# Patient Record
Sex: FEMALE | Race: WHITE | NOT HISPANIC OR LATINO | Employment: OTHER | URBAN - METROPOLITAN AREA
[De-identification: names, ages, dates, MRNs, and addresses within clinical notes are randomized per-mention and may not be internally consistent; named-entity substitution may affect disease eponyms.]

---

## 2017-02-07 ENCOUNTER — OFFICE VISIT (OUTPATIENT)
Dept: FAMILY MEDICINE | Facility: CLINIC | Age: 72
End: 2017-02-07
Payer: MEDICARE

## 2017-02-07 VITALS
BODY MASS INDEX: 44.02 KG/M2 | WEIGHT: 233.13 LBS | OXYGEN SATURATION: 96 % | HEIGHT: 61 IN | DIASTOLIC BLOOD PRESSURE: 68 MMHG | HEART RATE: 80 BPM | TEMPERATURE: 98 F | SYSTOLIC BLOOD PRESSURE: 130 MMHG

## 2017-02-07 DIAGNOSIS — E66.01 MORBID OBESITY WITH BMI OF 40.0-44.9, ADULT: ICD-10-CM

## 2017-02-07 DIAGNOSIS — E78.5 HYPERLIPIDEMIA, UNSPECIFIED HYPERLIPIDEMIA TYPE: ICD-10-CM

## 2017-02-07 DIAGNOSIS — C50.919 BREAST CANCER, STAGE 1, UNSPECIFIED LATERALITY: ICD-10-CM

## 2017-02-07 DIAGNOSIS — J32.9 SINUSITIS, UNSPECIFIED CHRONICITY, UNSPECIFIED LOCATION: Primary | ICD-10-CM

## 2017-02-07 DIAGNOSIS — I70.0 ATHEROSCLEROSIS OF AORTA: ICD-10-CM

## 2017-02-07 DIAGNOSIS — I12.9 BENIGN HYPERTENSION WITH CHRONIC KIDNEY DISEASE, STAGE III: ICD-10-CM

## 2017-02-07 DIAGNOSIS — Z79.811 USE OF AROMATASE INHIBITORS: ICD-10-CM

## 2017-02-07 DIAGNOSIS — R73.03 PREDIABETES: ICD-10-CM

## 2017-02-07 DIAGNOSIS — N18.30 BENIGN HYPERTENSION WITH CHRONIC KIDNEY DISEASE, STAGE III: ICD-10-CM

## 2017-02-07 DIAGNOSIS — N18.30 CKD (CHRONIC KIDNEY DISEASE) STAGE 3, GFR 30-59 ML/MIN: ICD-10-CM

## 2017-02-07 PROCEDURE — 99214 OFFICE O/P EST MOD 30 MIN: CPT | Mod: PBBFAC,PO | Performed by: NURSE PRACTITIONER

## 2017-02-07 PROCEDURE — 99214 OFFICE O/P EST MOD 30 MIN: CPT | Mod: S$PBB,,, | Performed by: NURSE PRACTITIONER

## 2017-02-07 PROCEDURE — 99999 PR PBB SHADOW E&M-EST. PATIENT-LVL IV: CPT | Mod: PBBFAC,,, | Performed by: NURSE PRACTITIONER

## 2017-02-07 RX ORDER — AZITHROMYCIN 250 MG/1
TABLET, FILM COATED ORAL
Qty: 6 TABLET | Refills: 0 | Status: SHIPPED | OUTPATIENT
Start: 2017-02-07 | End: 2017-03-08

## 2017-02-07 NOTE — MR AVS SNAPSHOT
[facility]  [address]  [place] [address]  Phone: [phone]  Fax: [phone]                  Francoise De Jesus   2017 11:20 AM   Office Visit    Description:  Female : 1945   Provider:  YODIT Polk   Department:  [facility]           Reason for Visit     Otalgia           Diagnoses this Visit        Comments    Sinusitis, unspecified chronicity, unspecified location    -  Primary     Atherosclerosis of aorta         Benign hypertension with chronic kidney disease, stage III         Use of aromatase inhibitors         Breast cancer, stage 1, unspecified laterality         CKD (chronic kidney disease) stage 3, GFR 30-59 ml/min         Hyperlipidemia, unspecified hyperlipidemia type         Morbid obesity with BMI of 40.0-44.9, adult         Prediabetes                To Do List           Future Appointments        Provider Department Dept Phone    3/13/2017 10:20 AM Lulu Auguste MD [facility] [phone]      Goals (5 Years of Data)     None       These Medications        Disp Refills Start End    azithromycin (ZITHROMAX Z-NATHANIEL) 250 MG tablet 6 tablet 0 2017     Take 2 pills day 1, then 1 pill day 2-5.    Pharmacy: [facility]  [place], [address] Ph #: [phone]         [facility] On Call     [facility] On Call Nurse Care Line -  Assistance  Registered nurses in the [facility] On Call Center provide clinical advisement, health education, appointment booking, and other advisory services.  Call for this free service at [phone].             Medications           Message regarding Medications     Verify the changes and/or additions to your medication regime listed below are the same as discussed with your clinician today.  If any of these changes or additions are incorrect, please notify your healthcare provider.        START taking these NEW medications        Refills     "azithromycin (ZITHROMAX Z-NATHANIEL) 250 MG tablet 0    Sig: Take 2 pills day 1, then 1 pill day 2-5.    Class: Normal           Verify that the below list of medications is an accurate representation of the medications you are currently taking.  If none reported, the list may be blank. If incorrect, please contact your healthcare provider. Carry this list with you in case of emergency.           Current Medications     acetaminophen (TYLENOL) 500 MG tablet Take 500 mg by mouth every 6 (six) hours as needed for Pain.    anastrozole (ARIMIDEX) 1 mg Tab Take 1 tablet (1 mg total) by mouth once daily.    ANTIOX#10/OM3/DHA/EPA/LUT/ZEAX (I-CAPS ORAL) Take 1 tablet by mouth once daily.    fenofibrate (TRICOR) 145 MG tablet Take 1 tablet (145 mg total) by mouth once daily. 1 Tablet Oral Every evening    fish oil-omega-3 fatty acids (FISH OIL) 300-1,000 mg capsule     hydrochlorothiazide (HYDRODIURIL) 25 MG tablet Take 1 tablet (25 mg total) by mouth once daily. 1 Tablet Oral Every morning    lisinopril (PRINIVIL,ZESTRIL) 40 MG tablet Take 1 tablet (40 mg total) by mouth once daily. 1 Tablet Oral Every morning    omeprazole (PRILOSEC) 40 MG capsule Take 1 capsule (40 mg total) by mouth once daily.    pravastatin (PRAVACHOL) 10 MG tablet Take 1 tablet (10 mg total) by mouth once daily.    azithromycin (ZITHROMAX Z-NATHANIEL) 250 MG tablet Take 2 pills day 1, then 1 pill day 2-5.           Clinical Reference Information           Your Vitals Were     BP Pulse Temp Height Weight SpO2    130/68 (BP Location: Left arm, Patient Position: Sitting, BP Method: Manual) 80 98.3 °F (36.8 °C) (Oral) 5' 1" (1.549 m) 105.7 kg (233 lb 2.2 oz) 96%    BMI                44.05 kg/m2          Blood Pressure          Most Recent Value    BP  130/68      Allergies as of 2/7/2017     Medrol [Methylprednisolone]    Penicillins      Immunizations Administered on Date of Encounter - 2/7/2017     Name Date Dose VIS Date Route    TDAP  Incomplete 0.5 mL 2/24/2015 " Intramuscular      Orders Placed During Today's Visit      Normal Orders This Visit    Tdap Vaccine (Adult)       Instructions    Mucinex DM as directed        Language Assistance Services     ATTENTION: Language assistance services are available, free of charge. Please call [phone].      ATENCIÓN: Si habla yolanda, tiene a montemayor disposición servicios gratuitos de asistencia lingüística. Llame al [phone].     MADELEINE Ý: N?u b?n nói Ti?ng Vi?t, có các d?ch v? h? tr? ngôn ng? mi?n phí dành cho b?n. G?i s? [phone].         [facility] complies with applicable Federal civil rights laws and does not discriminate on the basis of race, color, national origin, age, disability, or sex.

## 2017-02-07 NOTE — PROGRESS NOTES
Subjective:       Patient ID: Francoise De Jesus is a 71 y.o. female.    Chief Complaint: Otalgia (Left 3 days)    HPI Comments: 71-year-old female presents to the clinic today with left ear pain, sore throat and sinus congestion since Sunday.   she denies any fever, chills, coughing, wheezing, shortness of breath, abdominal pain, nausea, vomiting, or diarrhea.  She denies any headaches, dizziness, or blurred vision.  She denies any cardiac chest pain, heart palpitations, shortness of breath, or swelling to lower extremities.  She's not taking anything yet for her symptoms.     Past Medical History   Diagnosis Date    Arthritis     Atherosclerosis of aorta      noted on CXR 11/17/2014    Breast cancer 2011     stage I right breast cancer    History of peptic ulcer     Hyperlipidemia     Hypertension     Morbid obesity     Prediabetes      Past Surgical History   Procedure Laterality Date    Cholecystectomy      Breast lumpectomy Right 11/2011     right lumpectomy and SN biopsy      reports that she has never smoked. She has never used smokeless tobacco. She reports that she does not drink alcohol or use illicit drugs.  Review of Systems   Constitutional: Negative for chills and fever.   HENT: Positive for congestion, ear pain and sore throat. Negative for ear discharge, sinus pressure, sneezing and tinnitus.    Eyes: Negative for pain, discharge and itching.   Respiratory: Negative for cough, shortness of breath and wheezing.    Cardiovascular: Negative for chest pain, palpitations and leg swelling.   Gastrointestinal: Negative for abdominal pain, diarrhea, nausea and vomiting.   Musculoskeletal: Negative for gait problem, neck pain and neck stiffness.   Skin: Negative for rash.   Neurological: Negative for dizziness, light-headedness and headaches.       Objective:      Physical Exam   Constitutional: She is oriented to person, place, and time. She appears well-developed and well-nourished. No distress.   HENT:    Head: Normocephalic and atraumatic.   Right Ear: External ear normal.   Left Ear: External ear normal.   Mouth/Throat: Oropharynx is clear and moist. No oropharyngeal exudate.   Both nares red and inflamed with tenderness noted over both maxillary sinuses    Eyes: Conjunctivae and EOM are normal. Pupils are equal, round, and reactive to light. Right eye exhibits no discharge. Left eye exhibits no discharge. No scleral icterus.   Neck: Normal range of motion. Neck supple.   Cardiovascular: Normal rate, regular rhythm and normal heart sounds.  Exam reveals no gallop and no friction rub.    No murmur heard.  Pulmonary/Chest: Effort normal and breath sounds normal. No respiratory distress. She has no wheezes. She has no rales.   Abdominal: Soft. Bowel sounds are normal. There is no tenderness. There is no rebound and no guarding.   Musculoskeletal: Normal range of motion. She exhibits no edema.   Lymphadenopathy:     She has cervical adenopathy.   Neurological: She is alert and oriented to person, place, and time.   Skin: Skin is warm and dry. She is not diaphoretic.   Psychiatric: She has a normal mood and affect.       Assessment:       1. Sinusitis, unspecified chronicity, unspecified location    2. Atherosclerosis of aorta    3. Benign hypertension with chronic kidney disease, stage III    4. Use of aromatase inhibitors    5. Breast cancer, stage 1, unspecified laterality    6. CKD (chronic kidney disease) stage 3, GFR 30-59 ml/min    7. Hyperlipidemia, unspecified hyperlipidemia type    8. Morbid obesity with BMI of 40.0-44.9, adult    9. Prediabetes        Plan:         Sinusitis, unspecified chronicity, unspecified location  -     azithromycin (ZITHROMAX Z-NATHANIEL) 250 MG tablet; Take 2 pills day 1, then 1 pill day 2-5.  Dispense: 6 tablet; Refill: 0  - Mucinex DM as directed    Atherosclerosis of aorta  - Stable / Asymptomatic is on blood pressure and cholesterol lowering medications    Benign hypertension with  chronic kidney disease, stage III  - The current medical regimen is effective;  continue present plan and medications.  - avoid anti-inflammatories     Use of aromatase inhibitors  - The current medical regimen is effective;  continue present plan and medications.    Breast cancer, stage 1, unspecified laterality  - on Arimidex    CKD (chronic kidney disease) stage 3, GFR 30-59 ml/min  - avoid anti-inflammatories     Hyperlipidemia, unspecified hyperlipidemia type  - The current medical regimen is effective;  continue present plan and medications.    Morbid obesity with BMI of 40.0-44.9, adult  - The patient is asked to make an attempt to improve diet and exercise patterns to aid in medical management of this problem.    Prediabetes  - avoid excessive sugars and carbohydrates

## 2017-02-17 ENCOUNTER — TELEPHONE (OUTPATIENT)
Dept: HEMATOLOGY/ONCOLOGY | Facility: CLINIC | Age: 72
End: 2017-02-17

## 2017-02-17 NOTE — TELEPHONE ENCOUNTER
----- Message from Kenrick Brunner sent at 2/17/2017 10:34 AM CST -----  Contact: Pt      ----- Message -----     From: Prudence Herring     Sent: 2/16/2017   3:20 PM       To: Vick Chávez Staff    Pt called asking for advice about scheduling an appointment for a f/u.        Please call pt at [phone].        Thanks!

## 2017-03-08 ENCOUNTER — OFFICE VISIT (OUTPATIENT)
Dept: OPTOMETRY | Facility: CLINIC | Age: 72
End: 2017-03-08
Payer: MEDICARE

## 2017-03-08 DIAGNOSIS — H35.30 AMD (AGE RELATED MACULAR DEGENERATION): ICD-10-CM

## 2017-03-08 DIAGNOSIS — H52.4 HYPEROPIA WITH PRESBYOPIA OF BOTH EYES: ICD-10-CM

## 2017-03-08 DIAGNOSIS — Z13.5 GLAUCOMA SCREENING: ICD-10-CM

## 2017-03-08 DIAGNOSIS — H52.03 HYPEROPIA WITH PRESBYOPIA OF BOTH EYES: ICD-10-CM

## 2017-03-08 DIAGNOSIS — H25.13 NUCLEAR SCLEROSIS, BILATERAL: Primary | ICD-10-CM

## 2017-03-08 PROCEDURE — 92015 DETERMINE REFRACTIVE STATE: CPT | Mod: ,,, | Performed by: OPTOMETRIST

## 2017-03-08 PROCEDURE — 99212 OFFICE O/P EST SF 10 MIN: CPT | Mod: PBBFAC,PO | Performed by: OPTOMETRIST

## 2017-03-08 PROCEDURE — 99999 PR PBB SHADOW E&M-EST. PATIENT-LVL II: CPT | Mod: PBBFAC,,, | Performed by: OPTOMETRIST

## 2017-03-08 PROCEDURE — 92014 COMPRE OPH EXAM EST PT 1/>: CPT | Mod: S$PBB,,, | Performed by: OPTOMETRIST

## 2017-03-08 NOTE — PATIENT INSTRUCTIONS
Cataract Surgery FAQs  Frequently Asked Questions    My doctor told me I have a cataract     What do I do next?   Relax. Cataracts are a normal part of aging, and cataract surgery is among the safest and most common procedures performed today.  Most patients who have had cataract surgery report significant improvement in their quality of life because of their improved vision, with a speedy recovery and minimal discomfort or inconvenience.    Your optometrist will recommend a cataract surgeon who will examine your eyes, evaluate the need for surgery, and discuss the details with you and your family.     What exactly is a cataract?   A cataract is simply a darkening or clouding of the eyes internal lens, which blurs your vision.  It is not a film which grows over the eye, but rather a degenerative process which causes the eyes normally clear lens to become cloudy or hazy.     Because this degenerative process occurs slowly over many years, we generally wait until the cataract begins to significantly impact your vision, before recommend surgery.                     How is cataract surgery performed?  Cataract surgery involves removal of the dark or cloudy lens from the eye, and implantation of a new, clear lens implant or IOL.  Cataract surgery is a lens replacement surgery.          Modern cataract surgery is performed as a same-day surgery and typically takes about 15 minutes to complete.  It is performed while you are awake, but you will be medicated so that you are relaxed and comfortable. Of course, the eye is anesthetized so that you dont feel any discomfort, and many people only vaguely remember the actual procedure, recalling only lights and the sensation of moisture on the eye.     Although is takes about a week to fully heal, most people are able to see better and resume their normal activities the very next day!  You will need to use eye drops for about one month after your surgery.     Will I  need glasses after cataract surgery?   The purpose of cataract surgery is to improve the overall quality of your vision, but it does not necessarily eliminate the need for glasses. Although some people dont need to wear glasses after standard cataract surgery, most people will still need to wear glasses for certain tasks.  If you are interested in minimizing or even eliminating the need for glasses, you should ask your surgeon about Refractive Cataract Surgery.    What is Refractive Cataract Surgery?   Refractive Cataract Surgery refers to the use of additional techniques performed either at the time of your cataract surgery or soon afterwards, designed to minimize and often eliminate your need for glasses.  Technologies such as LASIK, Astigmatism Correcting Incisions, Multifocal, Accommodating, or Toric lens implants can all be used, depending on the particular needs of each patient. Only your surgeon can determine if you are a candidate and which techniques are appropriate for your goals and your eye.                         LASIK                Multifocal IOL         Will my insurance cover these additional procedures?   Although your insurance will fully cover your cataract surgery, any other additional procedures -designed solely to eliminate the need for glasses- are considered elective (not medically necessary), and therefore not covered by your insurance.  Rest assured that your vision will be better after cataract surgery, in either case.  You simply need to decide whether minimizing your dependence on glasses is worth the additional investment in your eyes.  (Costs typically range between $1,000 to $2,000 per eye, depending on your needs).    View a 1hr video discussing cataract surgery with live patient questions and answers on the [employer] Web Site (Keywords: [facility] Cataract):    [email or web address]

## 2017-03-08 NOTE — MR AVS SNAPSHOT
[facility]  [address]  [place] [address]  Phone: [phone]  Fax: [phone]                  Francoise De Jesus   3/8/2017 9:30 AM   Office Visit    Description:  Female : 1945   Provider:  Wang Agarwal OD   Department:  [facility]           Reason for Visit     Annual Exam           Diagnoses this Visit        Comments    Nuclear sclerosis, bilateral    -  Primary     AMD (age related macular degeneration)         Glaucoma screening         Hyperopia with presbyopia of both eyes                To Do List           Future Appointments        Provider Department Dept Phone    3/13/2017 10:20 AM [facility]e [facility] [phone]    3/21/2017 11:00 AM Kyler Hickman MD [place] - Hematology Oncology [phone]      Goals (5 Years of Data)     None      [facility] On Call     [facility] On Call Nurse Care Line - / Assistance  Registered nurses in the [facility] On Call Center provide clinical advisement, health education, appointment booking, and other advisory services.  Call for this free service at [phone].             Medications           Message regarding Medications     Verify the changes and/or additions to your medication regime listed below are the same as discussed with your clinician today.  If any of these changes or additions are incorrect, please notify your healthcare provider.        STOP taking these medications     azithromycin (ZITHROMAX Z-NATHANIEL) 250 MG tablet Take 2 pills day 1, then 1 pill day 2-5.           Verify that the below list of medications is an accurate representation of the medications you are currently taking.  If none reported, the list may be blank. If incorrect, please contact your healthcare provider. Carry this list with you in case of emergency.           Current Medications     acetaminophen (TYLENOL) 500 MG tablet Take 500 mg by mouth every 6 (six) hours as needed for Pain.    anastrozole (ARIMIDEX) 1 mg Tab  Take 1 tablet (1 mg total) by mouth once daily.    ANTIOX#10/OM3/DHA/EPA/LUT/ZEAX (I-CAPS ORAL) Take 1 tablet by mouth once daily.    fenofibrate (TRICOR) 145 MG tablet Take 1 tablet (145 mg total) by mouth once daily. 1 Tablet Oral Every evening    fish oil-omega-3 fatty acids (FISH OIL) 300-1,000 mg capsule     hydrochlorothiazide (HYDRODIURIL) 25 MG tablet Take 1 tablet (25 mg total) by mouth once daily. 1 Tablet Oral Every morning    lisinopril (PRINIVIL,ZESTRIL) 40 MG tablet Take 1 tablet (40 mg total) by mouth once daily. 1 Tablet Oral Every morning    omeprazole (PRILOSEC) 40 MG capsule Take 1 capsule (40 mg total) by mouth once daily.    pravastatin (PRAVACHOL) 10 MG tablet Take 1 tablet (10 mg total) by mouth once daily.           Clinical Reference Information           Allergies as of 3/8/2017     Medrol [Methylprednisolone]    Penicillins      Immunizations Administered on Date of Encounter - 3/8/2017     None      Orders Placed During Today's Visit      Normal Orders This Visit    Ambulatory Referral to Ophthalmology       Instructions        Cataract Surgery FAQs  Frequently Asked Questions    My doctor told me I have a cataract     What do I do next?   Relax. Cataracts are a normal part of aging, and cataract surgery is among the safest and most common procedures performed today.  Most patients who have had cataract surgery report significant improvement in their quality of life because of their improved vision, with a speedy recovery and minimal discomfort or inconvenience.    Your optometrist will recommend a cataract surgeon who will examine your eyes, evaluate the need for surgery, and discuss the details with you and your family.     What exactly is a cataract?   A cataract is simply a darkening or clouding of the eyes internal lens, which blurs your vision.  It is not a film which grows over the eye, but rather a degenerative process which causes the eyes normally clear lens to become cloudy  or hazy.     Because this degenerative process occurs slowly over many years, we generally wait until the cataract begins to significantly impact your vision, before recommend surgery.                     How is cataract surgery performed?  Cataract surgery involves removal of the dark or cloudy lens from the eye, and implantation of a new, clear lens implant or IOL.  Cataract surgery is a lens replacement surgery.          Modern cataract surgery is performed as a same-day surgery and typically takes about 15 minutes to complete.  It is performed while you are awake, but you will be medicated so that you are relaxed and comfortable. Of course, the eye is anesthetized so that you dont feel any discomfort, and many people only vaguely remember the actual procedure, recalling only lights and the sensation of moisture on the eye.     Although is takes about a week to fully heal, most people are able to see better and resume their normal activities the very next day!  You will need to use eye drops for about one month after your surgery.     Will I need glasses after cataract surgery?   The purpose of cataract surgery is to improve the overall quality of your vision, but it does not necessarily eliminate the need for glasses. Although some people dont need to wear glasses after standard cataract surgery, most people will still need to wear glasses for certain tasks.  If you are interested in minimizing or even eliminating the need for glasses, you should ask your surgeon about Refractive Cataract Surgery.    What is Refractive Cataract Surgery?   Refractive Cataract Surgery refers to the use of additional techniques performed either at the time of your cataract surgery or soon afterwards, designed to minimize and often eliminate your need for glasses.  Technologies such as LASIK, Astigmatism Correcting Incisions, Multifocal, Accommodating, or Toric lens implants can all be used, depending on the particular needs  of each patient. Only your surgeon can determine if you are a candidate and which techniques are appropriate for your goals and your eye.                         LASIK                Multifocal IOL         Will my insurance cover these additional procedures?   Although your insurance will fully cover your cataract surgery, any other additional procedures -designed solely to eliminate the need for glasses- are considered elective (not medically necessary), and therefore not covered by your insurance.  Rest assured that your vision will be better after cataract surgery, in either case.  You simply need to decide whether minimizing your dependence on glasses is worth the additional investment in your eyes.  (Costs typically range between $1,000 to $2,000 per eye, depending on your needs).    View a 1hr video discussing cataract surgery with live patient questions and answers on the [facility] Web Site (Keywords: [facility] Cataract):    [email or web address]           Language Assistance Services     ATTENTION: Language assistance services are available, free of charge. Please call [phone].      ATENCIÓN: Si habla yolanda, tiene a montemaoyr disposición servicios gratuitos de asistencia lingüística. Llame al [phone].     CHÚ Ý: N?u b?n nói Ti?ng Vi?t, có các d?ch v? h? tr? ngôn ng? mi?n phí dành cho b?n. G?i s? [phone].         [facility] complies with applicable Federal civil rights laws and does not discriminate on the basis of race, color, national origin, age, disability, or sex.

## 2017-03-08 NOTE — PROGRESS NOTES
HPI     DLS: 6/11/14  With Dr. Hicks    Pt here for routine eye;  Pt states her glasses broke and would like new glasses. Pt denies any   floaters, flashes or diplopia. Pt states she still takes her eye vitamins.    Meds: No GTTS, ICAPs QD PO (per Kurt)       Last edited by Wang Agarwal, OD on 3/8/2017 10:26 AM.         Assessment /Plan     For exam results, see Encounter Report.    Nuclear sclerosis, bilateral  -     Ambulatory Referral to Ophthalmology  -Referral to Dr. Okeefe for cataract extraction evaluation.  Reviewed implant options and gave patient a copy of cataract FAQ.    AMD (age related macular degeneration)  -Increase ICAPs to BID    Glaucoma screening  -Monitor with annual eye exam and IOP check    Hyperopia with presbyopia of both eyes  Eyeglass Final Rx     Eyeglass Final Rx      Sphere Cylinder Axis Add   Right +0.25 +0.50 040 +2.50   Left +0.25 Sphere  +2.50               -Hold sRx secondary to cataracts         RTC as directed by OMD

## 2017-03-13 ENCOUNTER — LAB VISIT (OUTPATIENT)
Dept: LAB | Facility: HOSPITAL | Age: 72
End: 2017-03-13
Attending: INTERNAL MEDICINE
Payer: MEDICARE

## 2017-03-13 ENCOUNTER — OFFICE VISIT (OUTPATIENT)
Dept: FAMILY MEDICINE | Facility: CLINIC | Age: 72
End: 2017-03-13
Payer: MEDICARE

## 2017-03-13 VITALS
HEART RATE: 82 BPM | BODY MASS INDEX: 42.02 KG/M2 | TEMPERATURE: 98 F | HEIGHT: 63 IN | OXYGEN SATURATION: 96 % | WEIGHT: 237.13 LBS | SYSTOLIC BLOOD PRESSURE: 128 MMHG | DIASTOLIC BLOOD PRESSURE: 76 MMHG

## 2017-03-13 DIAGNOSIS — C50.919 BREAST CANCER, STAGE 1, UNSPECIFIED LATERALITY: ICD-10-CM

## 2017-03-13 DIAGNOSIS — Z12.11 COLON CANCER SCREENING: ICD-10-CM

## 2017-03-13 DIAGNOSIS — I70.0 ATHEROSCLEROSIS OF AORTA: ICD-10-CM

## 2017-03-13 DIAGNOSIS — I12.9 BENIGN HYPERTENSION WITH CHRONIC KIDNEY DISEASE, STAGE III: ICD-10-CM

## 2017-03-13 DIAGNOSIS — R73.9 ELEVATED BLOOD SUGAR: ICD-10-CM

## 2017-03-13 DIAGNOSIS — I12.9 BENIGN HYPERTENSION WITH CHRONIC KIDNEY DISEASE, STAGE III: Primary | ICD-10-CM

## 2017-03-13 DIAGNOSIS — R73.03 PREDIABETES: ICD-10-CM

## 2017-03-13 DIAGNOSIS — R53.83 FATIGUE, UNSPECIFIED TYPE: ICD-10-CM

## 2017-03-13 DIAGNOSIS — R14.2 BELCHING: ICD-10-CM

## 2017-03-13 DIAGNOSIS — E78.5 HYPERLIPIDEMIA, UNSPECIFIED HYPERLIPIDEMIA TYPE: ICD-10-CM

## 2017-03-13 DIAGNOSIS — E66.01 MORBID OBESITY WITH BMI OF 40.0-44.9, ADULT: ICD-10-CM

## 2017-03-13 DIAGNOSIS — N18.30 BENIGN HYPERTENSION WITH CHRONIC KIDNEY DISEASE, STAGE III: Primary | ICD-10-CM

## 2017-03-13 DIAGNOSIS — N18.30 BENIGN HYPERTENSION WITH CHRONIC KIDNEY DISEASE, STAGE III: ICD-10-CM

## 2017-03-13 PROBLEM — H35.30 MACULAR DEGENERATION: Status: ACTIVE | Noted: 2017-03-13

## 2017-03-13 LAB
ALBUMIN SERPL BCP-MCNC: 3.7 G/DL
ALP SERPL-CCNC: 71 U/L
ALT SERPL W/O P-5'-P-CCNC: 11 U/L
ANION GAP SERPL CALC-SCNC: 9 MMOL/L
AST SERPL-CCNC: 18 U/L
BASOPHILS # BLD AUTO: 0.02 K/UL
BASOPHILS NFR BLD: 0.2 %
BILIRUB SERPL-MCNC: 0.5 MG/DL
BUN SERPL-MCNC: 38 MG/DL
CALCIUM SERPL-MCNC: 9.8 MG/DL
CHLORIDE SERPL-SCNC: 105 MMOL/L
CHOLEST/HDLC SERPL: 3 {RATIO}
CO2 SERPL-SCNC: 26 MMOL/L
CREAT SERPL-MCNC: 1 MG/DL
DIFFERENTIAL METHOD: NORMAL
EOSINOPHIL # BLD AUTO: 0.2 K/UL
EOSINOPHIL NFR BLD: 1.9 %
ERYTHROCYTE [DISTWIDTH] IN BLOOD BY AUTOMATED COUNT: 13.9 %
EST. GFR  (AFRICAN AMERICAN): >60 ML/MIN/1.73 M^2
EST. GFR  (NON AFRICAN AMERICAN): 56.4 ML/MIN/1.73 M^2
GLUCOSE SERPL-MCNC: 110 MG/DL
HCT VFR BLD AUTO: 37.5 %
HDL/CHOLESTEROL RATIO: 32.9 %
HDLC SERPL-MCNC: 164 MG/DL
HDLC SERPL-MCNC: 54 MG/DL
HGB BLD-MCNC: 12.1 G/DL
LDLC SERPL CALC-MCNC: 80.2 MG/DL
LYMPHOCYTES # BLD AUTO: 2.5 K/UL
LYMPHOCYTES NFR BLD: 30.6 %
MCH RBC QN AUTO: 29.6 PG
MCHC RBC AUTO-ENTMCNC: 32.3 %
MCV RBC AUTO: 92 FL
MONOCYTES # BLD AUTO: 0.5 K/UL
MONOCYTES NFR BLD: 6.4 %
NEUTROPHILS # BLD AUTO: 5 K/UL
NEUTROPHILS NFR BLD: 60.5 %
NONHDLC SERPL-MCNC: 110 MG/DL
PLATELET # BLD AUTO: 252 K/UL
PMV BLD AUTO: 10.7 FL
POTASSIUM SERPL-SCNC: 4.5 MMOL/L
PROT SERPL-MCNC: 7.7 G/DL
PTH-INTACT SERPL-MCNC: 31 PG/ML
RBC # BLD AUTO: 4.09 M/UL
SODIUM SERPL-SCNC: 140 MMOL/L
TRIGL SERPL-MCNC: 149 MG/DL
WBC # BLD AUTO: 8.3 K/UL

## 2017-03-13 PROCEDURE — 85025 COMPLETE CBC W/AUTO DIFF WBC: CPT

## 2017-03-13 PROCEDURE — 80053 COMPREHEN METABOLIC PANEL: CPT

## 2017-03-13 PROCEDURE — 36415 COLL VENOUS BLD VENIPUNCTURE: CPT | Mod: PO

## 2017-03-13 PROCEDURE — 83036 HEMOGLOBIN GLYCOSYLATED A1C: CPT

## 2017-03-13 PROCEDURE — 99999 PR PBB SHADOW E&M-EST. PATIENT-LVL III: CPT | Mod: PBBFAC,,, | Performed by: INTERNAL MEDICINE

## 2017-03-13 PROCEDURE — 99214 OFFICE O/P EST MOD 30 MIN: CPT | Mod: S$PBB,,, | Performed by: INTERNAL MEDICINE

## 2017-03-13 PROCEDURE — 80061 LIPID PANEL: CPT

## 2017-03-13 PROCEDURE — 83970 ASSAY OF PARATHORMONE: CPT

## 2017-03-13 RX ORDER — LISINOPRIL 40 MG/1
40 TABLET ORAL DAILY
Qty: 90 TABLET | Refills: 1 | Status: SHIPPED | OUTPATIENT
Start: 2017-03-13 | End: 2017-11-21 | Stop reason: SDUPTHER

## 2017-03-13 RX ORDER — PRAVASTATIN SODIUM 10 MG/1
10 TABLET ORAL DAILY
Qty: 90 TABLET | Refills: 1 | Status: SHIPPED | OUTPATIENT
Start: 2017-03-13 | End: 2017-11-21 | Stop reason: SDUPTHER

## 2017-03-13 RX ORDER — ASPIRIN 81 MG/1
81 TABLET ORAL DAILY
Refills: 0 | COMMUNITY
Start: 2017-03-13 | End: 2024-03-22

## 2017-03-13 RX ORDER — HYDROCHLOROTHIAZIDE 25 MG/1
25 TABLET ORAL DAILY
Qty: 90 TABLET | Refills: 1 | Status: SHIPPED | OUTPATIENT
Start: 2017-03-13 | End: 2017-11-21 | Stop reason: SDUPTHER

## 2017-03-13 RX ORDER — OMEPRAZOLE 40 MG/1
40 CAPSULE, DELAYED RELEASE ORAL DAILY
Qty: 90 CAPSULE | Refills: 1 | Status: SHIPPED | OUTPATIENT
Start: 2017-03-13 | End: 2020-01-03 | Stop reason: SDUPTHER

## 2017-03-13 NOTE — MR AVS SNAPSHOT
[facility]  [address]  [place] [address]  Phone: [phone]  Fax: [phone]                  Francoise De Jesus   3/13/2017 10:20 AM   Office Visit    Description:  Female : 1945   Provider:  Lulu Auguste MD   Department:  [facility] - [facility] Medicine           Reason for Visit     Hyperlipidemia     Hypertension           Diagnoses this Visit        Comments    Benign hypertension with chronic kidney disease, stage III    -  Primary     Hyperlipidemia, unspecified hyperlipidemia type         Prediabetes         Breast cancer, stage 1, unspecified laterality         Belching         Atherosclerosis of aorta         Morbid obesity with BMI of 40.0-44.9, adult         Colon cancer screening         Fatigue, unspecified type         Elevated blood sugar                To Do List           Future Appointments        Provider Department Dept Phone    3/21/2017 11:00 AM Kyler Hickman MD [place] - Hematology Oncology [phone]    2017 10:00 AM Gt Okeefe MD [facility] - Ophthalmology [phone]      Goals (5 Years of Data)     None      Follow-Up and Disposition     Return in about 6 months (around 2017), or if symptoms worsen or fail to improve, for follow up chronic medical conditions..       These Medications        Disp Refills Start End    pravastatin (PRAVACHOL) 10 MG tablet 90 tablet 1 3/13/2017 3/13/2018    Take 1 tablet (10 mg total) by mouth once daily. - Oral    [facility]: [phone]       omeprazole (PRILOSEC) 40 MG capsule 90 capsule 1 3/13/2017 3/13/2018    Take 1 capsule (40 mg total) by mouth once daily. - Oral    [facility] [address] [facility]: [phone]       lisinopril (PRINIVIL,ZESTRIL) 40 MG tablet 90 tablet 1 3/13/2017     Take 1 tablet (40 mg total) by mouth once daily. 1 Tablet Oral Every morning - Oral    Ph[facility]:  [facility] [place] [place] [facility] [address] [facility]: [phone]       hydrochlorothiazide (HYDRODIURIL) 25 MG tablet 90 tablet 1 3/13/2017     Take 1 tablet (25 mg total) by mouth once daily. 1 Tablet Oral Every morning - Oral    Ph[facility]: [facility] [place] [place] [facility] [address] [facility]: [phone]         PURCHASE these Medications (No prescription required)        Start End    aspirin (ECOTRIN) 81 MG EC tablet 3/13/2017 3/13/2018    Sig - Route: Take 1 tablet (81 mg total) by mouth once daily. - Oral    Class: OTC      [facility] On Call     [facility] On Call Nurse Care Line - 24/7 Assistance  Registered nurses in the [facility] On Call Center provide clinical advisement, health education, appointment booking, and other advisory services.  Call for this free service at [phone].             Medications           Message regarding Medications     Verify the changes and/or additions to your medication regime listed below are the same as discussed with your clinician today.  If any of these changes or additions are incorrect, please notify your healthcare provider.        START taking these NEW medications        Refills    aspirin (ECOTRIN) 81 MG EC tablet 0    Sig: Take 1 tablet (81 mg total) by mouth once daily.    Class: OTC    Route: Oral           Verify that the below list of medications is an accurate representation of the medications you are currently taking.  If none reported, the list may be blank. If incorrect, please contact your healthcare provider. Carry this list with you in case of emergency.           Current Medications     acetaminophen (TYLENOL) 500 MG tablet Take 500 mg by mouth every 6 (six) hours as needed for Pain.    anastrozole (ARIMIDEX) 1 mg Tab Take 1 tablet (1 mg total) by mouth once daily.    ANTIOX#10/OM3/DHA/EPA/LUT/ZEAX (I-CAPS ORAL) Take 1 tablet by mouth once daily.    fenofibrate (TRICOR) 145 MG tablet Take 1 tablet (145 mg total) by mouth once daily.  "1 Tablet Oral Every evening    fish oil-omega-3 fatty acids (FISH OIL) 300-1,000 mg capsule     hydrochlorothiazide (HYDRODIURIL) 25 MG tablet Take 1 tablet (25 mg total) by mouth once daily. 1 Tablet Oral Every morning    lisinopril (PRINIVIL,ZESTRIL) 40 MG tablet Take 1 tablet (40 mg total) by mouth once daily. 1 Tablet Oral Every morning    omeprazole (PRILOSEC) 40 MG capsule Take 1 capsule (40 mg total) by mouth once daily.    pravastatin (PRAVACHOL) 10 MG tablet Take 1 tablet (10 mg total) by mouth once daily.    aspirin (ECOTRIN) 81 MG EC tablet Take 1 tablet (81 mg total) by mouth once daily.           Clinical Reference Information           Your Vitals Were     BP Pulse Temp Height Weight SpO2    128/76 82 97.5 °F (36.4 °C) (Oral) 5' 3" (1.6 m) 107.6 kg (237 lb 1.7 oz) 96%    BMI                42 kg/m2          Blood Pressure          Most Recent Value    BP  128/76      Allergies as of 3/13/2017     Medrol [Methylprednisolone]    Penicillins      Immunizations Administered on Date of Encounter - 3/13/2017     None      Orders Placed During Today's Visit     Future Labs/Procedures Expected by Expires    CBC auto differential  3/13/2017 3/13/2018    Comprehensive metabolic panel  3/13/2017 3/13/2018    Hemoglobin A1c  3/13/2017 3/13/2018    Lipid panel  3/13/2017 3/13/2018    Occult blood x 1, stool  3/13/2017 3/13/2018    Occult blood x 1, stool  3/13/2017 3/13/2018    Occult blood x 1, stool  3/13/2017 3/13/2018    PTH, intact  3/13/2017 3/13/2018      Language Assistance Services     ATTENTION: Language assistance services are available, free of charge. Please call [phone].      ATENCIÓN: Si habla yolanda, tiene a montemayor disposición servicios gratuitos de asistencia lingüística. Llame al [phone].     CHÚ Ý: N?u b?n nói Ti?ng Vi?t, có các d?ch v? h? tr? ngôn ng? mi?n phí dành cho b?n. G?i s? [phone].         [facility] complies with applicable Federal civil rights laws and " does not discriminate on the basis of race, color, national origin, age, disability, or sex.

## 2017-03-13 NOTE — PROGRESS NOTES
HISTORY OF PRESENT ILLNESS:  Francoise De Jesus is a 71 y.o. female who presents to the clinic today for Hyperlipidemia and Hypertension  .  The patient presents to clinic today for follow-up of her hypertension complicated by chronic kidney disease stage III, hyperlipidemia, and prediabetes.  She does not check her blood pressures or blood sugars at home.  She feels good.  She has fair dietary habits.  She does not exercise regularly.  She is followed by oncology for her breast cancer.  She is up-to-date on surveillance.  She takes omeprazole for belching with good relief of her symptoms. The patient denies any problems with cardiac chest pain, dizziness, palpitations, orthopnea, or lower extremity edema.  The patient reports compliance with current medication- patient denies missing any  medication doses.      PAST MEDICAL HISTORY:  Past Medical History:   Diagnosis Date    Arthritis     Atherosclerosis of aorta     noted on CXR 11/17/2014    Breast cancer 2011    stage I right breast cancer    History of peptic ulcer     Hyperlipidemia     Hypertension     Macular degeneration 3/13/2017    Morbid obesity     Prediabetes        PAST SURGICAL HISTORY:  Past Surgical History:   Procedure Laterality Date    BREAST LUMPECTOMY Right 11/2011    right lumpectomy and SN biopsy    CHOLECYSTECTOMY         SOCIAL HISTORY:  Social History     Social History    Marital status:      Spouse name: N/A    Number of children: 2    Years of education: N/A     Occupational History          Social History Main Topics    Smoking status: Never Smoker    Smokeless tobacco: Never Used    Alcohol use No    Drug use: No    Sexual activity: Yes     Partners: Male     Other Topics Concern    Not on file     Social History Narrative       FAMILY HISTORY:  Family History   Problem Relation Age of Onset    Stomach cancer Mother     Lung cancer Father     Hypertension Father     Breast cancer Sister 58     Cataracts Sister     Macular degeneration Sister     Macular degeneration Brother      wet    Cataracts Sister     Macular degeneration Sister     Breast cancer Sister 72    Macular degeneration Sister     Macular degeneration Sister     Macular degeneration Brother      dry    Hypertension Brother     Diabetes Brother     Hypertension Brother     Prostate cancer Brother     Hypertension Brother     Hyperlipidemia Brother     No Known Problems Maternal Grandmother     No Known Problems Maternal Grandfather     No Known Problems Paternal Grandmother     No Known Problems Paternal Grandfather     Ovarian cancer Neg Hx     Amblyopia Neg Hx     Blindness Neg Hx     Glaucoma Neg Hx     Retinal detachment Neg Hx     Strabismus Neg Hx     Stroke Neg Hx     Thyroid disease Neg Hx        ALLERGIES AND MEDICATIONS: updated and reviewed.  Review of patient's allergies indicates:   Allergen Reactions    Medrol [methylprednisolone] Other (See Comments)     Depression/tearful    Penicillins      Other reaction(s): Rash     Medication List with Changes/Refills   New Medications    ASPIRIN (ECOTRIN) 81 MG EC TABLET    Take 1 tablet (81 mg total) by mouth once daily.   Current Medications    ACETAMINOPHEN (TYLENOL) 500 MG TABLET    Take 500 mg by mouth every 6 (six) hours as needed for Pain.    ANASTROZOLE (ARIMIDEX) 1 MG TAB    Take 1 tablet (1 mg total) by mouth once daily.    ANTIOX#10/OM3/DHA/EPA/LUT/ZEAX (I-CAPS ORAL)    Take 1 tablet by mouth once daily.    FENOFIBRATE (TRICOR) 145 MG TABLET    Take 1 tablet (145 mg total) by mouth once daily. 1 Tablet Oral Every evening    FISH OIL-OMEGA-3 FATTY ACIDS (FISH OIL) 300-1,000 MG CAPSULE       Changed and/or Refilled Medications    Modified Medication Previous Medication    HYDROCHLOROTHIAZIDE (HYDRODIURIL) 25 MG TABLET hydrochlorothiazide (HYDRODIURIL) 25 MG tablet       Take 1 tablet (25 mg total) by mouth once daily. 1 Tablet Oral Every morning     "Take 1 tablet (25 mg total) by mouth once daily. 1 Tablet Oral Every morning    LISINOPRIL (PRINIVIL,ZESTRIL) 40 MG TABLET lisinopril (PRINIVIL,ZESTRIL) 40 MG tablet       Take 1 tablet (40 mg total) by mouth once daily. 1 Tablet Oral Every morning    Take 1 tablet (40 mg total) by mouth once daily. 1 Tablet Oral Every morning    OMEPRAZOLE (PRILOSEC) 40 MG CAPSULE omeprazole (PRILOSEC) 40 MG capsule       Take 1 capsule (40 mg total) by mouth once daily.    Take 1 capsule (40 mg total) by mouth once daily.    PRAVASTATIN (PRAVACHOL) 10 MG TABLET pravastatin (PRAVACHOL) 10 MG tablet       Take 1 tablet (10 mg total) by mouth once daily.    Take 1 tablet (10 mg total) by mouth once daily.            REVIEW OF SYSTEMS:  General ROS: negative for - chills, fever or malaise  Psychological ROS: negative for - depression, memory difficulties, obsessive thoughts or suicidal ideation  Ophthalmic ROS: negative for - blurry vision or eye pain  ENT ROS: negative for - epistaxis, headaches, nasal congestion, oral lesions or sore throat  Allergy and Immunology ROS: negative for - hives  Hematological and Lymphatic ROS: negative for - swollen lymph nodes  Endocrine ROS: negative for - polydipsia/polyuria or temperature intolerance  Respiratory ROS: no cough, shortness of breath, or wheezing  Cardiovascular ROS: no chest pain or dyspnea on exertion  Gastrointestinal ROS: no abdominal pain, change in bowel habits, or black or bloody stools  Dermatological ROS: negative for mole changes and rash  Musculoskeletal ROS: negative for - gait disturbance, joint swelling or muscle pain  Neurological ROS: no TIA or stroke symptoms  Genito-Urinary ROS: no dysuria, trouble voiding, or hematuria      PHYSICAL EXAM:   Vitals:    03/13/17 1038   BP: 128/76   Pulse: 82   Temp: 97.5 °F (36.4 °C)     Weight: 107.6 kg (237 lb 1.7 oz)   Height: 5' 3" (160 cm)   Body mass index is 42 kg/(m^2).     General appearance - alert, well appearing, and in no " distress and morbidly obese  Mental status - alert, oriented to person, place, and time, normal mood, behavior, speech, dress, motor activity, and thought processes  Eyes - pupils equal and reactive, extraocular eye movements intact, sclera anicteric  Mouth - mucous membranes moist, pharynx normal without lesions  Neck - supple, no significant adenopathy, carotids upstroke normal bilaterally, no bruits, thyroid exam: thyroid is normal in size without nodules or tenderness  Lymphatics - no palpable lymphadenopathy  Chest - clear to auscultation, no wheezes, rales or rhonchi, symmetric air entry  Heart - normal rate and regular rhythm, no gallops noted  Neurological - alert, oriented, normal speech, no focal findings or movement disorder noted, cranial nerves II through XII intact  Musculoskeletal - no muscular tenderness noted, Mild-Moderate osteoarthritic changes noted to both knee joints. No joint effusions noted.   Extremities - no pedal edema noted, varicose veins noted  Skin - normal coloration and turgor, no rashes, no suspicious skin lesions noted      ASSESSMENT AND PLAN:  1. Benign hypertension with chronic kidney disease, stage III  Discussed sodium restriction, maintaining ideal body weight and regular exercise program as physiologic means to achieve blood pressure control. The patient will strive towards this. The current medical regimen is effective;  continue present plan and medications. Recommended patient to check home readings to monitor and see me for followup as scheduled or sooner as needed. Patient was educated that both decongestant and anti-inflammatory medication may raise blood pressure.   - lisinopril (PRINIVIL,ZESTRIL) 40 MG tablet; Take 1 tablet (40 mg total) by mouth once daily. 1 Tablet Oral Every morning  Dispense: 90 tablet; Refill: 1  - hydrochlorothiazide (HYDRODIURIL) 25 MG tablet; Take 1 tablet (25 mg total) by mouth once daily. 1 Tablet Oral Every morning  Dispense: 90 tablet;  Refill: 1  - CBC auto differential; Future  - PTH, intact; Future    2. Hyperlipidemia, unspecified hyperlipidemia type  We discussed low fat diet and regular exercise.The current medical regimen is effective;  continue present plan and medications.    - pravastatin (PRAVACHOL) 10 MG tablet; Take 1 tablet (10 mg total) by mouth once daily.  Dispense: 90 tablet; Refill: 1  - Comprehensive metabolic panel; Future  - Lipid panel; Future    3. Prediabetes  Stable. We discussed low sugar/low carbohydrate diet and regular exercise to prevent progression. No need for prescription medication at this time.     4. Breast cancer, stage 1, unspecified laterality  The current medical regimen is effective;  continue present plan and medications. Followed by oncology.    5. Belching  The current medical regimen is effective;  continue present plan and medications.   - omeprazole (PRILOSEC) 40 MG capsule; Take 1 capsule (40 mg total) by mouth once daily.  Dispense: 90 capsule; Refill: 1    6. Atherosclerosis of aorta  Patient with Atherosclerosis of the Aorta.  Stable/asymptomatic. Currently stable on lipid lowering medication and b/p monitoring. I recommended she start daily low dose aspirin.  - aspirin (ECOTRIN) 81 MG EC tablet; Take 1 tablet (81 mg total) by mouth once daily.; Refill: 0    7. Morbid obesity with BMI of 40.0-44.9, adult  The patient is asked to make an attempt to improve diet and exercise patterns to aid in medical management of this problem.     8. Colon cancer screening/9. Fatigue, unspecified type    - Occult blood x 1, stool; Future  - Occult blood x 1, stool; Future  - Occult blood x 1, stool; Future    10. Elevated blood sugar    - Hemoglobin A1c; Future          Return in about 6 months (around 9/13/2017), or if symptoms worsen or fail to improve, for follow up chronic medical conditions.. or sooner as needed.

## 2017-03-14 LAB
ESTIMATED AVG GLUCOSE: 128 MG/DL
HBA1C MFR BLD HPLC: 6.1 %

## 2017-03-21 ENCOUNTER — OFFICE VISIT (OUTPATIENT)
Dept: HEMATOLOGY/ONCOLOGY | Facility: CLINIC | Age: 72
End: 2017-03-21
Payer: MEDICARE

## 2017-03-21 VITALS
SYSTOLIC BLOOD PRESSURE: 175 MMHG | WEIGHT: 235.88 LBS | OXYGEN SATURATION: 100 % | DIASTOLIC BLOOD PRESSURE: 74 MMHG | TEMPERATURE: 98 F | BODY MASS INDEX: 41.79 KG/M2 | RESPIRATION RATE: 14 BRPM | HEART RATE: 100 BPM

## 2017-03-21 DIAGNOSIS — C50.919 BREAST CANCER, STAGE 1, UNSPECIFIED LATERALITY: Primary | ICD-10-CM

## 2017-03-21 DIAGNOSIS — E66.01 MORBID OBESITY WITH BMI OF 40.0-44.9, ADULT: ICD-10-CM

## 2017-03-21 DIAGNOSIS — Z79.811 USE OF AROMATASE INHIBITORS: ICD-10-CM

## 2017-03-21 PROCEDURE — 99213 OFFICE O/P EST LOW 20 MIN: CPT | Mod: S$PBB,,, | Performed by: INTERNAL MEDICINE

## 2017-03-21 PROCEDURE — 99999 PR PBB SHADOW E&M-EST. PATIENT-LVL III: CPT | Mod: PBBFAC,,, | Performed by: INTERNAL MEDICINE

## 2017-03-21 PROCEDURE — 99213 OFFICE O/P EST LOW 20 MIN: CPT | Mod: PBBFAC | Performed by: INTERNAL MEDICINE

## 2017-03-21 NOTE — PROGRESS NOTES
Subjective:       Patient ID: Francoise De Jesus is a 71 y.o. female.    Chief Complaint: No chief complaint on file.    HPI   Mrs. De Jesus returns today for followup for her adjuvant treatment of breast cancer.  Briefly, she is a 71-year-old  female who underwent a right lumpectomy for a 1.3 cm lobular carcinoma that was ER strongly positive, W[place] positive, and HER-2 negative. The Oncotype DX score was 10, which indicated that she had a 7% chance of recurrence within 10 years with tamoxifen alone.   Mrs. De Jesus received adjuvant radiation therapy and as of early April 2013, she has been on anastrozole which she has tolerated well.   A mammogram last October was read as BIRADS II, and a one year follow up was recommended.    Review of Systems  Overall she feels well and she has no complaints.  ECOG PS remains 1.  She does not experience any bone pain or stiffness, and she denies any depression, easy bruising, fevers, chills, night sweats, weight loss, nausea, vomiting, diarrhea, constipation, diplopia, blurred vision, shortness of breath, palpitations, or headaches.     Objective:      Physical Exam  GENERAL: She is alert, oriented to time, place, person, pleasant, well nourished, in no acute physical distress.   VITAL SIGNS: Reviewed.   HEENT: Normal. There are no nasal, oral, lip, gingival, auricular, lid, or conjunctival lesions. Mucosae are moist and pink, and there is no   thrush. Pupils are equal, reactive to light and accommodation. Extraocular muscle movements are intact.   NECK: Supple without JVD, adenopathy, or thyromegaly.   LUNGS: Clear to auscultation without wheezing, rales, or rhonchi.   CARDIOVASCULAR: Reveals an S1, S2, a grade I TAMIKO at the left sternal border, no rubs, and no gallops.   BREASTS: She is status post bilateral lumpectomies with well-healed incisions.   There are no masses in either breast.   Mild intertrigo of the right inframammary fold is seen.  ABDOMEN: Morbidly obese, soft,  nontender, without organomegaly.  A low median abdominal scar from her  is identified.  Bowel sounds are identified.   EXTREMITIES: No cyanosis or clubbing. There is no lower extremity edema.   NEUROLOGIC: DTRs are 0-1+ bilaterally, symmetrical, motor function is 5/5,   and cranial nerves within normal limits.  Assessment:       1. Breast cancer, stage 1, unspecified laterality    2. Use of aromatase inhibitors    3. Morbid obesity with BMI of 40.0-44.9, adult        Plan:        She will remain on anastrazole through 2018, and see me again in 4 months.  We will initiate the cancer type ID testing to determine whether she would benefit from extending her treatment to ten years.  Her mammogram will be repeated 8 months from now.  Her questions were answered to her satisfaction.

## 2017-03-30 ENCOUNTER — TELEPHONE (OUTPATIENT)
Dept: HEMATOLOGY/ONCOLOGY | Facility: CLINIC | Age: 72
End: 2017-03-30

## 2017-03-30 NOTE — TELEPHONE ENCOUNTER
----- Message from Sabrina Simms sent at 3/30/2017  1:02 PM CDT -----  Contact: DAVID with [employer]  David was retuning your phone call in regards to the message you left for him. David can be reached at [phone].

## 2017-03-30 NOTE — TELEPHONE ENCOUNTER
----- Message from Cheryle Rudolph sent at 3/30/2017  3:31 PM CDT -----  Contact: Chris from [employer]  Chris is calling to make sure that specimen received is okay to test on.    Chris can be reached at [phone].  Thank you

## 2017-03-30 NOTE — TELEPHONE ENCOUNTER
----- Message from Maria C Howard sent at 3/30/2017  9:52 AM CDT -----  Contact: Chris with [facility]  Chris would like to verify testing information for this pt     Contact number [phone]  Thanks

## 2017-04-19 ENCOUNTER — TELEPHONE (OUTPATIENT)
Dept: HEMATOLOGY/ONCOLOGY | Facility: CLINIC | Age: 72
End: 2017-04-19

## 2017-04-21 ENCOUNTER — INITIAL CONSULT (OUTPATIENT)
Dept: OPHTHALMOLOGY | Facility: CLINIC | Age: 72
End: 2017-04-21
Payer: MEDICARE

## 2017-04-21 DIAGNOSIS — H35.3130 AGE-RELATED MACULAR DEGENERATION, DRY, BOTH EYES: ICD-10-CM

## 2017-04-21 DIAGNOSIS — I10 ESSENTIAL HYPERTENSION: ICD-10-CM

## 2017-04-21 DIAGNOSIS — H52.7 REFRACTIVE ERROR: ICD-10-CM

## 2017-04-21 DIAGNOSIS — H25.13 NUCLEAR SCLEROSIS, BILATERAL: Primary | ICD-10-CM

## 2017-04-21 DIAGNOSIS — H26.9 CORTICAL CATARACT OF BOTH EYES: ICD-10-CM

## 2017-04-21 PROCEDURE — 92136 OPHTHALMIC BIOMETRY: CPT | Mod: PBBFAC,PO | Performed by: OPHTHALMOLOGY

## 2017-04-21 PROCEDURE — 99999 PR PBB SHADOW E&M-EST. PATIENT-LVL II: CPT | Mod: PBBFAC,,, | Performed by: OPHTHALMOLOGY

## 2017-04-21 PROCEDURE — 99212 OFFICE O/P EST SF 10 MIN: CPT | Mod: PBBFAC,PO | Performed by: OPHTHALMOLOGY

## 2017-04-21 PROCEDURE — 92014 COMPRE OPH EXAM EST PT 1/>: CPT | Mod: S$PBB,,, | Performed by: OPHTHALMOLOGY

## 2017-04-21 RX ORDER — DIFLUPREDNATE OPHTHALMIC 0.5 MG/ML
1 EMULSION OPHTHALMIC 4 TIMES DAILY
Qty: 5 ML | Refills: 1 | Status: SHIPPED | OUTPATIENT
Start: 2017-06-22 | End: 2017-04-27 | Stop reason: SDUPTHER

## 2017-04-21 RX ORDER — NEPAFENAC 3 MG/ML
1 SUSPENSION/ DROPS OPHTHALMIC DAILY
Qty: 3 ML | Refills: 1 | Status: SHIPPED | OUTPATIENT
Start: 2017-06-19 | End: 2017-04-27 | Stop reason: SDUPTHER

## 2017-04-21 RX ORDER — OFLOXACIN 3 MG/ML
1 SOLUTION/ DROPS OPHTHALMIC 4 TIMES DAILY
Qty: 5 ML | Refills: 1 | Status: SHIPPED | OUTPATIENT
Start: 2017-06-19 | End: 2017-04-27 | Stop reason: SDUPTHER

## 2017-04-21 NOTE — LETTER
April 21, 2017      Wang Agarwal, OD  [address]  [place] [address]           [facility]  [address] [facility] [address]  [place] [address]  Phone: [phone]  Fax: [phone]          Patient: Francoise De Jesus   MR Number: [id number]   YOB: 1945   Date of Visit: 4/21/2017       Dear Dr. Wang Agarwal:    Thank you for referring Francoise De Jesus to me for evaluation. Attached you will find relevant portions of my assessment and plan of care.    If you have questions, please do not hesitate to call me. I look forward to following Francoise De Jesus along with you.    Sincerely,    Gt Okeefe MD    Enclosure  CC:  No Recipients    If you would like to receive this communication electronically, please contact [email or web address] or [phone] to request more information on [facility] Link access.    For providers and/or their staff who would like to refer a patient to [facility], please contact us through our one-stop-shop provider referral line, [facility] , at [phone].    If you feel you have received this communication in error or would no longer like to receive these types of communications, please e-mail [email or web address]

## 2017-04-21 NOTE — PROGRESS NOTES
Subjective:       Patient ID: Francoise De Jesus is a 71 y.o. female.    Chief Complaint: Cataract (Cataract Eval per Dr. Agarwal)    HPI  Review of Systems    Objective:      Physical Exam    Assessment:       1. Nuclear sclerosis, bilateral    2. Cortical cataract of both eyes    3. Age-related macular degeneration, dry, both eyes    4. Essential hypertension    5. Refractive error        Plan:       Visually significant cataract OU -Pt. Wants Sx.     Dry AMD OU-Stable.  HTN-No retinopathy OU.  RE          CE OS 6/22/17 SN60WF 19.0,        OD 7/6/17 SN60WF 19.0.  AREDS/AG.  Control HTN.

## 2017-04-21 NOTE — MR AVS SNAPSHOT
[facility]  [address] [place] [address]  Phone: [phone]  Fax: [phone]                  Francoise De Jesus   2017 10:00 AM   Initial consult    Description:  Female : 1945   Provider:  Gt Okeefe MD   Department:  [facility]           Reason for Visit     Cataract           Diagnoses this Visit        Comments    Nuclear sclerosis, bilateral    -  Primary     Cortical cataract of both eyes         Age-related macular degeneration, dry, both eyes         Essential hypertension         Refractive error                To Do List           Future Appointments        Provider Department Dept Phone    2017 11:00 AM Gt Okeefe MD [facility] [phone]    2017 11:00 AM Gt Okeefe MD [facility] [phone]    2017 10:30 AM Gt Okeefe MD [facility] [phone]    2017 11:00 AM Gt Okeefe MD [facility] [phone]    2017 10:30 AM Gt Okeefe MD [facility] [phone]      Goals (5 Years of Data)     None      Follow-Up and Disposition     Return in about 2 months (around 2017) for CE OS.       These Medications        Disp Refills Start End    ILEVRO 0.3 % DrpS 3 mL 1 2017    Place 1 drop into both eyes once daily. For 30 days - Both Eyes    Pharmacy: [facility] [place] [email or web address] Ph #: [phone]       ofloxacin (OCUFLOX) 0.3 % ophthalmic solution 5 mL 1 2017    Place 1 drop into the left eye 4 (four) times daily. - Left Eye    Pharmacy: [facility] - [place] [employer] Ph #: [phone]       difluprednate (DUREZOL) 0.05 % Drop ophthalmic solution 5 mL 1 2017    Place 1 drop into the left eye 4 (four) times daily. For 30 days - Left Eye    [facility] [place]  [place] [address] [facility] Ph #: [phone]         [facility]     [facility] Care [facility] - 24/7 Assistance  Unless otherwise directed by your provider, please contact [facility], our nurse care line that is available for 24/7 assistance.     Registered nurses in the [facility] provide: appointment scheduling, clinical advisement, health education, and other advisory services.  Call: [phone] (toll free)               Medications           Message regarding Medications     Verify the changes and/or additions to your medication regime listed below are the same as discussed with your clinician today.  If any of these changes or additions are incorrect, please notify your healthcare provider.        START taking these NEW medications        Refills    ILEVRO 0.3 % DrpS 1    Starting on: 6/19/2017    Sig: Place 1 drop into both eyes once daily. For 30 days    Class: Normal    Route: Both Eyes    ofloxacin (OCUFLOX) 0.3 % ophthalmic solution 1    Starting on: 6/19/2017    Sig: Place 1 drop into the left eye 4 (four) times daily.    Class: Normal    Route: Left Eye    difluprednate (DUREZOL) 0.05 % Drop ophthalmic solution 1    Starting on: 6/22/2017    Sig: Place 1 drop into the left eye 4 (four) times daily. For 30 days    Class: Normal    Route: Left Eye           Verify that the below list of medications is an accurate representation of the medications you are currently taking.  If none reported, the list may be blank. If incorrect, please contact your healthcare provider. Carry this list with you in case of emergency.           Current Medications     acetaminophen (TYLENOL) 500 MG tablet Take 500 mg by mouth every 6 (six) hours as needed for Pain.    anastrozole (ARIMIDEX) 1 mg Tab Take 1 tablet (1 mg total) by mouth once daily.    ANTIOX#10/OM3/DHA/EPA/LUT/ZEAX (I-CAPS ORAL) Take 1 tablet by mouth once daily.    aspirin (ECOTRIN) 81 MG EC tablet Take 1 tablet (81  mg total) by mouth once daily.    fenofibrate (TRICOR) 145 MG tablet Take 1 tablet (145 mg total) by mouth once daily. 1 Tablet Oral Every evening    fish oil-omega-3 fatty acids (FISH OIL) 300-1,000 mg capsule     hydrochlorothiazide (HYDRODIURIL) 25 MG tablet Take 1 tablet (25 mg total) by mouth once daily. 1 Tablet Oral Every morning    lisinopril (PRINIVIL,ZESTRIL) 40 MG tablet Take 1 tablet (40 mg total) by mouth once daily. 1 Tablet Oral Every morning    omeprazole (PRILOSEC) 40 MG capsule Take 1 capsule (40 mg total) by mouth once daily.    pravastatin (PRAVACHOL) 10 MG tablet Take 1 tablet (10 mg total) by mouth once daily.    difluprednate (DUREZOL) 0.05 % Drop ophthalmic solution Starting on Jun 22, 2017. Place 1 drop into the left eye 4 (four) times daily. For 30 days    ILEVRO 0.3 % DrpS Starting on Jun 19, 2017. Place 1 drop into both eyes once daily. For 30 days    ofloxacin (OCUFLOX) 0.3 % ophthalmic solution Starting on Jun 19, 2017. Place 1 drop into the left eye 4 (four) times daily.           Clinical Reference Information           Allergies as of 4/21/2017     Medrol [Methylprednisolone]    Penicillins      Immunizations Administered on Date of Encounter - 4/21/2017     None      Orders Placed During Today's Visit      Normal Orders This Visit    Biometry       [facility] Sign-Up     Activating your [facility] account is as easy as 1-2-3!     1) Visit [email or web address], select Sign Up Now, enter this activation code and your date of birth, then select Next.  Activation code not generated  Current Patient Portal Status: Account disabled      2) Create a username and password to use when you visit [facility] in the future and select a security question in case you lose your password and select Next.    3) Enter your e-mail address and click Sign Up!    Additional Information  If you have questions, please e-mail [email or web address] or call [phone] to talk to our [facility] staff. Remember,  [facility] is NOT to be used for urgent needs. For medical emergencies, dial 911.         Language Assistance Services     ATTENTION: Language assistance services are available, free of charge. Please call [phone].      ATENCIÓN: Si habla yolanda, tiene a montemayor disposición servicios gratuitos de asistencia lingüística. Llame al [phone].     CHÚ Ý: N?u b?n nói Ti?ng Vi?t, có các d?ch v? h? tr? ngôn ng? mi?n phí dành cho b?n. G?i s? [phone].         [facility] complies with applicable Federal civil rights laws and does not discriminate on the basis of race, color, national origin, age, disability, or sex.

## 2017-04-27 ENCOUNTER — TELEPHONE (OUTPATIENT)
Dept: OPHTHALMOLOGY | Facility: CLINIC | Age: 72
End: 2017-04-27

## 2017-04-27 DIAGNOSIS — E78.5 HYPERLIPIDEMIA, UNSPECIFIED HYPERLIPIDEMIA TYPE: ICD-10-CM

## 2017-04-27 DIAGNOSIS — H25.13 NUCLEAR SCLEROSIS, BILATERAL: ICD-10-CM

## 2017-04-27 RX ORDER — OFLOXACIN 3 MG/ML
1 SOLUTION/ DROPS OPHTHALMIC 4 TIMES DAILY
Qty: 5 ML | Refills: 1 | Status: SHIPPED | OUTPATIENT
Start: 2017-06-19 | End: 2017-06-30

## 2017-04-27 RX ORDER — NEPAFENAC 3 MG/ML
1 SUSPENSION/ DROPS OPHTHALMIC DAILY
Qty: 3 ML | Refills: 1 | Status: SHIPPED | OUTPATIENT
Start: 2017-06-19 | End: 2017-07-19

## 2017-04-27 RX ORDER — FENOFIBRATE 145 MG/1
145 TABLET, FILM COATED ORAL DAILY
Qty: 90 TABLET | Refills: 1 | Status: SHIPPED | OUTPATIENT
Start: 2017-04-27 | End: 2017-11-21 | Stop reason: SDUPTHER

## 2017-04-27 RX ORDER — DIFLUPREDNATE OPHTHALMIC 0.5 MG/ML
1 EMULSION OPHTHALMIC 4 TIMES DAILY
Qty: 5 ML | Refills: 1 | Status: SHIPPED | OUTPATIENT
Start: 2017-06-22 | End: 2017-07-22

## 2017-04-27 NOTE — TELEPHONE ENCOUNTER
----- Message from Mali Paz sent at 4/27/2017  9:42 AM CDT -----  Contact: Francoise De Jesus   Pt would like to speak with  nurse about the 2 eye drop prescription pt can be reached at [phone] or [phone] please thanks .

## 2017-04-27 NOTE — TELEPHONE ENCOUNTER
----- Message from Abelino Dickerson sent at 4/27/2017 12:46 PM CDT -----  Contact: Francoise De Jesus [[id number]]  Pt returned call and would like to be called back at [phone],thanks

## 2017-04-27 NOTE — TELEPHONE ENCOUNTER
----- Message from Nicole Stack sent at 4/27/2017  9:49 AM CDT -----  Refill: fenofibrate (TRICOR) 145 MG tablet    Please send to [facility]. Thank you!

## 2017-04-27 NOTE — TELEPHONE ENCOUNTER
Returned pt phone called about eyedrops. Pt is not answered the call. I left a message to call the clinic when she can.

## 2017-05-16 ENCOUNTER — TELEPHONE (OUTPATIENT)
Dept: OPHTHALMOLOGY | Facility: CLINIC | Age: 72
End: 2017-05-16

## 2017-05-16 DIAGNOSIS — H25.12 NUCLEAR SCLEROSIS, LEFT: Primary | ICD-10-CM

## 2017-06-13 ENCOUNTER — TELEPHONE (OUTPATIENT)
Dept: OPTOMETRY | Facility: CLINIC | Age: 72
End: 2017-06-13

## 2017-06-13 DIAGNOSIS — H25.11 NUCLEAR SCLEROSIS, RIGHT: Primary | ICD-10-CM

## 2017-06-19 NOTE — PRE ADMISSION SCREENING
RN phone pre op done.  Instructed to remain NPO after midnight prior to surgery.  Expressed understanding of instructions.  Arrival time 05:45 am.

## 2017-06-19 NOTE — H&P
[facility]  History & Physical    Subjective:      Chief Complaint/Reason for Admission:     Francoise De Jesus is a 71 y.o. female.    Past Medical History:   Diagnosis Date    Arthritis     Atherosclerosis of aorta     noted on CXR 11/17/2014    Breast cancer 2011    stage I right breast cancer    History of peptic ulcer     Hyperlipidemia     Hypertension     Macular degeneration 3/13/2017    Morbid obesity     Nuclear sclerosis of both eyes 4/21/2017    Prediabetes      Past Surgical History:   Procedure Laterality Date    BREAST LUMPECTOMY Right 11/2011    right lumpectomy and SN biopsy    CHOLECYSTECTOMY       Family History   Problem Relation Age of Onset    Stomach cancer Mother     Lung cancer Father     Hypertension Father     Breast cancer Sister 58    Cataracts Sister     Macular degeneration Sister     Macular degeneration Brother      wet    Cataracts Sister     Macular degeneration Sister     Breast cancer Sister 72    Macular degeneration Sister     Macular degeneration Sister     Macular degeneration Brother      dry    Hypertension Brother     Diabetes Brother     Hypertension Brother     Prostate cancer Brother     Hypertension Brother     Hyperlipidemia Brother     No Known Problems Maternal Grandmother     No Known Problems Maternal Grandfather     No Known Problems Paternal Grandmother     No Known Problems Paternal Grandfather     Ovarian cancer Neg Hx     Amblyopia Neg Hx     Blindness Neg Hx     Glaucoma Neg Hx     Retinal detachment Neg Hx     Strabismus Neg Hx     Stroke Neg Hx     Thyroid disease Neg Hx      Social History   Substance Use Topics    Smoking status: Never Smoker    Smokeless tobacco: Never Used    Alcohol use No       No prescriptions prior to admission.     Review of patient's allergies indicates:   Allergen Reactions    Medrol [methylprednisolone] Other (See Comments)     Depression/tearful    Penicillins       Other reaction(s): Rash        Review of Systems   Eyes: Positive for blurred vision.   All other systems reviewed and are negative.      Objective:      Vital Signs (Most Recent)       Vital Signs Range (Last 24H):       Physical Exam   Constitutional: She is oriented to person, place, and time. She appears well-developed and well-nourished.   HENT:   Head: Normocephalic.   Eyes: Conjunctivae and EOM are normal. Pupils are equal, round, and reactive to light.   Neck: Normal range of motion. Neck supple.   Cardiovascular: Normal rate, regular rhythm and normal heart sounds.    Pulmonary/Chest: Effort normal and breath sounds normal.   Abdominal: Soft. Bowel sounds are normal.   Musculoskeletal: Normal range of motion.   Neurological: She is alert and oriented to person, place, and time.   Skin: Skin is warm.   Psychiatric: She has a normal mood and affect.       Data Review:    ECG:     Assessment:      Cataract OS.    Plan:    CE OS.

## 2017-06-22 ENCOUNTER — ANESTHESIA EVENT (OUTPATIENT)
Dept: SURGERY | Facility: HOSPITAL | Age: 72
End: 2017-06-22
Payer: MEDICARE

## 2017-06-22 ENCOUNTER — ANESTHESIA (OUTPATIENT)
Dept: SURGERY | Facility: HOSPITAL | Age: 72
End: 2017-06-22
Payer: MEDICARE

## 2017-06-22 ENCOUNTER — HOSPITAL ENCOUNTER (OUTPATIENT)
Facility: HOSPITAL | Age: 72
Discharge: HOME | End: 2017-06-22
Attending: OPHTHALMOLOGY | Admitting: OPHTHALMOLOGY
Payer: MEDICARE

## 2017-06-22 ENCOUNTER — SURGERY (OUTPATIENT)
Age: 72
End: 2017-06-22

## 2017-06-22 VITALS
SYSTOLIC BLOOD PRESSURE: 161 MMHG | RESPIRATION RATE: 14 BRPM | WEIGHT: 235 LBS | BODY MASS INDEX: 44.37 KG/M2 | DIASTOLIC BLOOD PRESSURE: 77 MMHG | HEART RATE: 80 BPM | OXYGEN SATURATION: 99 % | TEMPERATURE: 99 F | HEIGHT: 61 IN

## 2017-06-22 DIAGNOSIS — H25.10 SENILE NUCLEAR SCLEROSIS: ICD-10-CM

## 2017-06-22 PROCEDURE — V2632 POST CHMBR INTRAOCULAR LENS: HCPCS | Performed by: OPHTHALMOLOGY

## 2017-06-22 PROCEDURE — 25000003 PHARM REV CODE 250: Performed by: ANESTHESIOLOGY

## 2017-06-22 PROCEDURE — D9220A PRA ANESTHESIA: Mod: CRNA,,, | Performed by: NURSE ANESTHETIST, CERTIFIED REGISTERED

## 2017-06-22 PROCEDURE — 25000003 PHARM REV CODE 250: Performed by: OPHTHALMOLOGY

## 2017-06-22 PROCEDURE — 37000009 HC ANESTHESIA EA ADD 15 MINS: Performed by: OPHTHALMOLOGY

## 2017-06-22 PROCEDURE — 63600175 PHARM REV CODE 636 W HCPCS: Performed by: NURSE ANESTHETIST, CERTIFIED REGISTERED

## 2017-06-22 PROCEDURE — 63600175 PHARM REV CODE 636 W HCPCS: Performed by: OPHTHALMOLOGY

## 2017-06-22 PROCEDURE — 37000008 HC ANESTHESIA 1ST 15 MINUTES: Performed by: OPHTHALMOLOGY

## 2017-06-22 PROCEDURE — C9447 INJ, PHENYLEPHRINE KETOROLAC: HCPCS | Performed by: OPHTHALMOLOGY

## 2017-06-22 PROCEDURE — D9220A PRA ANESTHESIA: Mod: ANES,,, | Performed by: ANESTHESIOLOGY

## 2017-06-22 PROCEDURE — 36000706: Performed by: OPHTHALMOLOGY

## 2017-06-22 PROCEDURE — 71000015 HC POSTOP RECOV 1ST HR: Performed by: OPHTHALMOLOGY

## 2017-06-22 PROCEDURE — 36000707: Performed by: OPHTHALMOLOGY

## 2017-06-22 DEVICE — LENS 19.0 ACRYSOF: Type: IMPLANTABLE DEVICE | Site: EYE | Status: FUNCTIONAL

## 2017-06-22 RX ORDER — SODIUM CHLORIDE, SODIUM LACTATE, POTASSIUM CHLORIDE, CALCIUM CHLORIDE 600; 310; 30; 20 MG/100ML; MG/100ML; MG/100ML; MG/100ML
INJECTION, SOLUTION INTRAVENOUS CONTINUOUS
Status: DISCONTINUED | OUTPATIENT
Start: 2017-06-22 | End: 2017-06-22 | Stop reason: HOSPADM

## 2017-06-22 RX ORDER — SODIUM CHLORIDE 0.9 % (FLUSH) 0.9 %
3 SYRINGE (ML) INJECTION
Status: DISCONTINUED | OUTPATIENT
Start: 2017-06-22 | End: 2017-06-22 | Stop reason: HOSPADM

## 2017-06-22 RX ORDER — LIDOCAINE HYDROCHLORIDE 10 MG/ML
1 INJECTION, SOLUTION EPIDURAL; INFILTRATION; INTRACAUDAL; PERINEURAL ONCE
Status: DISCONTINUED | OUTPATIENT
Start: 2017-06-22 | End: 2017-06-22 | Stop reason: HOSPADM

## 2017-06-22 RX ORDER — CYCLOPENTOLATE HYDROCHLORIDE 10 MG/ML
1 SOLUTION/ DROPS OPHTHALMIC
Status: DISCONTINUED | OUTPATIENT
Start: 2017-06-22 | End: 2017-06-22 | Stop reason: HOSPADM

## 2017-06-22 RX ORDER — PHENYLEPHRINE HYDROCHLORIDE 25 MG/ML
1 SOLUTION/ DROPS OPHTHALMIC
Status: DISCONTINUED | OUTPATIENT
Start: 2017-06-22 | End: 2017-06-22 | Stop reason: HOSPADM

## 2017-06-22 RX ORDER — LIDOCAINE HYDROCHLORIDE 40 MG/ML
INJECTION, SOLUTION RETROBULBAR
Status: DISCONTINUED | OUTPATIENT
Start: 2017-06-22 | End: 2017-06-22 | Stop reason: HOSPADM

## 2017-06-22 RX ORDER — ONDANSETRON 2 MG/ML
4 INJECTION INTRAMUSCULAR; INTRAVENOUS EVERY 8 HOURS PRN
Status: DISCONTINUED | OUTPATIENT
Start: 2017-06-22 | End: 2017-06-22 | Stop reason: HOSPADM

## 2017-06-22 RX ORDER — TROPICAMIDE 10 MG/ML
1 SOLUTION/ DROPS OPHTHALMIC
Status: DISCONTINUED | OUTPATIENT
Start: 2017-06-22 | End: 2017-06-22 | Stop reason: HOSPADM

## 2017-06-22 RX ORDER — PREDNISOLONE ACETATE 10 MG/ML
SUSPENSION/ DROPS OPHTHALMIC
Status: DISCONTINUED | OUTPATIENT
Start: 2017-06-22 | End: 2017-06-22 | Stop reason: HOSPADM

## 2017-06-22 RX ORDER — PROPARACAINE HYDROCHLORIDE 5 MG/ML
1 SOLUTION/ DROPS OPHTHALMIC
Status: DISCONTINUED | OUTPATIENT
Start: 2017-06-22 | End: 2017-06-22 | Stop reason: HOSPADM

## 2017-06-22 RX ORDER — OFLOXACIN 3 MG/ML
1 SOLUTION/ DROPS OPHTHALMIC
Status: COMPLETED | OUTPATIENT
Start: 2017-06-22 | End: 2017-06-22

## 2017-06-22 RX ORDER — FENTANYL CITRATE 50 UG/ML
25 INJECTION, SOLUTION INTRAMUSCULAR; INTRAVENOUS EVERY 5 MIN PRN
Status: DISCONTINUED | OUTPATIENT
Start: 2017-06-22 | End: 2017-06-22 | Stop reason: HOSPADM

## 2017-06-22 RX ORDER — HYDROCODONE BITARTRATE AND ACETAMINOPHEN 5; 325 MG/1; MG/1
1 TABLET ORAL EVERY 4 HOURS PRN
Status: DISCONTINUED | OUTPATIENT
Start: 2017-06-22 | End: 2017-06-22 | Stop reason: HOSPADM

## 2017-06-22 RX ORDER — FENTANYL CITRATE 50 UG/ML
INJECTION, SOLUTION INTRAMUSCULAR; INTRAVENOUS
Status: DISCONTINUED | OUTPATIENT
Start: 2017-06-22 | End: 2017-06-22

## 2017-06-22 RX ORDER — ACETAMINOPHEN 325 MG/1
650 TABLET ORAL EVERY 4 HOURS PRN
Status: DISCONTINUED | OUTPATIENT
Start: 2017-06-22 | End: 2017-06-22 | Stop reason: HOSPADM

## 2017-06-22 RX ORDER — POVIDONE-IODINE 5 %
SOLUTION, NON-ORAL OPHTHALMIC (EYE)
Status: DISCONTINUED | OUTPATIENT
Start: 2017-06-22 | End: 2017-06-22 | Stop reason: HOSPADM

## 2017-06-22 RX ADMIN — FENTANYL CITRATE 50 MCG: 50 INJECTION INTRAMUSCULAR; INTRAVENOUS at 08:06

## 2017-06-22 RX ADMIN — SODIUM CHONDROITIN SULFATE / SODIUM HYALURONATE 1 ML: 0.55-0.5 INJECTION INTRAOCULAR at 07:06

## 2017-06-22 RX ADMIN — PHENYLEPHRINE HYDROCHLORIDE 1 DROP: 25 SOLUTION/ DROPS OPHTHALMIC at 06:06

## 2017-06-22 RX ADMIN — PROPARACAINE HYDROCHLORIDE 1 DROP: 5 SOLUTION/ DROPS OPHTHALMIC at 06:06

## 2017-06-22 RX ADMIN — LIDOCAINE HYDROCHLORIDE 3 ML: 40 INJECTION, SOLUTION RETROBULBAR; TOPICAL at 07:06

## 2017-06-22 RX ADMIN — FLUORESCEIN SODIUM 1 STRIP: 1 STRIP OPHTHALMIC at 07:06

## 2017-06-22 RX ADMIN — TROPICAMIDE 1 DROP: 10 SOLUTION/ DROPS OPHTHALMIC at 06:06

## 2017-06-22 RX ADMIN — OFLOXACIN 1 DROP: 3 SOLUTION OPHTHALMIC at 06:06

## 2017-06-22 RX ADMIN — BALANCED SALT SOLUTION ENRICHED WITH BICARBONATE, DEXTROSE, AND GLUTATHIONE 500 ML: KIT at 07:06

## 2017-06-22 RX ADMIN — FENTANYL CITRATE 50 MCG: 50 INJECTION INTRAMUSCULAR; INTRAVENOUS at 07:06

## 2017-06-22 RX ADMIN — POVIDONE-IODINE 30 ML: 5 SOLUTION OPHTHALMIC at 07:06

## 2017-06-22 RX ADMIN — CYCLOPENTOLATE HYDROCHLORIDE 1 DROP: 10 SOLUTION/ DROPS OPHTHALMIC at 06:06

## 2017-06-22 RX ADMIN — SODIUM CHLORIDE, SODIUM LACTATE, POTASSIUM CHLORIDE, AND CALCIUM CHLORIDE: .6; .31; .03; .02 INJECTION, SOLUTION INTRAVENOUS at 07:06

## 2017-06-22 RX ADMIN — PREDNISOLONE ACETATE 2 DROP: 10 SUSPENSION/ DROPS OPHTHALMIC at 07:06

## 2017-06-22 RX ADMIN — PHENYLEPHRINE AND KETOROLAC 4 ML: 10.16; 2.88 INJECTION, SOLUTION, CONCENTRATE INTRAOCULAR at 07:06

## 2017-06-22 RX ADMIN — ACETAMINOPHEN 650 MG: 325 TABLET ORAL at 08:06

## 2017-06-22 RX ADMIN — Medication 15 ML: at 07:06

## 2017-06-22 NOTE — ANESTHESIA PREPROCEDURE EVALUATION
06/22/2017  Francoise De Jesus is a 71 y.o., female.   Pre-operative evaluation for Procedure(s) (LRB):  PHACOEMULSIFICATION-ASPIRATION-CATARACT (Left)  INSERTION-INTRAOCULAR LENS (IOL) (Left)    Francoise De Jesus is a 71 y.o. female     Body mass index is 44.4 kg/m².      Patient Active Problem List   Diagnosis    Use of aromatase inhibitors    Breast cancer, stage 1    Benign hypertension with chronic kidney disease, stage III    Hyperlipidemia    Arthritis    CKD (chronic kidney disease) stage 3, GFR 30-59 ml/min    Morbid obesity with BMI of 40.0-44.9, adult    Prediabetes    Atherosclerosis of aorta    Macular degeneration    Nuclear sclerosis of both eyes    Cortical cataract of both eyes    Age-related macular degeneration, dry, both eyes    Refractive error    Essential hypertension    Senile nuclear sclerosis       Review of patient's allergies indicates:   Allergen Reactions    Medrol [methylprednisolone] Other (See Comments)     Depression/tearful    Penicillins      Other reaction(s): Rash       No current facility-administered medications on file prior to encounter.      Current Outpatient Prescriptions on File Prior to Encounter   Medication Sig Dispense Refill    acetaminophen (TYLENOL) 500 MG tablet Take 500 mg by mouth every 6 (six) hours as needed for Pain.      anastrozole (ARIMIDEX) 1 mg Tab Take 1 tablet (1 mg total) by mouth once daily. 90 tablet 2    ANTIOX#10/OM3/DHA/EPA/LUT/ZEAX (I-CAPS ORAL) Take 1 tablet by mouth once daily.      aspirin (ECOTRIN) 81 MG EC tablet Take 1 tablet (81 mg total) by mouth once daily.  0    difluprednate (DUREZOL) 0.05 % Drop ophthalmic solution Place 1 drop into the left eye 4 (four) times daily. For 30 days 5 mL 1    fenofibrate (TRICOR) 145 MG tablet Take 1 tablet (145 mg total) by mouth once daily. 1 Tablet Oral Every evening 90 tablet 1     hydrochlorothiazide (HYDRODIURIL) 25 MG tablet Take 1 tablet (25 mg total) by mouth once daily. 1 Tablet Oral Every morning 90 tablet 1    ILEVRO 0.3 % DrpS Place 1 drop into both eyes once daily. For 30 days 3 mL 1    lisinopril (PRINIVIL,ZESTRIL) 40 MG tablet Take 1 tablet (40 mg total) by mouth once daily. 1 Tablet Oral Every morning 90 tablet 1    ofloxacin (OCUFLOX) 0.3 % ophthalmic solution Place 1 drop into the left eye 4 (four) times daily. 5 mL 1    omeprazole (PRILOSEC) 40 MG capsule Take 1 capsule (40 mg total) by mouth once daily. 90 capsule 1    pravastatin (PRAVACHOL) 10 MG tablet Take 1 tablet (10 mg total) by mouth once daily. 90 tablet 1       Past Surgical History:   Procedure Laterality Date    BREAST LUMPECTOMY Right 11/2011    right lumpectomy and SN biopsy    CHOLECYSTECTOMY         Social History     Social History    Marital status:      Spouse name: N/A    Number of children: 2    Years of education: N/A     Occupational History          Social History Main Topics    Smoking status: Never Smoker    Smokeless tobacco: Never Used    Alcohol use No    Drug use: No    Sexual activity: Yes     Partners: Male     Other Topics Concern    Not on file     Social History Narrative    No narrative on file         Vital Signs Range (Last 24H):  Temp:  [36.7 °C (98.1 °F)]   Pulse:  [79]   Resp:  [18]   BP: (161)/(77)   SpO2:  [95 %]       Lab Results   Component Value Date    WBC 8.30 03/13/2017    HGB 12.1 03/13/2017    HCT 37.5 03/13/2017    MCV 92 03/13/2017     03/13/2017         Anesthesia Evaluation    I have reviewed the Patient Summary Reports.     I have reviewed the Medications.     Review of Systems  Anesthesia Hx:  No problems with previous Anesthesia Denies Hx of Anesthetic complications  History of prior surgery of interest to airway management or planning: Denies Family Hx of Anesthesia complications.   Denies Personal Hx of Anesthesia  complications.   Social:  No Alcohol Use, Non-Smoker    Hematology/Oncology:  Hematology Normal       -- Cancer in past history:  Breast right surgery    EENT/Dental:EENT/Dental Normal   Cardiovascular:   Exercise tolerance: good Hypertension    Pulmonary:  Pulmonary Normal    Renal/:   Chronic Renal Disease, CRI    Hepatic/GI:  Hepatic/GI Normal    Neurological:  Neurology Normal    Endocrine:  Endocrine Normal    Psych:  Psychiatric Normal           Physical Exam  General:  Morbid Obesity    Airway/Jaw/Neck:  Airway Findings: Mouth Opening: Normal Tongue: Normal  General Airway Assessment: Adult, Average  Mallampati: III  TM Distance: Normal, at least 6 cm  Jaw/Neck Findings:  Neck ROM: Normal ROM      Dental:  Dental Findings: In tact   Chest/Lungs:  Chest/Lungs Findings: Normal Respiratory Rate, Decreased Breath Sounds Bilateral, Clear to auscultation     Heart/Vascular:  Heart Findings: Rate: Normal  Rhythm: Regular Rhythm        Mental Status:  Mental Status Findings:  Alert and Oriented, Cooperative         Anesthesia Plan  Type of Anesthesia, risks & benefits discussed:  Anesthesia Type:  MAC  Patient's Preference:   Intra-op Monitoring Plan: standard ASA monitors  Intra-op Monitoring Plan Comments:   Post Op Pain Control Plan: IV/PO Opioids PRN  Post Op Pain Control Plan Comments:   Induction:   IV  Beta Blocker:  Patient is not currently on a Beta-Blocker (No further documentation required).       Informed Consent: Patient understands risks and agrees with Anesthesia plan.  Questions answered. Anesthesia consent signed with patient.  ASA Score: 3     Day of Surgery Review of History & Physical:    H&P update referred to the surgeon.         Ready For Surgery From Anesthesia Perspective.

## 2017-06-22 NOTE — BRIEF OP NOTE
Operative Note     SUMMARY     Surgery Date: 6/22/2017    Surgeon(s) and Role:      Gt Okeefe MD - Primary    Pre-op Diagnosis:  Nuclear sclerosis     Post-op/ Diagnosis:  Same    Final Diagnosis: Cataract    Procedure(s) (LRB):  PHACOEMULSIFICATION-ASPIRATION-CATARACT   INSERTION-INTRAOCULAR LENS (IOL)     Anesthesia: Monitored Anesthesia Care    Findings/Key Components:  Cataract    Outcome: Tolerated procedure well    Estimated Blood Loss: None         Specimens     None          Follow-up:  Tomorrow in clinic      Discharge Note      SUMMARY     Admit Date: 6/22/2017    Attending Physician: Gt Okeefe MD    Discharge Physician: Gt Okeefe MD    Discharge Date: 6/22/2017    Final Diagnosis: Cataract    Outcome: Tolerated procedure well    Disposition: Discharge to Home.    Medications:       Francoise De Jesus   Home Medication Instructions LIZANDRO:[id number]    Printed on:06/22/17 0810   Medication Information                      acetaminophen (TYLENOL) 500 MG tablet  Take 500 mg by mouth every 6 (six) hours as needed for Pain.             anastrozole (ARIMIDEX) 1 mg Tab  Take 1 tablet (1 mg total) by mouth once daily.             ANTIOX#10/OM3/DHA/EPA/LUT/ZEAX (I-CAPS ORAL)  Take 1 tablet by mouth once daily.             aspirin (ECOTRIN) 81 MG EC tablet  Take 1 tablet (81 mg total) by mouth once daily.             difluprednate (DUREZOL) 0.05 % Drop ophthalmic solution  Place 1 drop into the left eye 4 (four) times daily. For 30 days             fenofibrate (TRICOR) 145 MG tablet  Take 1 tablet (145 mg total) by mouth once daily. 1 Tablet Oral Every evening             hydrochlorothiazide (HYDRODIURIL) 25 MG tablet  Take 1 tablet (25 mg total) by mouth once daily. 1 Tablet Oral Every morning             ILEVRO 0.3 % DrpS  Place 1 drop into both eyes once daily. For 30 days             lisinopril (PRINIVIL,ZESTRIL) 40 MG tablet  Take 1 tablet (40 mg total) by mouth once daily. 1  Tablet Oral Every morning             ofloxacin (OCUFLOX) 0.3 % ophthalmic solution  Place 1 drop into the left eye 4 (four) times daily.             omeprazole (PRILOSEC) 40 MG capsule  Take 1 capsule (40 mg total) by mouth once daily.             pravastatin (PRAVACHOL) 10 MG tablet  Take 1 tablet (10 mg total) by mouth once daily.                   Patient Discharge Instructions:     Keep Schulte Shield over operated eye when not using drops.     DIET:  Regular    Activity: No heavy lifting or bending X 1 week.    Follow-up:  Tomorrow in clinic

## 2017-06-22 NOTE — TRANSFER OF CARE
"Anesthesia Transfer of Care Note    Patient: Francoise De Jesus    Procedure(s) Performed: Procedure(s) (LRB):  PHACOEMULSIFICATION-ASPIRATION-CATARACT (Left)  INSERTION-INTRAOCULAR LENS (IOL) (Left)    Patient location: [facility]    Anesthesia Type: MAC    Transport from OR: Transported from OR on room air with adequate spontaneous ventilation    Post pain: adequate analgesia    Post assessment: no apparent anesthetic complications and tolerated procedure well    Post vital signs: stable    Level of consciousness: awake, alert and oriented    Nausea/Vomiting: no nausea/vomiting    Complications: none    Transfer of care protocol was followed      Last vitals:   Visit Vitals  BP (!) 161/77 (BP Location: Left arm, Patient Position: Lying, BP Method: Automatic)   Pulse 80   Temp 37 °C (98.6 °F) (Oral)   Resp 14   Ht 5' 1" (1.549 m)   Wt 106.6 kg (235 lb)   SpO2 99%   Breastfeeding? No   BMI 44.40 kg/m²     "

## 2017-06-23 ENCOUNTER — OFFICE VISIT (OUTPATIENT)
Dept: OPHTHALMOLOGY | Facility: CLINIC | Age: 72
End: 2017-06-23
Payer: MEDICARE

## 2017-06-23 VITALS — DIASTOLIC BLOOD PRESSURE: 65 MMHG | SYSTOLIC BLOOD PRESSURE: 124 MMHG | HEART RATE: 91 BPM

## 2017-06-23 DIAGNOSIS — Z98.890 POST-OPERATIVE STATE: Primary | ICD-10-CM

## 2017-06-23 DIAGNOSIS — H35.3130 AGE-RELATED MACULAR DEGENERATION, DRY, BOTH EYES: ICD-10-CM

## 2017-06-23 PROCEDURE — 99024 POSTOP FOLLOW-UP VISIT: CPT | Mod: ,,, | Performed by: OPHTHALMOLOGY

## 2017-06-23 PROCEDURE — 99999 PR PBB SHADOW E&M-EST. PATIENT-LVL III: CPT | Mod: PBBFAC,,, | Performed by: OPHTHALMOLOGY

## 2017-06-23 PROCEDURE — 99213 OFFICE O/P EST LOW 20 MIN: CPT | Mod: PBBFAC,PO | Performed by: OPHTHALMOLOGY

## 2017-06-23 NOTE — PROGRESS NOTES
Subjective:       Patient ID: Francoise De Jesus is a 71 y.o. female.    Chief Complaint: Post-op Evaluation    HPI  Review of Systems    Objective:      Physical Exam    Assessment:       1. Post-operative state    2. Age-related macular degeneration, dry, both eyes        Plan:       S/p CE OS- Doing well.     Dry AMD OU-Stable.        CPM OS.  RTC 1 wk.

## 2017-06-23 NOTE — ANESTHESIA POSTPROCEDURE EVALUATION
"Anesthesia Post Evaluation    Patient: Francoise De Jeuss    Procedure(s) Performed: Procedure(s) (LRB):  PHACOEMULSIFICATION-ASPIRATION-CATARACT (Left)  INSERTION-INTRAOCULAR LENS (IOL) (Left)    Final Anesthesia Type: MAC  Patient location during evaluation: PACU  Patient participation: Yes- Able to Participate  Level of consciousness: awake and alert and oriented  Post-procedure vital signs: reviewed and stable  Pain management: adequate  Airway patency: patent  PONV status at discharge: No PONV  Anesthetic complications: no      Cardiovascular status: blood pressure returned to baseline, hemodynamically stable and hypertensive  Respiratory status: unassisted, spontaneous ventilation and room air  Hydration status: euvolemic  Follow-up not needed.        Visit Vitals  BP (!) 161/77 (BP Location: Left arm, Patient Position: Lying, BP Method: Automatic)   Pulse 80   Temp 37 °C (98.6 °F) (Oral)   Resp 14   Ht 5' 1" (1.549 m)   Wt 106.6 kg (235 lb)   SpO2 99%   Breastfeeding? No   BMI 44.40 kg/m²       Pain/Se Score: Pain Assessment Performed: Yes (6/22/2017  8:32 AM)  Presence of Pain: complains of pain/discomfort (6/22/2017  8:32 AM)  Pain Rating Prior to Med Admin: 2 (6/22/2017  8:32 AM)  Pain Rating Post Med Admin: 1 (6/22/2017  8:54 AM)  Modified Se Score: 20 (6/22/2017  8:13 AM)      "

## 2017-06-30 ENCOUNTER — OFFICE VISIT (OUTPATIENT)
Dept: OPHTHALMOLOGY | Facility: CLINIC | Age: 72
End: 2017-06-30
Payer: MEDICARE

## 2017-06-30 DIAGNOSIS — H25.11 NUCLEAR SCLEROSIS, RIGHT: ICD-10-CM

## 2017-06-30 DIAGNOSIS — Z98.890 POST-OPERATIVE STATE: Primary | ICD-10-CM

## 2017-06-30 PROCEDURE — 99999 PR PBB SHADOW E&M-EST. PATIENT-LVL II: CPT | Mod: PBBFAC,,, | Performed by: OPHTHALMOLOGY

## 2017-06-30 PROCEDURE — 92136 OPHTHALMIC BIOMETRY: CPT | Mod: PBBFAC,PO | Performed by: OPHTHALMOLOGY

## 2017-06-30 PROCEDURE — 99024 POSTOP FOLLOW-UP VISIT: CPT | Mod: ,,, | Performed by: OPHTHALMOLOGY

## 2017-06-30 PROCEDURE — 92136 OPHTHALMIC BIOMETRY: CPT | Mod: 26,S$PBB,RT, | Performed by: OPHTHALMOLOGY

## 2017-06-30 PROCEDURE — 99212 OFFICE O/P EST SF 10 MIN: CPT | Mod: PBBFAC,PO | Performed by: OPHTHALMOLOGY

## 2017-06-30 NOTE — PRE ADMISSION SCREENING
RN Phone pre op done.  Instructed to remain NPO after midnight.  Expressed understanding of instructions.  Arrival time 05:30 am.

## 2017-06-30 NOTE — PROGRESS NOTES
Subjective:       Patient ID: Francoise De Jesus is a 71 y.o. female.    Chief Complaint: Post-op Evaluation (1 week po os)    HPI  Review of Systems    Objective:      Physical Exam    Assessment:       1. Post-operative state    2. Nuclear sclerosis, right        Plan:       S/p CE OS- Doing well.     Visually significant cataract OD -Pt. Wants Sx.        Taper gtts OS.  CE OD 7/6/17.

## 2017-07-02 NOTE — H&P
[facility]  History & Physical    Subjective:      Chief Complaint/Reason for Admission:     Francoise De Jesus is a 71 y.o. female.    Past Medical History:   Diagnosis Date    Arthritis     Atherosclerosis of aorta     noted on CXR 11/17/2014    Breast cancer 2011    stage I right breast cancer    History of peptic ulcer     Hyperlipidemia     Hypertension     Macular degeneration 3/13/2017    Morbid obesity     Nuclear sclerosis of both eyes 4/21/2017    Prediabetes      Past Surgical History:   Procedure Laterality Date    BREAST LUMPECTOMY Right 11/2011    right lumpectomy and SN biopsy    CATARACT EXTRACTION Left 07/22/2017    Dr. Okeefe    CHOLECYSTECTOMY      EYE SURGERY      Lt cataract     Family History   Problem Relation Age of Onset    Stomach cancer Mother     Lung cancer Father     Hypertension Father     Breast cancer Sister 58    Cataracts Sister     Macular degeneration Sister     Macular degeneration Brother      wet    Cataracts Sister     Macular degeneration Sister     Breast cancer Sister 72    Macular degeneration Sister     Macular degeneration Sister     Macular degeneration Brother      dry    Hypertension Brother     Diabetes Brother     Hypertension Brother     Prostate cancer Brother     Hypertension Brother     Hyperlipidemia Brother     No Known Problems Maternal Grandmother     No Known Problems Maternal Grandfather     No Known Problems Paternal Grandmother     No Known Problems Paternal Grandfather     Ovarian cancer Neg Hx     Amblyopia Neg Hx     Blindness Neg Hx     Glaucoma Neg Hx     Retinal detachment Neg Hx     Strabismus Neg Hx     Stroke Neg Hx     Thyroid disease Neg Hx      Social History   Substance Use Topics    Smoking status: Never Smoker    Smokeless tobacco: Never Used    Alcohol use No       No prescriptions prior to admission.     Review of patient's allergies indicates:   Allergen Reactions     Medrol [methylprednisolone] Other (See Comments)     Depression/tearful    Penicillins      Other reaction(s): Rash        Review of Systems   Eyes: Positive for blurred vision.   All other systems reviewed and are negative.      Objective:      Vital Signs (Most Recent)       Vital Signs Range (Last 24H):       Physical Exam   Constitutional: She is oriented to person, place, and time. She appears well-developed and well-nourished.   HENT:   Head: Normocephalic.   Eyes: Conjunctivae and EOM are normal. Pupils are equal, round, and reactive to light.   Neck: Normal range of motion. Neck supple.   Cardiovascular: Normal rate, regular rhythm and normal heart sounds.    Pulmonary/Chest: Effort normal and breath sounds normal.   Abdominal: Soft. Bowel sounds are normal.   Musculoskeletal: Normal range of motion.   Neurological: She is alert and oriented to person, place, and time.   Skin: Skin is warm.   Psychiatric: She has a normal mood and affect.       Data Review:    ECG:      Assessment:      Cataract OD.    Plan:    CE OD.

## 2017-07-05 ENCOUNTER — ANESTHESIA EVENT (OUTPATIENT)
Dept: SURGERY | Facility: HOSPITAL | Age: 72
End: 2017-07-05
Payer: MEDICARE

## 2017-07-06 ENCOUNTER — ANESTHESIA (OUTPATIENT)
Dept: SURGERY | Facility: HOSPITAL | Age: 72
End: 2017-07-06
Payer: MEDICARE

## 2017-07-06 ENCOUNTER — HOSPITAL ENCOUNTER (OUTPATIENT)
Facility: HOSPITAL | Age: 72
Discharge: HOME | End: 2017-07-06
Attending: OPHTHALMOLOGY | Admitting: OPHTHALMOLOGY
Payer: MEDICARE

## 2017-07-06 VITALS
SYSTOLIC BLOOD PRESSURE: 164 MMHG | WEIGHT: 235 LBS | OXYGEN SATURATION: 99 % | DIASTOLIC BLOOD PRESSURE: 79 MMHG | BODY MASS INDEX: 44.37 KG/M2 | HEART RATE: 74 BPM | TEMPERATURE: 98 F | RESPIRATION RATE: 18 BRPM | HEIGHT: 61 IN

## 2017-07-06 DIAGNOSIS — H25.10 SENILE NUCLEAR SCLEROSIS: ICD-10-CM

## 2017-07-06 PROCEDURE — 63600175 PHARM REV CODE 636 W HCPCS: Performed by: OPHTHALMOLOGY

## 2017-07-06 PROCEDURE — 25000003 PHARM REV CODE 250: Performed by: ANESTHESIOLOGY

## 2017-07-06 PROCEDURE — D9220A PRA ANESTHESIA: Mod: ANES,,, | Performed by: ANESTHESIOLOGY

## 2017-07-06 PROCEDURE — V2632 POST CHMBR INTRAOCULAR LENS: HCPCS | Performed by: OPHTHALMOLOGY

## 2017-07-06 PROCEDURE — 36000707: Performed by: OPHTHALMOLOGY

## 2017-07-06 PROCEDURE — 66984 XCAPSL CTRC RMVL W/O ECP: CPT | Mod: 79,RT,, | Performed by: OPHTHALMOLOGY

## 2017-07-06 PROCEDURE — 37000009 HC ANESTHESIA EA ADD 15 MINS: Performed by: OPHTHALMOLOGY

## 2017-07-06 PROCEDURE — 63600175 PHARM REV CODE 636 W HCPCS: Performed by: NURSE ANESTHETIST, CERTIFIED REGISTERED

## 2017-07-06 PROCEDURE — 25000003 PHARM REV CODE 250: Performed by: OPHTHALMOLOGY

## 2017-07-06 PROCEDURE — D9220A PRA ANESTHESIA: Mod: CRNA,,, | Performed by: NURSE ANESTHETIST, CERTIFIED REGISTERED

## 2017-07-06 PROCEDURE — 36000706: Performed by: OPHTHALMOLOGY

## 2017-07-06 PROCEDURE — 37000008 HC ANESTHESIA 1ST 15 MINUTES: Performed by: OPHTHALMOLOGY

## 2017-07-06 PROCEDURE — 71000015 HC POSTOP RECOV 1ST HR: Performed by: OPHTHALMOLOGY

## 2017-07-06 PROCEDURE — C9447 INJ, PHENYLEPHRINE KETOROLAC: HCPCS | Performed by: OPHTHALMOLOGY

## 2017-07-06 DEVICE — LENS 19.0 ACRYSOF: Type: IMPLANTABLE DEVICE | Site: EYE | Status: FUNCTIONAL

## 2017-07-06 RX ORDER — HYDROCODONE BITARTRATE AND ACETAMINOPHEN 5; 325 MG/1; MG/1
1 TABLET ORAL EVERY 4 HOURS PRN
Status: DISCONTINUED | OUTPATIENT
Start: 2017-07-06 | End: 2017-07-06 | Stop reason: HOSPADM

## 2017-07-06 RX ORDER — POVIDONE-IODINE 5 %
SOLUTION, NON-ORAL OPHTHALMIC (EYE)
Status: DISCONTINUED | OUTPATIENT
Start: 2017-07-06 | End: 2017-07-06 | Stop reason: HOSPADM

## 2017-07-06 RX ORDER — OFLOXACIN 3 MG/ML
1 SOLUTION/ DROPS OPHTHALMIC
Status: COMPLETED | OUTPATIENT
Start: 2017-07-06 | End: 2017-07-06

## 2017-07-06 RX ORDER — CYCLOPENTOLATE HYDROCHLORIDE 10 MG/ML
1 SOLUTION/ DROPS OPHTHALMIC
Status: COMPLETED | OUTPATIENT
Start: 2017-07-06 | End: 2017-07-06

## 2017-07-06 RX ORDER — LIDOCAINE HYDROCHLORIDE 40 MG/ML
INJECTION, SOLUTION RETROBULBAR
Status: DISCONTINUED | OUTPATIENT
Start: 2017-07-06 | End: 2017-07-06 | Stop reason: HOSPADM

## 2017-07-06 RX ORDER — ACETAMINOPHEN 325 MG/1
650 TABLET ORAL EVERY 4 HOURS PRN
Status: DISCONTINUED | OUTPATIENT
Start: 2017-07-06 | End: 2017-07-06 | Stop reason: HOSPADM

## 2017-07-06 RX ORDER — PHENYLEPHRINE HYDROCHLORIDE 25 MG/ML
1 SOLUTION/ DROPS OPHTHALMIC
Status: DISCONTINUED | OUTPATIENT
Start: 2017-07-06 | End: 2017-07-06 | Stop reason: HOSPADM

## 2017-07-06 RX ORDER — PREDNISOLONE ACETATE 10 MG/ML
SUSPENSION/ DROPS OPHTHALMIC
Status: DISCONTINUED | OUTPATIENT
Start: 2017-07-06 | End: 2017-07-06 | Stop reason: HOSPADM

## 2017-07-06 RX ORDER — FENTANYL CITRATE 50 UG/ML
INJECTION, SOLUTION INTRAMUSCULAR; INTRAVENOUS
Status: DISCONTINUED | OUTPATIENT
Start: 2017-07-06 | End: 2017-07-06

## 2017-07-06 RX ORDER — PROPARACAINE HYDROCHLORIDE 5 MG/ML
1 SOLUTION/ DROPS OPHTHALMIC
Status: DISCONTINUED | OUTPATIENT
Start: 2017-07-06 | End: 2017-07-06 | Stop reason: HOSPADM

## 2017-07-06 RX ORDER — SODIUM CHLORIDE, SODIUM LACTATE, POTASSIUM CHLORIDE, CALCIUM CHLORIDE 600; 310; 30; 20 MG/100ML; MG/100ML; MG/100ML; MG/100ML
INJECTION, SOLUTION INTRAVENOUS CONTINUOUS
Status: DISCONTINUED | OUTPATIENT
Start: 2017-07-06 | End: 2017-07-06 | Stop reason: HOSPADM

## 2017-07-06 RX ORDER — LIDOCAINE HYDROCHLORIDE 10 MG/ML
1 INJECTION, SOLUTION EPIDURAL; INFILTRATION; INTRACAUDAL; PERINEURAL ONCE
Status: DISCONTINUED | OUTPATIENT
Start: 2017-07-06 | End: 2017-07-06 | Stop reason: HOSPADM

## 2017-07-06 RX ORDER — TROPICAMIDE 10 MG/ML
1 SOLUTION/ DROPS OPHTHALMIC
Status: DISCONTINUED | OUTPATIENT
Start: 2017-07-06 | End: 2017-07-06 | Stop reason: HOSPADM

## 2017-07-06 RX ADMIN — OFLOXACIN 1 DROP: 3 SOLUTION OPHTHALMIC at 05:07

## 2017-07-06 RX ADMIN — PROPARACAINE HYDROCHLORIDE 1 DROP: 5 SOLUTION/ DROPS OPHTHALMIC at 05:07

## 2017-07-06 RX ADMIN — FENTANYL CITRATE 50 MCG: 50 INJECTION INTRAMUSCULAR; INTRAVENOUS at 07:07

## 2017-07-06 RX ADMIN — OFLOXACIN 1 DROP: 3 SOLUTION OPHTHALMIC at 06:07

## 2017-07-06 RX ADMIN — TROPICAMIDE 1 DROP: 10 SOLUTION/ DROPS OPHTHALMIC at 06:07

## 2017-07-06 RX ADMIN — TROPICAMIDE 1 DROP: 10 SOLUTION/ DROPS OPHTHALMIC at 05:07

## 2017-07-06 RX ADMIN — SODIUM CHLORIDE, SODIUM LACTATE, POTASSIUM CHLORIDE, AND CALCIUM CHLORIDE: .6; .31; .03; .02 INJECTION, SOLUTION INTRAVENOUS at 06:07

## 2017-07-06 RX ADMIN — PHENYLEPHRINE HYDROCHLORIDE 1 DROP: 25 SOLUTION/ DROPS OPHTHALMIC at 05:07

## 2017-07-06 RX ADMIN — PHENYLEPHRINE HYDROCHLORIDE 1 DROP: 25 SOLUTION/ DROPS OPHTHALMIC at 06:07

## 2017-07-06 RX ADMIN — CYCLOPENTOLATE HYDROCHLORIDE 1 DROP: 10 SOLUTION/ DROPS OPHTHALMIC at 05:07

## 2017-07-06 RX ADMIN — CYCLOPENTOLATE HYDROCHLORIDE 1 DROP: 10 SOLUTION/ DROPS OPHTHALMIC at 06:07

## 2017-07-06 NOTE — BRIEF OP NOTE
Operative Note     SUMMARY     Surgery Date: 7/6/2017    Surgeon(s) and Role:      Gt Okeefe MD - Primary    Pre-op Diagnosis:  Nuclear sclerosis     Post-op/ Diagnosis:  Same    Final Diagnosis: Cataract    Procedure(s) (LRB):  PHACOEMULSIFICATION-ASPIRATION-CATARACT   INSERTION-INTRAOCULAR LENS (IOL)     Anesthesia: Monitored Anesthesia Care    Findings/Key Components:  Cataract    Outcome: Tolerated procedure well    Estimated Blood Loss: None         Specimens     None          Follow-up:  Tomorrow in clinic      Discharge Note      SUMMARY     Admit Date: 7/6/2017    Attending Physician: Gt Okeefe MD    Discharge Physician: Gt Okeefe MD    Discharge Date: 7/6/2017    Final Diagnosis: Cataract    Outcome: Tolerated procedure well    Disposition: Discharge to Home.    Medications:       Francoise De Jesus   Home Medication Instructions LIZANDRO:[id number]    Printed on:07/06/17 0738   Medication Information                      acetaminophen (TYLENOL) 500 MG tablet  Take 500 mg by mouth every 6 (six) hours as needed for Pain.             anastrozole (ARIMIDEX) 1 mg Tab  Take 1 tablet (1 mg total) by mouth once daily.             ANTIOX#10/OM3/DHA/EPA/LUT/ZEAX (I-CAPS ORAL)  Take 1 tablet by mouth once daily.             aspirin (ECOTRIN) 81 MG EC tablet  Take 1 tablet (81 mg total) by mouth once daily.             difluprednate (DUREZOL) 0.05 % Drop ophthalmic solution  Place 1 drop into the left eye 4 (four) times daily. For 30 days             fenofibrate (TRICOR) 145 MG tablet  Take 1 tablet (145 mg total) by mouth once daily. 1 Tablet Oral Every evening             hydrochlorothiazide (HYDRODIURIL) 25 MG tablet  Take 1 tablet (25 mg total) by mouth once daily. 1 Tablet Oral Every morning             ILEVRO 0.3 % DrpS  Place 1 drop into both eyes once daily. For 30 days             lisinopril (PRINIVIL,ZESTRIL) 40 MG tablet  Take 1 tablet (40 mg total) by mouth once daily. 1 Tablet  Oral Every morning             omeprazole (PRILOSEC) 40 MG capsule  Take 1 capsule (40 mg total) by mouth once daily.             pravastatin (PRAVACHOL) 10 MG tablet  Take 1 tablet (10 mg total) by mouth once daily.                   Patient Discharge Instructions:     Keep Schulte Shield over operated eye when not using drops.     DIET:  Regular    Activity: No heavy lifting or bending X 1 week.    Follow-up:  Tomorrow in clinic

## 2017-07-06 NOTE — OP NOTE
DATE OF PROCEDURE:  07/06/2017.    PREOPERATIVE DIAGNOSIS:  Nuclear sclerotic cataract, right eye.    POSTOPERATIVE DIAGNOSIS:  Nuclear sclerotic cataract, right eye.    SURGEON:  Gt Okeefe M.D.    PROCEDURE:  Clear corneal phacoemulsification with posterior chamber intraocular   lens implant, right eye.     ANESTHESIA:  Monitored anesthesia care with 2% Xylocaine jelly topically, 1%   free lidocaine topically and intrachamberly.     PROCEDURE IN DETAIL:  After the appropriate preoperative consent was obtained,   the patient had the 2% Xylocaine jelly applied to the cornea.  The patient was   then brought to the operating room and placed into the supine position.  The   right periorbit was then prepped and draped in the usual sterile ophthalmic   fashion.  A lid speculum was then inserted into the right eye.  Several drops of   the 1% lidocaine were placed onto the right cornea.  The 1% lidocaine was also   applied to the perilimbal region with the Weck-joe sponge.  Using the 0.12-mm   forceps and the Super Sharp blade, a paracentesis site was made at the 12   o'clock position.  Approximately 0.5 mL of the 1% lidocaine was injected into   the anterior chamber.  Next, Viscoat was injected into the anterior chamber   through the paracentesis site.  The 2.75-mm keratome and the cyclodialysis   spatula were used to create a 2.75-mm clear corneal temporal groove.  The   cystitomy needle and Utrata forceps were then used to create a continuous tear   360-degree capsulorrhexis.  BSS in a cannula was then used for hydrodissection.    Phacoemulsification then proceeded in a stop-and-chop fashion without any   complications.  Another 0.5 mL of the 1% lidocaine was injected into the   anterior chamber.  The curved tip irrigation aspiration handpiece was then used   to remove the residual cortical material from the capsular bag.  Again, the 1%   lidocaine was applied to the perilimbal region with the Weck-joe sponge.   Healon   GV was then injected into the anterior chamber and capsular bag.  An [employer] intraocular lens model SN60WF, 19.0 diopters in power and serial   [id number] was injected into the capsular bag with the lens injector.  The   Sinskey hook was used to position the lens into its proper place.  The residual   viscoelastic material was removed from the anterior chamber and capsular bag   with the curved tip irrigation aspiration handpiece.  Both wounds were hydrated   with BSS on a cannula.  Both wounds were checked and found to be watertight.    The lid speculum was then removed from the right eye.  The patient then had 1   drop of Vigamox ophthalmic drop and 1 drop of Econopred +1% ophthalmic drop   instilled onto the right cornea.  The eye was then shielded.  The patient   tolerated the procedure well and was then brought to the recovery room in good   condition.      JASON  dd: 07/13/2017 08:07:00 (CDT)  td: 07/13/2017 13:01:28 (CDT)  Doc ID   #[id number]  Job ID #[id number]    CC:

## 2017-07-06 NOTE — ANESTHESIA POSTPROCEDURE EVALUATION
"Anesthesia Post Evaluation    Patient: Francoise De Jesus    Procedure(s) Performed: Procedure(s) (LRB):  PHACOEMULSIFICATION-ASPIRATION-CATARACT (Right)  INSERTION-INTRAOCULAR LENS (IOL) (Right)    Final Anesthesia Type: MAC  Patient location during evaluation: PACU  Patient participation: Yes- Able to Participate  Level of consciousness: awake and alert and oriented  Post-procedure vital signs: reviewed and stable  Pain management: adequate  Airway patency: patent  PONV status at discharge: No PONV  Anesthetic complications: no      Cardiovascular status: blood pressure returned to baseline and hemodynamically stable  Respiratory status: unassisted, spontaneous ventilation and room air  Hydration status: euvolemic  Follow-up not needed.        Visit Vitals  BP (!) 154/78 (BP Location: Left arm)   Pulse 82   Temp 37 °C (98.6 °F) (Oral)   Resp 14   Ht 5' 1" (1.549 m)   Wt 106.6 kg (235 lb)   SpO2 99%   Breastfeeding? No   BMI 44.40 kg/m²       Pain/Se Score: Pain Assessment Performed: Yes (7/6/2017  6:00 AM)  Presence of Pain: denies (7/6/2017  6:00 AM)      "

## 2017-07-06 NOTE — TRANSFER OF CARE
"Anesthesia Transfer of Care Note    Patient: Francoise De Jesus    Procedure(s) Performed: Procedure(s) (LRB):  PHACOEMULSIFICATION-ASPIRATION-CATARACT (Right)  INSERTION-INTRAOCULAR LENS (IOL) (Right)    Patient location: [facility]    Anesthesia Type: MAC    Transport from OR: Transported from OR on room air with adequate spontaneous ventilation    Post pain: adequate analgesia    Post assessment: no apparent anesthetic complications and tolerated procedure well    Post vital signs: stable    Level of consciousness: awake, alert and oriented    Nausea/Vomiting: no nausea/vomiting    Complications: none    Transfer of care protocol was followed      Last vitals:   Visit Vitals  BP (!) 154/78 (BP Location: Left arm)   Pulse 82   Temp 37 °C (98.6 °F) (Oral)   Resp 14   Ht 5' 1" (1.549 m)   Wt 106.6 kg (235 lb)   SpO2 99%   Breastfeeding? No   BMI 44.40 kg/m²     "

## 2017-07-06 NOTE — DISCHARGE INSTRUCTIONS
ACTIVITY LEVEL:  If you received sedation or an anesthetic, you may feel sleepy for several hours. Rest until you are more awake. Gradually resume your normal activities. Normal activity will cause no undue risk to your eye. The white part of your eye might be red - this is nothing to worry about. Wear your old glasses or sunglasses that were given to you for protection during the day.    RESTRICTIONS - for the next 7 days  · Do not lift anything over 10 pounds.  · When bending, bend at the knees not the waist.  · Do not rub the eye.  · Do not get water in the eye.  · Do not sleep on the side that had surgery.  · Protect your eye at bedtime with the shield provided.    DIET: At home, continue with liquids. If there is no nausea, you may eat a soft diet and gradually resume your normal diet. Limit alcohol intake for 24 hours.    BATHING: You may shower or bathe as normal. You may take a warm wash cloth, which has been wrung out to remove excess water, and gently remove crusting on the lashes.    MEDICATIONS: You may take Extra Strength Tylenol every 4 hours for pain/headache.     Use your drops     Today: Pink-12 noon , 4 PM, 8PM     Beige -12 noon, 4 PM, 8 PM      Grey-12 noon    Tomorrow:  Resume pink and beige cap drops four times a day.  Resume grey cap drops once a day.    Place one drop in the eye that had surgery from the first bottle. Wait 5 minutes and then use the second bottle. (It does not matter which bottle is used first.) CONTINUE all glaucoma drops.   No driving, alcoholic beverages or signing legal documents for next 24 hours or while taking pain medication      WHEN TO CALL THE DOCTOR:  · Redness that increases significantly  · Pain not relieved by Tylenol  RETURN APPOINTMENT:  You will need to see Dr. Okeefe on Friday at the [facility] at__10:30 AM_____________. Bring your eye kit with you on your follow-up visit. Your kit contains sunglasses, eye shield, tape and your eye drops.  FOR  EMERGENCIES:  If any unusual problems or difficulties occur, contact Dr. Okeefe at the Clinic office at [phone] during normal business hours. If after hours, call [phone].  Fall Prevention  Millions of people fall every year and injure themselves. You may have had anesthesia or sedation which may increase your risk of falling. You may have health issues that put you at an increased risk of falling.     Here are ways to reduce your risk of falling.  ·   · Make your home safe by keeping walkways clear of objects you may trip over.  · Use non-slip pads under rugs. Do not use area rugs or small throw rugs.  · Use non-slip mats in bathtubs and showers.  · Install handrails and lights on staircases.  · Do not walk in poorly lit areas.  · Do not stand on chairs or wobbly ladders.  · Use caution when reaching overhead or looking upward. This position can cause a loss of balance.  · Be sure your shoes fit properly, have non-slip bottoms and are in good condition.   · Wear shoes both inside and out. Avoid going barefoot or wearing slippers.  · Be cautious when going up and down stairs, curbs, and when walking on uneven sidewalks.  · If your balance is poor, consider using a cane or walker.  · If your fall was related to alcohol use, stop or limit alcohol intake.   · If your fall was related to use of sleeping medicines, talk to your doctor about this. You may need to reduce your dosage at bedtime if you awaken during the night to go to the bathroom.    · To reduce the need for nighttime bathroom trips:  ¨ Avoid drinking fluids for several hours before going to bed  ¨ Empty your bladder before going to bed  ¨ Men can keep a urinal at the bedside  · Stay as active as you can. Balance, flexibility, strength, and endurance all come from exercise. They all play a role in preventing falls. Ask your healthcare provider which types of activity are right for you.  · Get your vision checked on a regular basis.  · If  you have pets, know where they are before you stand up or walk so you don't trip over them.  · Use night lights.

## 2017-07-06 NOTE — ANESTHESIA PREPROCEDURE EVALUATION
07/06/2017  Francoise De Jesus is a 71 y.o., female.    Anesthesia Evaluation    I have reviewed the Patient Summary Reports.     I have reviewed the Medications.     Review of Systems  Anesthesia Hx:  No problems with previous Anesthesia Denies Hx of Anesthetic complications  History of prior surgery of interest to airway management or planning: Denies Family Hx of Anesthesia complications.   Denies Personal Hx of Anesthesia complications.   Social:  Non-Smoker, No Alcohol Use    Hematology/Oncology:  Hematology Normal       -- Cancer in past history:  Breast right surgery    EENT/Dental:EENT/Dental Normal   Cardiovascular:   Exercise tolerance: good Hypertension hyperlipidemia    Pulmonary:  Pulmonary Normal    Renal/:   Chronic Renal Disease, CRI    Hepatic/GI:  Hepatic/GI Normal    Musculoskeletal:   Arthritis     Neurological:  Neurology Normal    Endocrine:  Endocrine Normal    Psych:  Psychiatric Normal           Physical Exam  General:  Morbid Obesity    Airway/Jaw/Neck:  Airway Findings: Mouth Opening: Normal Tongue: Normal  General Airway Assessment: Adult, Average  Mallampati: III  TM Distance: Normal, at least 6 cm  Jaw/Neck Findings:  Neck ROM: Normal ROM      Dental:  Dental Findings: In tact, Periodontal disease, Severe   Chest/Lungs:  Chest/Lungs Findings: Normal Respiratory Rate, Decreased Breath Sounds Bilateral, Clear to auscultation     Heart/Vascular:  Heart Findings: Rate: Normal  Rhythm: Regular Rhythm        Mental Status:  Mental Status Findings:  Alert and Oriented, Cooperative         Anesthesia Plan  Type of Anesthesia, risks & benefits discussed:  Anesthesia Type:  MAC  Patient's Preference:   Intra-op Monitoring Plan: standard ASA monitors  Intra-op Monitoring Plan Comments:   Post Op Pain Control Plan:   Post Op Pain Control Plan Comments:   Induction:    Beta Blocker:  Patient is  not currently on a Beta-Blocker (No further documentation required).       Informed Consent: Patient understands risks and agrees with Anesthesia plan.  Questions answered. Anesthesia consent signed with patient.  ASA Score: 3     Day of Surgery Review of History & Physical:    H&P update referred to the surgeon.         Ready For Surgery From Anesthesia Perspective.

## 2017-07-07 ENCOUNTER — OFFICE VISIT (OUTPATIENT)
Dept: OPHTHALMOLOGY | Facility: CLINIC | Age: 72
End: 2017-07-07
Payer: MEDICARE

## 2017-07-07 VITALS — HEART RATE: 99 BPM | SYSTOLIC BLOOD PRESSURE: 152 MMHG | DIASTOLIC BLOOD PRESSURE: 76 MMHG

## 2017-07-07 DIAGNOSIS — Z98.890 POST-OPERATIVE STATE: Primary | ICD-10-CM

## 2017-07-07 PROCEDURE — 99024 POSTOP FOLLOW-UP VISIT: CPT | Mod: ,,, | Performed by: OPHTHALMOLOGY

## 2017-07-07 PROCEDURE — 99213 OFFICE O/P EST LOW 20 MIN: CPT | Mod: PBBFAC,PO | Performed by: OPHTHALMOLOGY

## 2017-07-07 PROCEDURE — 99999 PR PBB SHADOW E&M-EST. PATIENT-LVL III: CPT | Mod: PBBFAC,,, | Performed by: OPHTHALMOLOGY

## 2017-07-07 NOTE — PROGRESS NOTES
Subjective:       Patient ID: Francoise De Jesus is a 71 y.o. female.    Chief Complaint: Post-op Evaluation (1 day po od)    HPI  Review of Systems    Objective:      Physical Exam    Assessment:       1. Post-operative state        Plan:       S/p CE OU- Doing well.        CPM OD.  Taper gtts OS.  RTC 1 wk.

## 2017-07-14 ENCOUNTER — TELEPHONE (OUTPATIENT)
Dept: OPHTHALMOLOGY | Facility: CLINIC | Age: 72
End: 2017-07-14

## 2017-07-14 ENCOUNTER — OFFICE VISIT (OUTPATIENT)
Dept: OPHTHALMOLOGY | Facility: CLINIC | Age: 72
End: 2017-07-14
Payer: MEDICARE

## 2017-07-14 DIAGNOSIS — Z98.890 POST-OPERATIVE STATE: Primary | ICD-10-CM

## 2017-07-14 PROCEDURE — 99212 OFFICE O/P EST SF 10 MIN: CPT | Mod: PBBFAC,PO | Performed by: OPHTHALMOLOGY

## 2017-07-14 PROCEDURE — 99024 POSTOP FOLLOW-UP VISIT: CPT | Mod: ,,, | Performed by: OPHTHALMOLOGY

## 2017-07-14 PROCEDURE — 99999 PR PBB SHADOW E&M-EST. PATIENT-LVL II: CPT | Mod: PBBFAC,,, | Performed by: OPHTHALMOLOGY

## 2017-07-14 NOTE — PROGRESS NOTES
Subjective:       Patient ID: Francoise De Jesus is a 71 y.o. female.    Chief Complaint: Post-op Evaluation (1 week PO OD)    HPI  Review of Systems    Objective:      Physical Exam    Assessment:       1. Post-operative state        Plan:       S/p CE OU- Doing well.        Taper gtts OU.  RTC 3 wks.

## 2017-07-18 ENCOUNTER — OFFICE VISIT (OUTPATIENT)
Dept: HEMATOLOGY/ONCOLOGY | Facility: CLINIC | Age: 72
End: 2017-07-18
Payer: MEDICARE

## 2017-07-18 VITALS
BODY MASS INDEX: 44.87 KG/M2 | HEART RATE: 95 BPM | DIASTOLIC BLOOD PRESSURE: 65 MMHG | RESPIRATION RATE: 20 BRPM | WEIGHT: 237.63 LBS | HEIGHT: 61 IN | SYSTOLIC BLOOD PRESSURE: 147 MMHG | TEMPERATURE: 98 F

## 2017-07-18 DIAGNOSIS — E66.01 MORBID OBESITY WITH BMI OF 40.0-44.9, ADULT: ICD-10-CM

## 2017-07-18 DIAGNOSIS — C50.911 BREAST CANCER, STAGE 1, RIGHT: Primary | ICD-10-CM

## 2017-07-18 DIAGNOSIS — Z79.811 USE OF AROMATASE INHIBITORS: ICD-10-CM

## 2017-07-18 PROCEDURE — 99213 OFFICE O/P EST LOW 20 MIN: CPT | Mod: PBBFAC | Performed by: INTERNAL MEDICINE

## 2017-07-18 PROCEDURE — 1126F AMNT PAIN NOTED NONE PRSNT: CPT | Mod: ,,, | Performed by: INTERNAL MEDICINE

## 2017-07-18 PROCEDURE — 99213 OFFICE O/P EST LOW 20 MIN: CPT | Mod: S$PBB,,, | Performed by: INTERNAL MEDICINE

## 2017-07-18 PROCEDURE — 99999 PR PBB SHADOW E&M-EST. PATIENT-LVL III: CPT | Mod: PBBFAC,,, | Performed by: INTERNAL MEDICINE

## 2017-07-18 PROCEDURE — 1159F MED LIST DOCD IN RCRD: CPT | Mod: ,,, | Performed by: INTERNAL MEDICINE

## 2017-07-18 RX ORDER — ANASTROZOLE 1 MG/1
1 TABLET ORAL DAILY
Qty: 90 TABLET | Refills: 2 | Status: SHIPPED | OUTPATIENT
Start: 2017-07-18 | End: 2017-11-21 | Stop reason: ALTCHOICE

## 2017-07-18 NOTE — Clinical Note
Please call her and tell ehr that she started anastrazole in April 2012, not 2013.  She has therefore completed her 5 years and she may get off of it....

## 2017-07-18 NOTE — TELEPHONE ENCOUNTER
Called patient and did inform her that she will no longer take  Anastrozole .  She has been on in since 2012

## 2017-07-18 NOTE — PROGRESS NOTES
Subjective:       Patient ID: Francoise De Jesus is a 71 y.o. female.    Chief Complaint: No chief complaint on file.    HPI   Mrs. De Jesus returns today for followup for her adjuvant treatment of breast cancer.  Briefly, she is a 71-year-old  female who underwent a right lumpectomy for a 1.3 cm lobular carcinoma that was ER strongly positive, WV positive, and HER-2 negative. The Oncotype DX score was 10, which indicated that she had a 7% chance of recurrence within 10 years with tamoxifen alone.   Mrs. De Jesus received adjuvant radiation therapy and as of early April 2012, she has been on anastrozole which she has tolerated well.   A mammogram last October was read as BIRADS II, and a one year follow up was recommended.    Review of Systems  Overall she feels well and she has no complaints.  ECOG PS remains 1.  She does not experience any bone pain or stiffness, and she denies any depression, easy bruising, fevers, chills, night sweats, weight loss, nausea, vomiting, diarrhea, constipation, diplopia, blurred vision, shortness of breath, palpitations, or headaches.     Objective:      Physical Exam  GENERAL: She is alert, oriented to time, place, person, pleasant, well nourished, in no acute physical distress.   VITAL SIGNS: Reviewed.   HEENT: Normal. There are no nasal, oral, lip, gingival, auricular, lid, or conjunctival lesions. Mucosae are moist and pink, and there is no   thrush. Pupils are equal, reactive to light and accommodation. Extraocular muscle movements are intact.   NECK: Supple without JVD, adenopathy, or thyromegaly.   LUNGS: Clear to auscultation without wheezing, rales, or rhonchi.   CARDIOVASCULAR: Reveals an S1, S2, a grade I TAMIKO at the left sternal border, no rubs, and no gallops.   BREASTS: She is status post bilateral lumpectomies with well-healed incisions.   There are no masses in either breast.   Mild intertrigo of the right inframammary fold is again identified.  ABDOMEN: Morbidly obese,  soft, nontender, without organomegaly.  A low median abdominal scar from her  is identified.  Bowel sounds are identified.   EXTREMITIES: No cyanosis or clubbing. There is no lower extremity edema.   NEUROLOGIC: DTRs are 0-1+ bilaterally, symmetrical, motor function is 5/5,   and cranial nerves within normal limits.  Assessment:       1. Breast cancer, stage 1, right    2. Use of aromatase inhibitors    3. Morbid obesity with BMI of 40.0-44.9, adult        Plan:        She has now completed 5 years of adjuvant hormonal therapy.  She may discontinue the anastrazole, and see me again in 4 months with a mammogram.  Her questions were answered to her satisfaction.

## 2017-07-21 ENCOUNTER — TELEPHONE (OUTPATIENT)
Dept: HEMATOLOGY/ONCOLOGY | Facility: CLINIC | Age: 72
End: 2017-07-21

## 2017-08-07 ENCOUNTER — OFFICE VISIT (OUTPATIENT)
Dept: OPHTHALMOLOGY | Facility: CLINIC | Age: 72
End: 2017-08-07
Payer: MEDICARE

## 2017-08-07 DIAGNOSIS — H52.7 REFRACTIVE ERROR: ICD-10-CM

## 2017-08-07 DIAGNOSIS — H35.3130 AGE-RELATED MACULAR DEGENERATION, DRY, BOTH EYES: ICD-10-CM

## 2017-08-07 DIAGNOSIS — Z98.890 POST-OPERATIVE STATE: Primary | ICD-10-CM

## 2017-08-07 PROCEDURE — 99024 POSTOP FOLLOW-UP VISIT: CPT | Mod: ,,, | Performed by: OPHTHALMOLOGY

## 2017-08-07 PROCEDURE — 99212 OFFICE O/P EST SF 10 MIN: CPT | Mod: PBBFAC,PO | Performed by: OPHTHALMOLOGY

## 2017-08-07 PROCEDURE — 99999 PR PBB SHADOW E&M-EST. PATIENT-LVL II: CPT | Mod: PBBFAC,,, | Performed by: OPHTHALMOLOGY

## 2017-08-07 NOTE — PROGRESS NOTES
Subjective:       Patient ID: Francoise De Jesus is a 71 y.o. female.    Chief Complaint: Post-op Evaluation (3 week PO OU )    HPI  Review of Systems    Objective:      Physical Exam    Assessment:       1. Post-operative state    2. Age-related macular degeneration, dry, both eyes    3. Refractive error        Plan:       S/p CE OU- Doing well.  Dry AMD OU-Mild, stable.  RE-Pt wants MRx.          AREDS/AG.  Give MRx.  RTC Dr Agarwal in 6 mos.

## 2017-10-02 ENCOUNTER — CLINICAL SUPPORT (OUTPATIENT)
Dept: FAMILY MEDICINE | Facility: CLINIC | Age: 72
End: 2017-10-02
Payer: MEDICARE

## 2017-10-02 DIAGNOSIS — Z23 NEED FOR INFLUENZA VACCINATION: Primary | ICD-10-CM

## 2017-10-02 PROCEDURE — 99499 UNLISTED E&M SERVICE: CPT | Mod: S$PBB,,, | Performed by: INTERNAL MEDICINE

## 2017-10-02 PROCEDURE — G0008 ADMIN INFLUENZA VIRUS VAC: HCPCS | Mod: PBBFAC,PO | Performed by: INTERNAL MEDICINE

## 2017-11-05 ENCOUNTER — HOSPITAL ENCOUNTER (EMERGENCY)
Facility: OTHER | Age: 72
Discharge: HOME | End: 2017-11-05
Attending: EMERGENCY MEDICINE
Payer: MEDICARE

## 2017-11-05 VITALS
OXYGEN SATURATION: 98 % | BODY MASS INDEX: 45.16 KG/M2 | HEART RATE: 98 BPM | DIASTOLIC BLOOD PRESSURE: 70 MMHG | RESPIRATION RATE: 16 BRPM | HEIGHT: 60 IN | WEIGHT: 230 LBS | SYSTOLIC BLOOD PRESSURE: 120 MMHG | TEMPERATURE: 100 F

## 2017-11-05 DIAGNOSIS — J10.1 INFLUENZA A: Primary | ICD-10-CM

## 2017-11-05 LAB
CTP QC/QA: YES
FLUAV AG NPH QL: POSITIVE
FLUBV AG NPH QL: NEGATIVE

## 2017-11-05 PROCEDURE — 87804 INFLUENZA ASSAY W/OPTIC: CPT

## 2017-11-05 PROCEDURE — 25000003 PHARM REV CODE 250: Performed by: EMERGENCY MEDICINE

## 2017-11-05 PROCEDURE — 99284 EMERGENCY DEPT VISIT MOD MDM: CPT

## 2017-11-05 RX ORDER — IBUPROFEN 400 MG/1
800 TABLET ORAL
Status: COMPLETED | OUTPATIENT
Start: 2017-11-05 | End: 2017-11-05

## 2017-11-05 RX ORDER — OSELTAMIVIR PHOSPHATE 75 MG/1
75 CAPSULE ORAL
Status: COMPLETED | OUTPATIENT
Start: 2017-11-05 | End: 2017-11-05

## 2017-11-05 RX ORDER — OSELTAMIVIR PHOSPHATE 75 MG/1
75 CAPSULE ORAL 2 TIMES DAILY
Qty: 10 CAPSULE | Refills: 0 | Status: SHIPPED | OUTPATIENT
Start: 2017-11-05 | End: 2017-11-10

## 2017-11-05 RX ORDER — BUTALBITAL, ACETAMINOPHEN AND CAFFEINE 50; 325; 40 MG/1; MG/1; MG/1
1 TABLET ORAL
Status: COMPLETED | OUTPATIENT
Start: 2017-11-05 | End: 2017-11-05

## 2017-11-05 RX ORDER — IBUPROFEN 800 MG/1
800 TABLET ORAL EVERY 6 HOURS PRN
Qty: 20 TABLET | Refills: 0 | Status: SHIPPED | OUTPATIENT
Start: 2017-11-05 | End: 2022-07-06

## 2017-11-05 RX ORDER — BENZONATATE 100 MG/1
100 CAPSULE ORAL 3 TIMES DAILY PRN
Qty: 20 CAPSULE | Refills: 0 | Status: SHIPPED | OUTPATIENT
Start: 2017-11-05 | End: 2017-11-15

## 2017-11-05 RX ADMIN — IBUPROFEN 800 MG: 400 TABLET, FILM COATED ORAL at 08:11

## 2017-11-05 RX ADMIN — OSELTAMIVIR PHOSPHATE 75 MG: 75 CAPSULE ORAL at 08:11

## 2017-11-05 RX ADMIN — BUTALBITAL, ACETAMINOPHEN, AND CAFFEINE 1 TABLET: 50; 325; 40 TABLET ORAL at 08:11

## 2017-11-05 NOTE — ED PROVIDER NOTES
Encounter Date: 11/5/2017       History     Chief Complaint   Patient presents with    Headache    Fever     Chief complaint: Fever  72-year-old began having fever, bitemporal headache, sore throat, nasal congestion and a  nonproductive cough yesterday.  Patient's brother was diagnosed with influenza A and  she did have contact with him.  She did taken Tylenol with improvement. no chest pain.  No shortness of breath.  No urinary symptoms.  She denies body aches       The history is provided by the patient and the spouse.     Review of patient's allergies indicates:   Allergen Reactions    Medrol [methylprednisolone] Other (See Comments)     Depression/tearful    Penicillins      Other reaction(s): Rash     Past Medical History:   Diagnosis Date    Arthritis     Atherosclerosis of aorta     noted on CXR 11/17/2014    Breast cancer 2011    stage I right breast cancer    History of peptic ulcer     Hyperlipidemia     Hypertension     Macular degeneration 3/13/2017    Morbid obesity     Nuclear sclerosis of both eyes 4/21/2017    Prediabetes      Past Surgical History:   Procedure Laterality Date    BREAST LUMPECTOMY Right 11/2011    right lumpectomy and SN biopsy    CATARACT EXTRACTION Left 07/22/2017    Dr. Okeefe    CATARACT EXTRACTION W/  INTRAOCULAR LENS IMPLANT Right 07/06/2017    Dr. Okeefe    CHOLECYSTECTOMY      EYE SURGERY      Lt cataract     Family History   Problem Relation Age of Onset    Stomach cancer Mother     Lung cancer Father     Hypertension Father     Breast cancer Sister 58    Cataracts Sister     Macular degeneration Sister     Macular degeneration Brother      wet    Cataracts Sister     Macular degeneration Sister     Breast cancer Sister 72    Macular degeneration Sister     Macular degeneration Sister     Macular degeneration Brother      dry    Hypertension Brother     Diabetes Brother     Hypertension Brother     Prostate cancer Brother      Hypertension Brother     Hyperlipidemia Brother     No Known Problems Maternal Grandmother     No Known Problems Maternal Grandfather     No Known Problems Paternal Grandmother     No Known Problems Paternal Grandfather     Ovarian cancer Neg Hx     Amblyopia Neg Hx     Blindness Neg Hx     Glaucoma Neg Hx     Retinal detachment Neg Hx     Strabismus Neg Hx     Stroke Neg Hx     Thyroid disease Neg Hx      Social History   Substance Use Topics    Smoking status: Never Smoker    Smokeless tobacco: Never Used    Alcohol use No     Review of Systems   Constitutional: Positive for chills and fever.   HENT: Positive for congestion and sore throat.    Eyes: Negative for photophobia.   Respiratory: Positive for cough. Negative for shortness of breath.    Cardiovascular: Negative for chest pain.   Gastrointestinal: Negative for nausea.   Genitourinary: Negative for dysuria.   Musculoskeletal: Negative for back pain.   Skin: Negative for rash.   Neurological: Negative for weakness.       Physical Exam     Initial Vitals [11/05/17 0759]   BP Pulse Resp Temp SpO2   123/72 109 16 99 °F (37.2 °C) 98 %      MAP       89         Physical Exam    Nursing note and vitals reviewed.  Constitutional: She appears well-developed and well-nourished.   HENT:   Head: Normocephalic and atraumatic.   Right Ear: Tympanic membrane normal.   Left Ear: Tympanic membrane normal.   Nose: No mucosal edema.   Mouth/Throat: No oropharyngeal exudate, posterior oropharyngeal edema or posterior oropharyngeal erythema.   Eyes: Conjunctivae and EOM are normal. Pupils are equal, round, and reactive to light.   Neck: Normal range of motion. Neck supple.   Cardiovascular: Normal rate and regular rhythm.   Pulmonary/Chest: Breath sounds normal.   Abdominal: Soft. There is no tenderness. There is no rebound and no guarding.   Musculoskeletal: Normal range of motion.   Neurological: She is alert and oriented to person, place, and time. She has  normal strength.   Skin: Skin is warm and dry.   Psychiatric: She has a normal mood and affect.         ED Course   Procedures  Labs Reviewed   POCT INFLUENZA A/B             Medical Decision Making:   Initial Assessment:   72-year-old presents with flulike symptoms.  On exam patient is nontoxic appearing.  She appears well.  Lungs are clear.  She is afebrile  ED Management:  Influenza A is positive.  Patient will begin treatment with Tamiflu.  She was treated with Fioricet and ibuprofen in the ED as well.  She was given strict return precautions.                   ED Course      Clinical Impression:   The encounter diagnosis was Influenza A.                           Fiorella Olivarez MD  11/05/17 0826       Fiorella Olivarez MD  11/05/17 0839

## 2017-11-05 NOTE — ED TRIAGE NOTES
"Presents ambulatory chief c/o " I have a headache and a fever". Onset sx yesterday. Reports fever of 101. Last po tylenol 2100   "

## 2017-11-07 ENCOUNTER — TELEPHONE (OUTPATIENT)
Dept: FAMILY MEDICINE | Facility: CLINIC | Age: 72
End: 2017-11-07

## 2017-11-21 ENCOUNTER — OFFICE VISIT (OUTPATIENT)
Dept: FAMILY MEDICINE | Facility: CLINIC | Age: 72
End: 2017-11-21
Payer: MEDICARE

## 2017-11-21 VITALS
SYSTOLIC BLOOD PRESSURE: 122 MMHG | HEIGHT: 60 IN | TEMPERATURE: 98 F | WEIGHT: 241.19 LBS | BODY MASS INDEX: 47.35 KG/M2 | DIASTOLIC BLOOD PRESSURE: 60 MMHG | HEART RATE: 82 BPM | OXYGEN SATURATION: 97 %

## 2017-11-21 DIAGNOSIS — N18.30 BENIGN HYPERTENSION WITH CHRONIC KIDNEY DISEASE, STAGE III: ICD-10-CM

## 2017-11-21 DIAGNOSIS — N18.30 CKD (CHRONIC KIDNEY DISEASE) STAGE 3, GFR 30-59 ML/MIN: ICD-10-CM

## 2017-11-21 DIAGNOSIS — E66.01 MORBID OBESITY WITH BMI OF 40.0-44.9, ADULT: ICD-10-CM

## 2017-11-21 DIAGNOSIS — I12.9 BENIGN HYPERTENSION WITH CHRONIC KIDNEY DISEASE, STAGE III: ICD-10-CM

## 2017-11-21 DIAGNOSIS — E78.5 HYPERLIPIDEMIA, UNSPECIFIED HYPERLIPIDEMIA TYPE: ICD-10-CM

## 2017-11-21 DIAGNOSIS — R73.03 PREDIABETES: ICD-10-CM

## 2017-11-21 DIAGNOSIS — Z85.3 HISTORY OF BREAST CANCER: ICD-10-CM

## 2017-11-21 DIAGNOSIS — I70.0 ATHEROSCLEROSIS OF AORTA: Primary | ICD-10-CM

## 2017-11-21 PROCEDURE — 99214 OFFICE O/P EST MOD 30 MIN: CPT | Mod: S$PBB,,, | Performed by: NURSE PRACTITIONER

## 2017-11-21 PROCEDURE — 99999 PR PBB SHADOW E&M-EST. PATIENT-LVL III: CPT | Mod: PBBFAC,,, | Performed by: NURSE PRACTITIONER

## 2017-11-21 PROCEDURE — 99213 OFFICE O/P EST LOW 20 MIN: CPT | Mod: PBBFAC,PO | Performed by: NURSE PRACTITIONER

## 2017-11-21 RX ORDER — FENOFIBRATE 145 MG/1
145 TABLET, FILM COATED ORAL DAILY
Qty: 90 TABLET | Refills: 1 | Status: SHIPPED | OUTPATIENT
Start: 2017-11-21 | End: 2018-04-23 | Stop reason: SDUPTHER

## 2017-11-21 RX ORDER — LISINOPRIL 40 MG/1
40 TABLET ORAL DAILY
Qty: 90 TABLET | Refills: 1 | Status: SHIPPED | OUTPATIENT
Start: 2017-11-21 | End: 2018-04-23 | Stop reason: SDUPTHER

## 2017-11-21 RX ORDER — HYDROCHLOROTHIAZIDE 25 MG/1
25 TABLET ORAL DAILY
Qty: 90 TABLET | Refills: 1 | Status: SHIPPED | OUTPATIENT
Start: 2017-11-21 | End: 2018-04-23 | Stop reason: SDUPTHER

## 2017-11-21 RX ORDER — PRAVASTATIN SODIUM 10 MG/1
10 TABLET ORAL DAILY
Qty: 90 TABLET | Refills: 1 | Status: SHIPPED | OUTPATIENT
Start: 2017-11-21 | End: 2018-04-23 | Stop reason: SDUPTHER

## 2017-11-24 NOTE — PROGRESS NOTES
Subjective:       Patient ID: Francoise De Jesus is a 72 y.o. female.    Chief Complaint: No chief complaint on file.    HPI   Mrs. De Jesus returns today for followup for her adjuvant treatment of breast cancer.  Briefly, she is a 71-year-old  female who underwent a right lumpectomy for a 1.3 cm lobular carcinoma that was ER strongly positive, WI positive, and HER-2 negative. The Oncotype DX score was 10, which indicated that she had a 7% chance of recurrence within 10 years with tamoxifen alone.   Mrs. De Jesus received adjuvant radiation therapy and as of early April 2012, she has been on anastrozole which she has tolerated well.   A mammogram last October was read as BIRADS II, and a one year follow up was recommended.    Review of Systems  Overall she feels well and she has no complaints.  ECOG PS remains 1.  She does not experience any bone pain or stiffness, and she denies any depression, easy bruising, fevers, chills, night sweats, weight loss, nausea, vomiting, diarrhea, constipation, diplopia, blurred vision, shortness of breath, palpitations, or headaches.     Objective:      Physical Exam  GENERAL: She is alert, oriented to time, place, person, pleasant, well nourished, in no acute physical distress.   VITAL SIGNS: Reviewed.   HEENT: Normal. There are no nasal, oral, lip, gingival, auricular, lid, or conjunctival lesions. Mucosae are moist and pink, and there is no   thrush. Pupils are equal, reactive to light and accommodation. Extraocular muscle movements are intact.   NECK: Supple without JVD, adenopathy, or thyromegaly.   LUNGS: Clear to auscultation without wheezing, rales, or rhonchi.   CARDIOVASCULAR: Reveals an S1, S2, a grade I TAMIKO at the left sternal border, no rubs, and no gallops.   BREASTS: She is status post bilateral lumpectomies with well-healed incisions.   There are no masses in either breast.   Mild intertrigo of the right inframammary fold is again identified.  ABDOMEN: Morbidly obese,  soft, nontender, without organomegaly.  A low median abdominal scar from her  is identified.  Bowel sounds are identified.   EXTREMITIES: No cyanosis or clubbing. There is no lower extremity edema.   NEUROLOGIC: DTRs are 0-1+ bilaterally, symmetrical, motor function is 5/5,   and cranial nerves within normal limits.  Assessment:       1. Malignant neoplasm of left breast, stage 1, estrogen receptor positive    2. Morbid obesity with BMI of 40.0-44.9, adult    3. Use of aromatase inhibitors        Plan:        Mrs De Jesus is doing well, and remains JEREMIAS.  She will see me again in 12 months with a mammogram.  Her questions were answered to her satisfaction.

## 2017-11-27 ENCOUNTER — HOSPITAL ENCOUNTER (OUTPATIENT)
Dept: RADIOLOGY | Facility: HOSPITAL | Age: 72
Discharge: HOME | End: 2017-11-27
Attending: INTERNAL MEDICINE
Payer: MEDICARE

## 2017-11-27 ENCOUNTER — OFFICE VISIT (OUTPATIENT)
Dept: HEMATOLOGY/ONCOLOGY | Facility: CLINIC | Age: 72
End: 2017-11-27
Payer: MEDICARE

## 2017-11-27 VITALS — HEIGHT: 60 IN | BODY MASS INDEX: 47.32 KG/M2 | WEIGHT: 241 LBS

## 2017-11-27 VITALS
WEIGHT: 235.88 LBS | SYSTOLIC BLOOD PRESSURE: 146 MMHG | DIASTOLIC BLOOD PRESSURE: 69 MMHG | HEART RATE: 83 BPM | TEMPERATURE: 98 F | BODY MASS INDEX: 46.07 KG/M2

## 2017-11-27 DIAGNOSIS — Z79.811 USE OF AROMATASE INHIBITORS: ICD-10-CM

## 2017-11-27 DIAGNOSIS — E66.01 MORBID OBESITY WITH BMI OF 40.0-44.9, ADULT: ICD-10-CM

## 2017-11-27 DIAGNOSIS — C50.911 BREAST CANCER, STAGE 1, RIGHT: ICD-10-CM

## 2017-11-27 DIAGNOSIS — C50.912 MALIGNANT NEOPLASM OF LEFT BREAST, STAGE 1, ESTROGEN RECEPTOR POSITIVE: Primary | ICD-10-CM

## 2017-11-27 DIAGNOSIS — Z17.0 MALIGNANT NEOPLASM OF LEFT BREAST, STAGE 1, ESTROGEN RECEPTOR POSITIVE: Primary | ICD-10-CM

## 2017-11-27 PROCEDURE — 99213 OFFICE O/P EST LOW 20 MIN: CPT | Mod: S$PBB,,, | Performed by: INTERNAL MEDICINE

## 2017-11-27 PROCEDURE — 99213 OFFICE O/P EST LOW 20 MIN: CPT | Mod: PBBFAC | Performed by: INTERNAL MEDICINE

## 2017-11-27 PROCEDURE — 77066 DX MAMMO INCL CAD BI: CPT | Mod: 26,,, | Performed by: RADIOLOGY

## 2017-11-27 PROCEDURE — 77062 BREAST TOMOSYNTHESIS BI: CPT | Mod: 26,,, | Performed by: RADIOLOGY

## 2017-11-27 PROCEDURE — 99999 PR PBB SHADOW E&M-EST. PATIENT-LVL III: CPT | Mod: PBBFAC,,, | Performed by: INTERNAL MEDICINE

## 2017-11-27 PROCEDURE — 77066 DX MAMMO INCL CAD BI: CPT | Mod: TC,PO

## 2018-01-29 ENCOUNTER — OFFICE VISIT (OUTPATIENT)
Dept: URGENT CARE | Facility: CLINIC | Age: 73
End: 2018-01-29
Payer: MEDICARE

## 2018-01-29 VITALS
TEMPERATURE: 99 F | DIASTOLIC BLOOD PRESSURE: 82 MMHG | HEART RATE: 93 BPM | WEIGHT: 240 LBS | SYSTOLIC BLOOD PRESSURE: 139 MMHG | RESPIRATION RATE: 19 BRPM | OXYGEN SATURATION: 98 % | BODY MASS INDEX: 45.31 KG/M2 | HEIGHT: 61 IN

## 2018-01-29 DIAGNOSIS — R50.9 FEVER, UNSPECIFIED FEVER CAUSE: ICD-10-CM

## 2018-01-29 DIAGNOSIS — R05.9 COUGH: ICD-10-CM

## 2018-01-29 DIAGNOSIS — J01.00 ACUTE MAXILLARY SINUSITIS, RECURRENCE NOT SPECIFIED: Primary | ICD-10-CM

## 2018-01-29 LAB
CTP QC/QA: YES
FLUAV AG NPH QL: NEGATIVE
FLUBV AG NPH QL: NEGATIVE

## 2018-01-29 PROCEDURE — 87804 INFLUENZA ASSAY W/OPTIC: CPT | Mod: 59,QW,S$GLB, | Performed by: PHYSICIAN ASSISTANT

## 2018-01-29 PROCEDURE — 99214 OFFICE O/P EST MOD 30 MIN: CPT | Mod: S$GLB,,, | Performed by: PHYSICIAN ASSISTANT

## 2018-01-29 RX ORDER — PROMETHAZINE HYDROCHLORIDE AND DEXTROMETHORPHAN HYDROBROMIDE 6.25; 15 MG/5ML; MG/5ML
5 SYRUP ORAL
Qty: 118 ML | Refills: 0 | Status: SHIPPED | OUTPATIENT
Start: 2018-01-29 | End: 2018-02-05

## 2018-01-29 RX ORDER — CEFDINIR 300 MG/1
300 CAPSULE ORAL 2 TIMES DAILY
Qty: 20 CAPSULE | Refills: 0 | Status: SHIPPED | OUTPATIENT
Start: 2018-01-29 | End: 2018-02-08

## 2018-01-29 RX ORDER — FLUTICASONE PROPIONATE 50 MCG
1 SPRAY, SUSPENSION (ML) NASAL DAILY
Qty: 16 G | Refills: 0 | Status: SHIPPED | OUTPATIENT
Start: 2018-01-29 | End: 2019-01-29

## 2018-01-29 NOTE — PROGRESS NOTES
I have reviewed the notes, assessments, and/or procedures performed by Reginald Mcadams PA-C, I concur with his documentation of Francoise De Jesus.

## 2018-01-29 NOTE — PATIENT INSTRUCTIONS
Please return here or go to the Emergency Department for any concerns or worsening of condition.  If you were prescribed antibiotics, please take them to completion.  If you were prescribed a narcotic medication, do not drive or operate heavy equipment or machinery while taking these medications.  Please follow up with your primary care doctor or specialist as needed.    If you  smoke, please stop smoking.    Symptomatic treatment:    Tylenol every 4 hours  salt water gargles  Cold-eeze helps to reduce the duration of sore throat symptoms  Cepachol helps to numb the discomfort  Chloroseptic spray  Nasal saline spray reduces inflammation and dryness  Warm face compresses as often as you can  Vicks vapor rub at night  Flonase OTC or Nasacort OTC  Simple foods like chicken noodle soup help  Mucinex DM or use Coricidin HBP if you have hypertension  Zyrtec/Claritin/Xyzal during the day and Benadryl at night may help if allergy component   Pedialyte helps with dehydration if lacking appetite  Rest as much as you can      Sinusitis (Antibiotic Treatment)    The sinuses are air-filled spaces within the bones of the face. They connect to the inside of the nose. Sinusitis is an inflammation of the tissue lining the sinus cavity. Sinus inflammation can occur during a cold. It can also be due to allergies to pollens and other particles in the air. Sinusitis can cause symptoms of sinus congestion and fullness. A sinus infection causes fever, headache and facial pain. There is often green or yellow drainage from the nose or into the back of the throat (post-nasal drip). You have been given antibiotics to treat this condition.  Home care:  · Take the full course of antibiotics as instructed. Do not stop taking them, even if you feel better.  · Drink plenty of water, hot tea, and other liquids. This may help thin mucus. It also may promote sinus drainage.  · Heat may help soothe painful areas of the face. Use a towel soaked in hot  water. Or,  the shower and direct the hot spray onto your face. Using a vaporizer along with a menthol rub at night may also help.   · An expectorant containing guaifenesin may help thin the mucus and promote drainage from the sinuses.  · Over-the-counter decongestants may be used unless a similar medicine was prescribed. Nasal sprays work the fastest. Use one that contains phenylephrine or oxymetazoline. First blow the nose gently. Then use the spray. Do not use these medicines more often than directed on the label or symptoms may get worse. You may also use tablets containing pseudoephedrine. Avoid products that combine ingredients, because side effects may be increased. Read labels. You can also ask the pharmacist for help. (NOTE: Persons with high blood pressure should not use decongestants. They can raise blood pressure.)  · Over-the-counter antihistamines may help if allergies contributed to your sinusitis.    · Do not use nasal rinses or irrigation during an acute sinus infection, unless told to by your health care provider. Rinsing may spread the infection to other sinuses.  · Use acetaminophen or ibuprofen to control pain, unless another pain medicine was prescribed. (If you have chronic liver or kidney disease or ever had a stomach ulcer, talk with your doctor before using these medicines. Aspirin should never be used in anyone under 18 years of age who is ill with a fever. It may cause severe liver damage.)  · Don't smoke. This can worsen symptoms.  Follow-up care  Follow up with your healthcare provider or our staff if you are not improving within the next week.  When to seek medical advice  Call your healthcare provider if any of these occur:  · Facial pain or headache becoming more severe  · Stiff neck  · Unusual drowsiness or confusion  · Swelling of the forehead or eyelids  · Vision problems, including blurred or double vision  · Fever of 100.4ºF (38ºC) or higher, or as directed by your  healthcare provider  · Seizure  · Breathing problems  · Symptoms not resolving within 10 days  Date Last Reviewed: 4/13/2015  © 2448-5981 [employer]. [address] [place] [address], [place] [address]. All rights reserved. This information is not intended as a substitute for professional medical care. Always follow your healthcare professional's instructions.

## 2018-01-29 NOTE — PROGRESS NOTES
"Subjective:       Patient ID: Francoise De Jesus is a 72 y.o. female.    Vitals:  height is 5' 1" (1.549 m) and weight is 108.9 kg (240 lb). Her temperature is 99.3 °F (37.4 °C). Her blood pressure is 139/82 and her pulse is 93. Her respiration is 19 and oxygen saturation is 98%.     Chief Complaint: Fever    Pt reports being positive for flu a and b in November.      Fever    This is a new problem. The current episode started in the past 7 days. The problem occurs constantly. The problem has been gradually worsening. The maximum temperature noted was 100 to 100.9 F. The temperature was taken using an oral thermometer. Associated symptoms include congestion, coughing, ear pain and headaches. Pertinent negatives include no abdominal pain, chest pain, nausea, sore throat or wheezing. She has tried acetaminophen for the symptoms. The treatment provided mild relief.     Review of Systems   Constitution: Positive for fever and malaise/fatigue. Negative for chills.   HENT: Positive for congestion, ear pain and hoarse voice. Negative for sore throat.    Eyes: Negative for discharge and redness.   Cardiovascular: Negative for chest pain, dyspnea on exertion and leg swelling.   Respiratory: Positive for cough. Negative for shortness of breath, sputum production and wheezing.    Musculoskeletal: Positive for myalgias.   Gastrointestinal: Negative for abdominal pain and nausea.   Neurological: Positive for headaches.       Objective:      Physical Exam   Constitutional: She is oriented to person, place, and time. Vital signs are normal. She appears well-developed and well-nourished. She appears listless. She is cooperative.  Non-toxic appearance. She does not appear ill. No distress.   HENT:   Head: Normocephalic and atraumatic.   Right Ear: Hearing, external ear and ear canal normal. Tympanic membrane is not erythematous and not bulging.   Left Ear: Hearing, external ear and ear canal normal. Tympanic membrane is not erythematous " and not bulging.   Nose: Mucosal edema (with discharge) and rhinorrhea present. No nasal deformity. No epistaxis. Right sinus exhibits maxillary sinus tenderness. Right sinus exhibits no frontal sinus tenderness. Left sinus exhibits maxillary sinus tenderness. Left sinus exhibits no frontal sinus tenderness.   Mouth/Throat: Uvula is midline and mucous membranes are normal. No trismus in the jaw. Normal dentition. No uvula swelling. Posterior oropharyngeal erythema (with petechiae) present. No oropharyngeal exudate.   Bilateral dull TM   Eyes: Conjunctivae and lids are normal. No scleral icterus.   Sclera clear bilat   Neck: Trachea normal, full passive range of motion without pain and phonation normal. Neck supple. No neck rigidity.   Cardiovascular: Normal rate, regular rhythm, normal heart sounds, intact distal pulses and normal pulses.    Pulmonary/Chest: Effort normal and breath sounds normal. No respiratory distress. She has no decreased breath sounds. She has no wheezes. She has no rhonchi. She has no rales.   Cough on exam   Musculoskeletal: Normal range of motion. She exhibits no edema or deformity.   Lymphadenopathy:     She has no cervical adenopathy.   Neurological: She is oriented to person, place, and time. She appears listless. She exhibits normal muscle tone. Coordination normal.   Skin: Skin is warm, dry and intact. She is not diaphoretic. No pallor.   Psychiatric: She has a normal mood and affect. Her speech is normal and behavior is normal. Judgment and thought content normal. Cognition and memory are normal.   Nursing note and vitals reviewed.      Office Visit on 01/29/2018   Component Date Value Ref Range Status    Rapid Influenza A Ag 01/29/2018 Negative  Negative Final    Rapid Influenza B Ag 01/29/2018 Negative  Negative Final     Acceptable 01/29/2018 Yes   Final     Assessment:       1. Acute maxillary sinusitis, recurrence not specified    2. Fever, unspecified fever  cause    3. Cough        Plan:         Acute maxillary sinusitis, recurrence not specified  -     cefdinir (OMNICEF) 300 MG capsule; Take 1 capsule (300 mg total) by mouth 2 (two) times daily.  Dispense: 20 capsule; Refill: 0  -     fluticasone (FLONASE) 50 mcg/actuation nasal spray; 1 spray (50 mcg total) by Each Nare route once daily.  Dispense: 16 g; Refill: 0  -     promethazine-dextromethorphan (PROMETHAZINE-DM) 6.25-15 mg/5 mL Syrp; Take 5 mLs by mouth every 4 to 6 hours as needed.  Dispense: 118 mL; Refill: 0    Fever, unspecified fever cause  -     POCT Influenza A/B    Cough  -     promethazine-dextromethorphan (PROMETHAZINE-DM) 6.25-15 mg/5 mL Syrp; Take 5 mLs by mouth every 4 to 6 hours as needed.  Dispense: 118 mL; Refill: 0      Patient Instructions   Please return here or go to the Emergency Department for any concerns or worsening of condition.  If you were prescribed antibiotics, please take them to completion.  If you were prescribed a narcotic medication, do not drive or operate heavy equipment or machinery while taking these medications.  Please follow up with your primary care doctor or specialist as needed.    If you  smoke, please stop smoking.    Symptomatic treatment:    Tylenol every 4 hours  salt water gargles  Cold-eeze helps to reduce the duration of sore throat symptoms  Cepachol helps to numb the discomfort  Chloroseptic spray  Nasal saline spray reduces inflammation and dryness  Warm face compresses as often as you can  Vicks vapor rub at night  Flonase OTC or Nasacort OTC  Simple foods like chicken noodle soup help  Mucinex DM or use Coricidin HBP if you have hypertension  Zyrtec/Claritin/Xyzal during the day and Benadryl at night may help if allergy component   Pedialyte helps with dehydration if lacking appetite  Rest as much as you can      Sinusitis (Antibiotic Treatment)    The sinuses are air-filled spaces within the bones of the face. They connect to the inside of the  nose. Sinusitis is an inflammation of the tissue lining the sinus cavity. Sinus inflammation can occur during a cold. It can also be due to allergies to pollens and other particles in the air. Sinusitis can cause symptoms of sinus congestion and fullness. A sinus infection causes fever, headache and facial pain. There is often green or yellow drainage from the nose or into the back of the throat (post-nasal drip). You have been given antibiotics to treat this condition.  Home care:  · Take the full course of antibiotics as instructed. Do not stop taking them, even if you feel better.  · Drink plenty of water, hot tea, and other liquids. This may help thin mucus. It also may promote sinus drainage.  · Heat may help soothe painful areas of the face. Use a towel soaked in hot water. Or,  the shower and direct the hot spray onto your face. Using a vaporizer along with a menthol rub at night may also help.   · An expectorant containing guaifenesin may help thin the mucus and promote drainage from the sinuses.  · Over-the-counter decongestants may be used unless a similar medicine was prescribed. Nasal sprays work the fastest. Use one that contains phenylephrine or oxymetazoline. First blow the nose gently. Then use the spray. Do not use these medicines more often than directed on the label or symptoms may get worse. You may also use tablets containing pseudoephedrine. Avoid products that combine ingredients, because side effects may be increased. Read labels. You can also ask the pharmacist for help. (NOTE: Persons with high blood pressure should not use decongestants. They can raise blood pressure.)  · Over-the-counter antihistamines may help if allergies contributed to your sinusitis.    · Do not use nasal rinses or irrigation during an acute sinus infection, unless told to by your health care provider. Rinsing may spread the infection to other sinuses.  · Use acetaminophen or ibuprofen to control pain, unless  another pain medicine was prescribed. (If you have chronic liver or kidney disease or ever had a stomach ulcer, talk with your doctor before using these medicines. Aspirin should never be used in anyone under 18 years of age who is ill with a fever. It may cause severe liver damage.)  · Don't smoke. This can worsen symptoms.  Follow-up care  Follow up with your healthcare provider or our staff if you are not improving within the next week.  When to seek medical advice  Call your healthcare provider if any of these occur:  · Facial pain or headache becoming more severe  · Stiff neck  · Unusual drowsiness or confusion  · Swelling of the forehead or eyelids  · Vision problems, including blurred or double vision  · Fever of 100.4ºF (38ºC) or higher, or as directed by your healthcare provider  · Seizure  · Breathing problems  · Symptoms not resolving within 10 days  Date Last Reviewed: 4/13/2015  © 8089-5669 [employer] [address] [place] [address], [place] [address]. All rights reserved. This information is not intended as a substitute for professional medical care. Always follow your healthcare professional's instructions.

## 2018-03-06 ENCOUNTER — OFFICE VISIT (OUTPATIENT)
Dept: OPTOMETRY | Facility: CLINIC | Age: 73
End: 2018-03-06
Payer: MEDICARE

## 2018-03-06 DIAGNOSIS — H35.30 AMD (AGE RELATED MACULAR DEGENERATION): Primary | ICD-10-CM

## 2018-03-06 DIAGNOSIS — Z13.5 GLAUCOMA SCREENING: ICD-10-CM

## 2018-03-06 DIAGNOSIS — Z96.1 PSEUDOPHAKIA OF BOTH EYES: ICD-10-CM

## 2018-03-06 PROBLEM — H25.10 SENILE NUCLEAR SCLEROSIS: Status: RESOLVED | Noted: 2017-06-22 | Resolved: 2018-03-06

## 2018-03-06 PROBLEM — H25.11 NUCLEAR SCLEROSIS OF RIGHT EYE: Status: RESOLVED | Noted: 2017-04-21 | Resolved: 2018-03-06

## 2018-03-06 PROBLEM — Z98.890 POST-OPERATIVE STATE: Status: RESOLVED | Noted: 2017-06-23 | Resolved: 2018-03-06

## 2018-03-06 PROBLEM — H26.9 CORTICAL CATARACT OF BOTH EYES: Status: RESOLVED | Noted: 2017-04-21 | Resolved: 2018-03-06

## 2018-03-06 PROBLEM — H52.7 REFRACTIVE ERROR: Status: RESOLVED | Noted: 2017-04-21 | Resolved: 2018-03-06

## 2018-03-06 PROCEDURE — 99212 OFFICE O/P EST SF 10 MIN: CPT | Mod: PBBFAC,PO | Performed by: OPTOMETRIST

## 2018-03-06 PROCEDURE — 92014 COMPRE OPH EXAM EST PT 1/>: CPT | Mod: S$PBB,,, | Performed by: OPTOMETRIST

## 2018-03-06 PROCEDURE — 99999 PR PBB SHADOW E&M-EST. PATIENT-LVL II: CPT | Mod: PBBFAC,,, | Performed by: OPTOMETRIST

## 2018-03-06 NOTE — PROGRESS NOTES
HPI     DSL- 8/7/17     Pt states Va has been well after CE OU. Pt denies eye allergies, floaters,   and flashes.     Eyemeds  No gtts    Last edited by Kindra Jiménez on 3/6/2018 12:53 PM. (History)            Assessment /Plan     For exam results, see Encounter Report.    AMD (age related macular degeneration)  -ICAPs BID PO  -monitor annual DFE    Glaucoma screening  -Monitor with annual eye exam and IOP check    PSEUDOPHAKIA  -Clear, centered      RTC 1 yr

## 2018-04-23 ENCOUNTER — LAB VISIT (OUTPATIENT)
Dept: LAB | Facility: HOSPITAL | Age: 73
End: 2018-04-23
Attending: INTERNAL MEDICINE
Payer: MEDICARE

## 2018-04-23 ENCOUNTER — OFFICE VISIT (OUTPATIENT)
Dept: FAMILY MEDICINE | Facility: CLINIC | Age: 73
End: 2018-04-23
Payer: MEDICARE

## 2018-04-23 VITALS
BODY MASS INDEX: 45.18 KG/M2 | TEMPERATURE: 98 F | OXYGEN SATURATION: 98 % | WEIGHT: 239.31 LBS | SYSTOLIC BLOOD PRESSURE: 138 MMHG | HEIGHT: 61 IN | HEART RATE: 78 BPM | DIASTOLIC BLOOD PRESSURE: 65 MMHG

## 2018-04-23 DIAGNOSIS — E78.5 HYPERLIPIDEMIA, UNSPECIFIED HYPERLIPIDEMIA TYPE: ICD-10-CM

## 2018-04-23 DIAGNOSIS — N18.30 BENIGN HYPERTENSION WITH CHRONIC KIDNEY DISEASE, STAGE III: Primary | ICD-10-CM

## 2018-04-23 DIAGNOSIS — Z85.3 HISTORY OF BREAST CANCER: ICD-10-CM

## 2018-04-23 DIAGNOSIS — R73.03 PREDIABETES: ICD-10-CM

## 2018-04-23 DIAGNOSIS — Z23 NEED FOR SHINGLES VACCINE: ICD-10-CM

## 2018-04-23 DIAGNOSIS — E66.01 MORBID OBESITY WITH BMI OF 45.0-49.9, ADULT: ICD-10-CM

## 2018-04-23 DIAGNOSIS — K21.9 GASTROESOPHAGEAL REFLUX DISEASE WITHOUT ESOPHAGITIS: ICD-10-CM

## 2018-04-23 DIAGNOSIS — I12.9 BENIGN HYPERTENSION WITH CHRONIC KIDNEY DISEASE, STAGE III: ICD-10-CM

## 2018-04-23 DIAGNOSIS — Z12.11 ENCOUNTER FOR FIT (FECAL IMMUNOCHEMICAL TEST) SCREENING: ICD-10-CM

## 2018-04-23 DIAGNOSIS — Z23 NEED FOR TDAP VACCINATION: ICD-10-CM

## 2018-04-23 DIAGNOSIS — N18.30 BENIGN HYPERTENSION WITH CHRONIC KIDNEY DISEASE, STAGE III: ICD-10-CM

## 2018-04-23 DIAGNOSIS — I12.9 BENIGN HYPERTENSION WITH CHRONIC KIDNEY DISEASE, STAGE III: Primary | ICD-10-CM

## 2018-04-23 DIAGNOSIS — I70.0 ATHEROSCLEROSIS OF AORTA: ICD-10-CM

## 2018-04-23 PROBLEM — I10 ESSENTIAL HYPERTENSION: Status: RESOLVED | Noted: 2017-04-21 | Resolved: 2018-04-23

## 2018-04-23 LAB
ALBUMIN SERPL BCP-MCNC: 3.6 G/DL
ALP SERPL-CCNC: 64 U/L
ALT SERPL W/O P-5'-P-CCNC: 12 U/L
ANION GAP SERPL CALC-SCNC: 9 MMOL/L
AST SERPL-CCNC: 23 U/L
BASOPHILS # BLD AUTO: 0.05 K/UL
BASOPHILS NFR BLD: 0.6 %
BILIRUB SERPL-MCNC: 0.3 MG/DL
BUN SERPL-MCNC: 31 MG/DL
CALCIUM SERPL-MCNC: 9.5 MG/DL
CHLORIDE SERPL-SCNC: 106 MMOL/L
CHOLEST SERPL-MCNC: 151 MG/DL
CHOLEST/HDLC SERPL: 3.3 {RATIO}
CO2 SERPL-SCNC: 27 MMOL/L
CREAT SERPL-MCNC: 1 MG/DL
DIFFERENTIAL METHOD: ABNORMAL
EOSINOPHIL # BLD AUTO: 0.2 K/UL
EOSINOPHIL NFR BLD: 2.2 %
ERYTHROCYTE [DISTWIDTH] IN BLOOD BY AUTOMATED COUNT: 13.3 %
EST. GFR  (AFRICAN AMERICAN): >60 ML/MIN/1.73 M^2
EST. GFR  (NON AFRICAN AMERICAN): 56 ML/MIN/1.73 M^2
GLUCOSE SERPL-MCNC: 121 MG/DL
HCT VFR BLD AUTO: 39.4 %
HDLC SERPL-MCNC: 46 MG/DL
HDLC SERPL: 30.5 %
HGB BLD-MCNC: 12.3 G/DL
IMM GRANULOCYTES # BLD AUTO: 0.02 K/UL
IMM GRANULOCYTES NFR BLD AUTO: 0.3 %
LDLC SERPL CALC-MCNC: 75.4 MG/DL
LYMPHOCYTES # BLD AUTO: 2.1 K/UL
LYMPHOCYTES NFR BLD: 27.2 %
MCH RBC QN AUTO: 29.8 PG
MCHC RBC AUTO-ENTMCNC: 31.2 G/DL
MCV RBC AUTO: 95 FL
MONOCYTES # BLD AUTO: 0.7 K/UL
MONOCYTES NFR BLD: 8.4 %
NEUTROPHILS # BLD AUTO: 4.8 K/UL
NEUTROPHILS NFR BLD: 61.3 %
NONHDLC SERPL-MCNC: 105 MG/DL
NRBC BLD-RTO: 0 /100 WBC
PLATELET # BLD AUTO: 238 K/UL
PMV BLD AUTO: 10.7 FL
POTASSIUM SERPL-SCNC: 4.5 MMOL/L
PROT SERPL-MCNC: 7.5 G/DL
RBC # BLD AUTO: 4.13 M/UL
SODIUM SERPL-SCNC: 142 MMOL/L
TRIGL SERPL-MCNC: 148 MG/DL
WBC # BLD AUTO: 7.77 K/UL

## 2018-04-23 PROCEDURE — 80061 LIPID PANEL: CPT

## 2018-04-23 PROCEDURE — 99497 ADVNCD CARE PLAN 30 MIN: CPT | Mod: PBBFAC,PO | Performed by: INTERNAL MEDICINE

## 2018-04-23 PROCEDURE — 99213 OFFICE O/P EST LOW 20 MIN: CPT | Mod: PBBFAC,PO | Performed by: INTERNAL MEDICINE

## 2018-04-23 PROCEDURE — 99497 ADVNCD CARE PLAN 30 MIN: CPT | Mod: 25,S$PBB,, | Performed by: INTERNAL MEDICINE

## 2018-04-23 PROCEDURE — 99999 PR PBB SHADOW E&M-EST. PATIENT-LVL III: CPT | Mod: PBBFAC,,, | Performed by: INTERNAL MEDICINE

## 2018-04-23 PROCEDURE — 83036 HEMOGLOBIN GLYCOSYLATED A1C: CPT

## 2018-04-23 PROCEDURE — 80053 COMPREHEN METABOLIC PANEL: CPT

## 2018-04-23 PROCEDURE — 99215 OFFICE O/P EST HI 40 MIN: CPT | Mod: S$PBB,,, | Performed by: INTERNAL MEDICINE

## 2018-04-23 PROCEDURE — 85025 COMPLETE CBC W/AUTO DIFF WBC: CPT

## 2018-04-23 PROCEDURE — 36415 COLL VENOUS BLD VENIPUNCTURE: CPT | Mod: PO

## 2018-04-23 RX ORDER — HYDROCHLOROTHIAZIDE 25 MG/1
25 TABLET ORAL DAILY
Qty: 90 TABLET | Refills: 1 | Status: SHIPPED | OUTPATIENT
Start: 2018-04-23 | End: 2018-12-12 | Stop reason: SDUPTHER

## 2018-04-23 RX ORDER — FLUTICASONE PROPIONATE 50 MCG
1 SPRAY, SUSPENSION (ML) NASAL DAILY
Qty: 16 G | Refills: 0 | Status: CANCELLED | OUTPATIENT
Start: 2018-04-23 | End: 2019-04-23

## 2018-04-23 RX ORDER — PRAVASTATIN SODIUM 10 MG/1
10 TABLET ORAL DAILY
Qty: 90 TABLET | Refills: 1 | Status: SHIPPED | OUTPATIENT
Start: 2018-04-23 | End: 2018-12-12 | Stop reason: SDUPTHER

## 2018-04-23 RX ORDER — FENOFIBRATE 145 MG/1
145 TABLET, FILM COATED ORAL DAILY
Qty: 90 TABLET | Refills: 1 | Status: SHIPPED | OUTPATIENT
Start: 2018-04-23 | End: 2018-12-12 | Stop reason: SDUPTHER

## 2018-04-23 RX ORDER — LISINOPRIL 40 MG/1
40 TABLET ORAL DAILY
Qty: 90 TABLET | Refills: 1 | Status: SHIPPED | OUTPATIENT
Start: 2018-04-23 | End: 2018-12-12 | Stop reason: SDUPTHER

## 2018-04-23 NOTE — PROGRESS NOTES
HISTORY OF PRESENT ILLNESS:  Francoise De Jesus is a 72 y.o. female who presents to the clinic today for Annual Exam  .  The patient presents to clinic today for follow-up of her hypertension, located by chronic kidney disease stage III, hyperlipidemia, and prediabetes.  She does not check her blood sugars or blood pressures at home.  She has overall fair dietary habits.  She does not exercise regularly. The patient denies any problems with cardiac chest pain, dizziness, palpitations, orthopnea, or lower extremity edema.  The patient reports compliance with current medication- patient denies missing any  medication doses.  She denies abdominal pain, temperature intolerance, or unexplained changes in her weight.      PAST MEDICAL HISTORY:  Past Medical History:   Diagnosis Date    Arthritis     Atherosclerosis of aorta     noted on CXR 11/17/2014    Breast cancer 2011    stage I right breast cancer    History of peptic ulcer     Hyperlipidemia     Hypertension     Macular degeneration 3/13/2017    Morbid obesity     Nuclear sclerosis of both eyes 4/21/2017    Prediabetes        PAST SURGICAL HISTORY:  Past Surgical History:   Procedure Laterality Date    BREAST BIOPSY      BREAST LUMPECTOMY Right 11/2011    right lumpectomy and SN biopsy    CATARACT EXTRACTION Left 07/22/2017    Dr. Okeefe    CATARACT EXTRACTION W/  INTRAOCULAR LENS IMPLANT Right 07/06/2017    Dr. Okeefe    CHOLECYSTECTOMY      EYE SURGERY      Lt cataract       SOCIAL HISTORY:  Social History     Social History    Marital status:      Spouse name: N/A    Number of children: 2    Years of education: N/A     Occupational History          Social History Main Topics    Smoking status: Never Smoker    Smokeless tobacco: Never Used    Alcohol use No    Drug use: No    Sexual activity: Yes     Partners: Male     Other Topics Concern    Not on file     Social History Narrative    No narrative on file       FAMILY  HISTORY:  Family History   Problem Relation Age of Onset    Stomach cancer Mother     Lung cancer Father     Hypertension Father     Breast cancer Sister 58    Cataracts Sister     Macular degeneration Sister     Macular degeneration Brother      wet    Cataracts Sister     Macular degeneration Sister     Breast cancer Sister 72    Macular degeneration Sister     Macular degeneration Sister     Macular degeneration Brother      dry    Hypertension Brother     Diabetes Brother     Hypertension Brother     Prostate cancer Brother     Hypertension Brother     Hyperlipidemia Brother     No Known Problems Maternal Grandmother     No Known Problems Maternal Grandfather     No Known Problems Paternal Grandmother     No Known Problems Paternal Grandfather     Ovarian cancer Neg Hx     Amblyopia Neg Hx     Blindness Neg Hx     Glaucoma Neg Hx     Retinal detachment Neg Hx     Strabismus Neg Hx     Stroke Neg Hx     Thyroid disease Neg Hx        ALLERGIES AND MEDICATIONS: updated and reviewed.  Review of patient's allergies indicates:   Allergen Reactions    Medrol [methylprednisolone] Other (See Comments)     Depression/tearful    Penicillins      Other reaction(s): Rash     Medication List with Changes/Refills   Current Medications    ACETAMINOPHEN (TYLENOL) 500 MG TABLET    Take 500 mg by mouth every 6 (six) hours as needed for Pain.    ANTIOX#10/OM3/DHA/EPA/LUT/ZEAX (I-CAPS ORAL)    Take 1 tablet by mouth once daily.    ASPIRIN (ECOTRIN) 81 MG EC TABLET    Take 1 tablet (81 mg total) by mouth once daily.    FLUTICASONE (FLONASE) 50 MCG/ACTUATION NASAL SPRAY    1 spray (50 mcg total) by Each Nare route once daily.    IBUPROFEN (ADVIL,MOTRIN) 800 MG TABLET    Take 1 tablet (800 mg total) by mouth every 6 (six) hours as needed for Pain.    OMEPRAZOLE (PRILOSEC) 40 MG CAPSULE    Take 1 capsule (40 mg total) by mouth once daily.   Changed and/or Refilled Medications    Modified Medication  Previous Medication    FENOFIBRATE (TRICOR) 145 MG TABLET fenofibrate (TRICOR) 145 MG tablet       Take 1 tablet (145 mg total) by mouth once daily. 1 Tablet Oral Every evening    Take 1 tablet (145 mg total) by mouth once daily. 1 Tablet Oral Every evening    HYDROCHLOROTHIAZIDE (HYDRODIURIL) 25 MG TABLET hydroCHLOROthiazide (HYDRODIURIL) 25 MG tablet       Take 1 tablet (25 mg total) by mouth once daily. 1 Tablet Oral Every morning    Take 1 tablet (25 mg total) by mouth once daily. 1 Tablet Oral Every morning    LISINOPRIL (PRINIVIL,ZESTRIL) 40 MG TABLET lisinopril (PRINIVIL,ZESTRIL) 40 MG tablet       Take 1 tablet (40 mg total) by mouth once daily. 1 Tablet Oral Every morning    Take 1 tablet (40 mg total) by mouth once daily. 1 Tablet Oral Every morning    PRAVASTATIN (PRAVACHOL) 10 MG TABLET pravastatin (PRAVACHOL) 10 MG tablet       Take 1 tablet (10 mg total) by mouth once daily.    Take 1 tablet (10 mg total) by mouth once daily.          CARE TEAM:  Patient Care Team:  Lulu Auguste MD as PCP - General (Internal Medicine)  [facility] Auguste [facility]  Kyler Hickman MD as Consulting Physician (Hematology and Oncology)         REVIEW OF SYSTEMS:  Review of Systems   Constitutional: Negative for chills, fatigue, fever and unexpected weight change.   HENT: Negative for congestion, ear discharge, ear pain and postnasal drip.    Eyes: Negative for photophobia, pain and visual disturbance.   Respiratory: Negative for cough, shortness of breath and wheezing.    Cardiovascular: Negative for chest pain, palpitations and leg swelling.   Gastrointestinal: Negative for abdominal pain, constipation, diarrhea, nausea and vomiting.   Genitourinary: Negative for dysuria, frequency, urgency and vaginal discharge.   Musculoskeletal: Negative for back pain, joint swelling and neck stiffness.        - chronic knee pain due to osteoarthritis - she takes tylenol as needed for pain   Skin: Negative for  "rash.   Neurological: Negative for weakness and headaches.   Psychiatric/Behavioral: Negative for dysphoric mood and sleep disturbance. The patient is not nervous/anxious.          PHYSICAL EXAM:   Vitals:    04/23/18 1132   BP: 138/65   Pulse:    Temp:      Weight: 108.6 kg (239 lb 5 oz)   Height: 5' 1" (154.9 cm)   Body mass index is 45.22 kg/m².     General appearance - alert, well appearing, and in no distress and morbidly obese  Mental status - alert, oriented to person, place, and time, normal mood, behavior, speech, dress, motor activity, and thought processes  Eyes - pupils equal and reactive, extraocular eye movements intact, sclera anicteric  Mouth - mucous membranes moist, pharynx normal without lesions  Neck - supple, no significant adenopathy, carotids upstroke normal bilaterally, no bruits, thyroid exam: thyroid is normal in size without nodules or tenderness  Lymphatics - no palpable lymphadenopathy  Chest - clear to auscultation, no wheezes, rales or rhonchi, symmetric air entry  Heart - normal rate and regular rhythm, no gallops noted  Neurological - alert, oriented, normal speech, no focal findings or movement disorder noted, cranial nerves II through XII intact  Musculoskeletal - no muscular tenderness noted, Moderate osteoarthritic changes noted to both knee joints. No joint effusions noted.   Extremities - no pedal edema noted  Skin - normal coloration and turgor, no rashes, no suspicious skin lesions noted      ASSESSMENT AND PLAN:  1. Benign hypertension with chronic kidney disease, stage III  Discussed sodium restriction, maintaining ideal body weight and regular exercise program as physiologic means to achieve blood pressure control. The patient will strive towards this. The current medical regimen is effective;  continue present plan and medications. Recommended patient to check home readings to monitor and see me for followup as scheduled or sooner as needed. Patient was educated that both " decongestant and anti-inflammatory medication may raise blood pressure. Stable kidney function. Observe. Patient counseled to avoid/minimize the use of anti-inflammatory  Medication. Discussed to stay well hydrated. Also discussed with patient that good control of blood pressure or diabetes, if present, will help to prevent progression.  Stable kidney function. Observe. Patient counseled to avoid/minimize the use of anti-inflammatory  Medication. Discussed to stay well hydrated. Also discussed with patient that good control of blood pressure or diabetes, if present, will help to prevent progression.   - CBC auto differential; Future  - lisinopril (PRINIVIL,ZESTRIL) 40 MG tablet; Take 1 tablet (40 mg total) by mouth once daily. 1 Tablet Oral Every morning  Dispense: 90 tablet; Refill: 1  - hydroCHLOROthiazide (HYDRODIURIL) 25 MG tablet; Take 1 tablet (25 mg total) by mouth once daily. 1 Tablet Oral Every morning  Dispense: 90 tablet; Refill: 1    2. Hyperlipidemia, unspecified hyperlipidemia type  We discussed low fat diet and regular exercise.The current medical regimen is effective;  continue present plan and medications.    - Comprehensive metabolic panel; Future  - Lipid panel; Future  - pravastatin (PRAVACHOL) 10 MG tablet; Take 1 tablet (10 mg total) by mouth once daily.  Dispense: 90 tablet; Refill: 1  - fenofibrate (TRICOR) 145 MG tablet; Take 1 tablet (145 mg total) by mouth once daily. 1 Tablet Oral Every evening  Dispense: 90 tablet; Refill: 1    3. Prediabetes  Stable. We discussed low sugar/low carbohydrate diet and regular exercise to prevent progression. No need for prescription medication at this time.   - Hemoglobin A1c; Future    4. Atherosclerosis of aorta  Patient with Atherosclerosis of the Aorta.  Stable/asymptomatic. Currently stable on lipid lowering medication and b/p monitoring.     5. Gastroesophageal reflux disease without esophagitis  Symptoms controlled. Reflux precautions discussed  (eliminate tobacco if a smoker; minimize caffeine, chocolate and red/white peppermint intake; avoid heavy and spicy meals; don't lay down within 2-3 hours after eating). Medication as needed. Patient asked to take medication breaks, if possible - discussed chronic use can limit calcium absorption, increases the risk for intestinal infections, and can cause kidney damage. There are also some newer studies that show possible increased risk of mortality.     6. History of breast cancer  Stable. Up to date on surveillance.    7. Morbid obesity with BMI of 45.0-49.9, adult  The patient is asked to make an attempt to improve diet and exercise patterns to aid in medical management of this problem.     8. Encounter for FIT (fecal immunochemical test) screening  FitKit was given to patient on 4/23/2018 9:15 AM         - Fecal Immunochemical Test (iFOBT); Future    9. Need for Tdap vaccination  Patient was advised to get immunization at the pharmacy.     10. Need for shingles vaccine  Patient was advised to get immunization at the pharmacy.      11. Advanced directive counseling  I had a discussion today with the patient, family, and/or surrogate regarding advanced directives. Paperwork was given to complete living will and medical POA. Sherif was discussed. Approximately 5 minute(s) spent on counseling/discussion regarding end of life decision making.           Follow-up in about 6 months (around 10/23/2018), or if symptoms worsen or fail to improve, for follow up chronic medical conditions.. or sooner as needed.

## 2018-04-24 LAB
ESTIMATED AVG GLUCOSE: 134 MG/DL
HBA1C MFR BLD HPLC: 6.3 %

## 2018-04-30 ENCOUNTER — LAB VISIT (OUTPATIENT)
Dept: LAB | Facility: HOSPITAL | Age: 73
End: 2018-04-30
Attending: INTERNAL MEDICINE
Payer: MEDICARE

## 2018-04-30 ENCOUNTER — TELEPHONE (OUTPATIENT)
Dept: FAMILY MEDICINE | Facility: CLINIC | Age: 73
End: 2018-04-30

## 2018-04-30 DIAGNOSIS — Z12.11 ENCOUNTER FOR FIT (FECAL IMMUNOCHEMICAL TEST) SCREENING: ICD-10-CM

## 2018-04-30 LAB — HEMOCCULT STL QL IA: POSITIVE

## 2018-04-30 PROCEDURE — 82274 ASSAY TEST FOR BLOOD FECAL: CPT

## 2018-04-30 NOTE — TELEPHONE ENCOUNTER
Please call patient: her fitkit result was positive. It is recommended that we do a colonoscopy for further evaluation.

## 2018-04-30 NOTE — TELEPHONE ENCOUNTER
Please call patient: her fitkit result was positive. It is recommended that we do a colonoscopy for further evaluation.    Informed the pt, of these recommendations.  Patient states she is not ready to have a colonoscopy just yet. Patient verbalized understandings.

## 2018-05-03 NOTE — TELEPHONE ENCOUNTER
Spoke with patient and also advised her of the positive fitkit. She states that she strained when she when to the bathroom and she is pretty sure it made her hemorrhoids bleed and she wishes she had not sent that specimen. She would like to repeat it, but I advised her that once we get a positive result, even if the repeat is negative, we would still recommend a colonoscopy for further evaluation. Patient states she is not interested at this time - maybe next year. I advised her that we could be missing colon cancer or polyps in the early stages. She voiced understanding and is still not interested at this time. She will call me if she changes her mind.

## 2018-06-26 ENCOUNTER — PES CALL (OUTPATIENT)
Dept: ADMINISTRATIVE | Facility: CLINIC | Age: 73
End: 2018-06-26

## 2018-07-12 NOTE — PATIENT INSTRUCTIONS
Continue all current medications  Avoid all anti-inflammatories   Have mammogram as scheduled  Watch diet closely  Try to begin a exercise program    Change dressing every: - weekly    Compression Therapy:  Coflex calamine unna boot  Avoid prolonged standing in one place. Elevate leg(s)as much as possible. Follow-Up Appointments  Return Appointment in 1 week.     Misc/Additional Orders  Supplement wi

## 2018-07-24 ENCOUNTER — OFFICE VISIT (OUTPATIENT)
Dept: FAMILY MEDICINE | Facility: CLINIC | Age: 73
End: 2018-07-24
Payer: MEDICARE

## 2018-07-24 VITALS
OXYGEN SATURATION: 97 % | WEIGHT: 226.31 LBS | DIASTOLIC BLOOD PRESSURE: 74 MMHG | BODY MASS INDEX: 42.73 KG/M2 | HEART RATE: 82 BPM | HEIGHT: 61 IN | SYSTOLIC BLOOD PRESSURE: 126 MMHG

## 2018-07-24 DIAGNOSIS — E66.01 MORBID OBESITY WITH BMI OF 40.0-44.9, ADULT: ICD-10-CM

## 2018-07-24 DIAGNOSIS — K21.9 GASTROESOPHAGEAL REFLUX DISEASE WITHOUT ESOPHAGITIS: ICD-10-CM

## 2018-07-24 DIAGNOSIS — Z85.3 HISTORY OF BREAST CANCER: ICD-10-CM

## 2018-07-24 DIAGNOSIS — Z23 NEED FOR VACCINATION: ICD-10-CM

## 2018-07-24 DIAGNOSIS — Z00.00 ENCOUNTER FOR PREVENTIVE HEALTH EXAMINATION: Primary | ICD-10-CM

## 2018-07-24 DIAGNOSIS — E78.5 HYPERLIPIDEMIA, UNSPECIFIED HYPERLIPIDEMIA TYPE: ICD-10-CM

## 2018-07-24 DIAGNOSIS — I70.0 ATHEROSCLEROSIS OF AORTA: ICD-10-CM

## 2018-07-24 PROCEDURE — 99214 OFFICE O/P EST MOD 30 MIN: CPT | Mod: PBBFAC,PO | Performed by: NURSE PRACTITIONER

## 2018-07-24 PROCEDURE — G0439 PPPS, SUBSEQ VISIT: HCPCS | Mod: ,,, | Performed by: NURSE PRACTITIONER

## 2018-07-24 PROCEDURE — 99999 PR PBB SHADOW E&M-EST. PATIENT-LVL IV: CPT | Mod: PBBFAC,,, | Performed by: NURSE PRACTITIONER

## 2018-07-24 NOTE — PATIENT INSTRUCTIONS
Counseling and Referral of Other Preventative  (Italic type indicates deductible and co-insurance are waived)    Patient Name: Francoise De Jesus  Today's Date: 7/24/2018    Health Maintenance       Date Due Completion Date    TETANUS VACCINE 08[birth year]28/1963 Patient aware of recommendation for tetanus vaccine.     Zoster Vaccine 08/28/2004 Patient aware of recommendation for zoster vaccine.     Influenza Vaccine 08/01/2018 10/2/2017    Mammogram 11/27/2018 11/27/2017    Fecal Occult Blood Test (FOBT)/FitKit 04/30/2019 4/30/2018    High Dose Statin 07/24/2019 7/24/2018    Lipid Panel 04/23/2023 4/23/2018        No orders of the defined types were placed in this encounter.    The following information is provided to all patients.  This information is to help you find resources for any of the problems found today that may be affecting your health:                Living healthy guide: www.[facility].[place].gov      Understanding Diabetes: www.diabetes.org      Eating healthy: www.cdc.gov/healthyweight      [facility] home safety checklist: www.cdc.gov/steadi/patient.html      Agency on Aging: www.goea.[place].[facility]      Alcoholics anonymous (AA): www.aa.org      Physical Activity: www.andrea.nih.gov/wf9xyii      Tobacco use: www.quitwithusla.org

## 2018-07-24 NOTE — PROGRESS NOTES
"Francoise De Jesus presented for a  Medicare AWV and comprehensive Health Risk Assessment today. The following components were reviewed and updated:    · Medical history  · Family History  · Social history  · Allergies and Current Medications  · Health Risk Assessment  · Health Maintenance  · Care Team     ** See Completed Assessments for Annual Wellness Visit within the encounter summary.**       The following assessments were completed:  · Living Situation  · CAGE  · Depression Screening  · Timed Get Up and Go  · Whisper Test  · Cognitive Function Screening    ·   ·   · Nutrition Screening  · ADL Screening  · PAQ Screening    Vitals:    07/24/18 1119   BP: 126/74   BP Location: Left arm   Patient Position: Sitting   BP Method: Large (Manual)   Pulse: 82   SpO2: 97%   Weight: 102.7 kg (226 lb 4.8 oz)   Height: 5' 1" (1.549 m)     Body mass index is 42.76 kg/m².  Physical Exam   Constitutional: She is oriented to person, place, and time.   Cardiovascular: Normal rate, regular rhythm and normal heart sounds.    Pulmonary/Chest: Effort normal and breath sounds normal.   Musculoskeletal: Normal range of motion.   Neurological: She is alert and oriented to person, place, and time.   Skin: Skin is warm.   Psychiatric: She has a normal mood and affect. Her behavior is normal.   Vitals reviewed.        Diagnoses and health risks identified today and associated recommendations/orders:    1. Encounter for preventive health examination  Education provided about preventive health examinations and procedures; addressed and discussed patient's health concerns. Additionally, reviewed medical record for risk factors and documented the results during this encounter.    2. Morbid obesity with BMI of 40.0-44.9, adult  We discussed diet and exercise for weight loss.  Educated about diet, activities, and ways to avoid sedentary lifestyle.     3. Atherosclerosis of aorta  Stable, asymptomatic; monitor.     4. History of breast cancer  Stable, " patient evaluated/monitored by [facility]'s hematology/oncology dept; continue as advised.     5. Gastroesophageal reflux disease without esophagitis  Stable, managed with omeprazole. Continue as advised regarding dietary and lifestyle modifications.     6. Hyperlipidemia, unspecified hyperlipidemia type  Presently at goal. Continue as advised regarding dietary and lifestyle modifications.     7. Need for vaccination  Patient aware of recommendation for tetanus and zoster vaccines.     Provided Francoise with a 5-10 year written screening schedule and personal prevention plan. Recommendations were developed using the USPSTF age appropriate recommendations. Education, counseling, and referrals were provided as needed. After Visit Summary printed and given to patient which includes a list of additional screenings\tests needed.    Follow-up in about 1 year (around 7/24/2019) for assessment .    Jair Herron Jr, NP

## 2018-11-28 ENCOUNTER — TELEPHONE (OUTPATIENT)
Dept: HEMATOLOGY/ONCOLOGY | Facility: CLINIC | Age: 73
End: 2018-11-28

## 2018-11-28 NOTE — TELEPHONE ENCOUNTER
Returned call to patient to assist with rescheduling appointment. Mammogram had to be rescheduled and delayed, patient would like appointment to be same day. Appointment with Lisha Garcia NP changed to 12/6 at 1pm. Patient voiced understanding. New appointment slip mailed out.

## 2018-11-28 NOTE — TELEPHONE ENCOUNTER
----- Message from Goyo Ayoub sent at 11/28/2018 11:39 AM CST -----  Pt states she need to reschedule her appt with Lisha Beltran.    [phone]

## 2018-12-10 ENCOUNTER — HOSPITAL ENCOUNTER (OUTPATIENT)
Dept: RADIOLOGY | Facility: HOSPITAL | Age: 73
Discharge: HOME | End: 2018-12-10
Attending: INTERNAL MEDICINE
Payer: MEDICARE

## 2018-12-10 DIAGNOSIS — C50.912 MALIGNANT NEOPLASM OF LEFT BREAST, STAGE 1, ESTROGEN RECEPTOR POSITIVE: ICD-10-CM

## 2018-12-10 DIAGNOSIS — Z17.0 MALIGNANT NEOPLASM OF LEFT BREAST, STAGE 1, ESTROGEN RECEPTOR POSITIVE: ICD-10-CM

## 2018-12-10 DIAGNOSIS — E66.01 MORBID OBESITY WITH BMI OF 40.0-44.9, ADULT: ICD-10-CM

## 2018-12-10 PROCEDURE — 77062 BREAST TOMOSYNTHESIS BI: CPT | Mod: TC,PO

## 2018-12-10 PROCEDURE — 77066 DX MAMMO INCL CAD BI: CPT | Mod: 26,,, | Performed by: RADIOLOGY

## 2018-12-10 PROCEDURE — 77062 BREAST TOMOSYNTHESIS BI: CPT | Mod: 26,,, | Performed by: RADIOLOGY

## 2018-12-12 DIAGNOSIS — N18.30 BENIGN HYPERTENSION WITH CHRONIC KIDNEY DISEASE, STAGE III: ICD-10-CM

## 2018-12-12 DIAGNOSIS — I12.9 BENIGN HYPERTENSION WITH CHRONIC KIDNEY DISEASE, STAGE III: ICD-10-CM

## 2018-12-12 DIAGNOSIS — E78.5 HYPERLIPIDEMIA, UNSPECIFIED HYPERLIPIDEMIA TYPE: ICD-10-CM

## 2018-12-12 RX ORDER — FENOFIBRATE 145 MG/1
145 TABLET, FILM COATED ORAL DAILY
Qty: 90 TABLET | Refills: 0 | Status: SHIPPED | OUTPATIENT
Start: 2018-12-12 | End: 2019-03-18 | Stop reason: SDUPTHER

## 2018-12-12 RX ORDER — LISINOPRIL 40 MG/1
40 TABLET ORAL DAILY
Qty: 90 TABLET | Refills: 0 | Status: SHIPPED | OUTPATIENT
Start: 2018-12-12 | End: 2019-03-18 | Stop reason: SDUPTHER

## 2018-12-12 RX ORDER — PRAVASTATIN SODIUM 10 MG/1
10 TABLET ORAL DAILY
Qty: 90 TABLET | Refills: 0 | Status: SHIPPED | OUTPATIENT
Start: 2018-12-12 | End: 2019-03-18 | Stop reason: SDUPTHER

## 2018-12-12 RX ORDER — HYDROCHLOROTHIAZIDE 25 MG/1
25 TABLET ORAL DAILY
Qty: 90 TABLET | Refills: 0 | Status: SHIPPED | OUTPATIENT
Start: 2018-12-12 | End: 2019-03-18 | Stop reason: SDUPTHER

## 2018-12-12 NOTE — TELEPHONE ENCOUNTER
----- Message from Audelia Stack sent at 12/12/2018 10:42 AM CST -----  Contact: Self   Patient need a refill on her medication. Please call patient at [phone].      lisinopril (PRINIVIL,ZESTRIL) 40 MG tablet  pravastatin (PRAVACHOL) 10 MG tablet  fenofibrate (TRICOR) 145 MG tablet  hydroCHLOROthiazide (HYDRODIURIL) 25 MG tablet      [facility] - [id number] [facility]

## 2019-01-31 ENCOUNTER — EXTERNAL CHRONIC CARE MANAGEMENT (OUTPATIENT)
Dept: PRIMARY CARE CLINIC | Facility: CLINIC | Age: 74
End: 2019-01-31
Payer: MEDICARE

## 2019-01-31 PROCEDURE — 99490 CHRNC CARE MGMT STAFF 1ST 20: CPT | Mod: S$PBB,,, | Performed by: INTERNAL MEDICINE

## 2019-01-31 PROCEDURE — 99490 CHRNC CARE MGMT STAFF 1ST 20: CPT | Mod: PBBFAC,PO | Performed by: INTERNAL MEDICINE

## 2019-01-31 PROCEDURE — 99490 PR CHRONIC CARE MGMT, 1ST 20 MIN: ICD-10-PCS | Mod: S$PBB,,, | Performed by: INTERNAL MEDICINE

## 2019-03-04 ENCOUNTER — PATIENT OUTREACH (OUTPATIENT)
Dept: ADMINISTRATIVE | Facility: HOSPITAL | Age: 74
End: 2019-03-04

## 2019-03-18 ENCOUNTER — OFFICE VISIT (OUTPATIENT)
Dept: FAMILY MEDICINE | Facility: CLINIC | Age: 74
End: 2019-03-18
Payer: MEDICARE

## 2019-03-18 VITALS
DIASTOLIC BLOOD PRESSURE: 70 MMHG | HEIGHT: 61 IN | RESPIRATION RATE: 20 BRPM | OXYGEN SATURATION: 98 % | WEIGHT: 213.63 LBS | HEART RATE: 70 BPM | TEMPERATURE: 98 F | BODY MASS INDEX: 40.33 KG/M2 | SYSTOLIC BLOOD PRESSURE: 134 MMHG

## 2019-03-18 DIAGNOSIS — I12.9 BENIGN HYPERTENSION WITH CHRONIC KIDNEY DISEASE, STAGE III: Primary | ICD-10-CM

## 2019-03-18 DIAGNOSIS — N18.30 BENIGN HYPERTENSION WITH CHRONIC KIDNEY DISEASE, STAGE III: Primary | ICD-10-CM

## 2019-03-18 DIAGNOSIS — Z85.3 HISTORY OF BREAST CANCER: ICD-10-CM

## 2019-03-18 DIAGNOSIS — K21.9 GASTROESOPHAGEAL REFLUX DISEASE WITHOUT ESOPHAGITIS: ICD-10-CM

## 2019-03-18 DIAGNOSIS — I70.0 ATHEROSCLEROSIS OF AORTA: ICD-10-CM

## 2019-03-18 DIAGNOSIS — E66.01 MORBID OBESITY WITH BMI OF 40.0-44.9, ADULT: ICD-10-CM

## 2019-03-18 DIAGNOSIS — E78.5 HYPERLIPIDEMIA, UNSPECIFIED HYPERLIPIDEMIA TYPE: ICD-10-CM

## 2019-03-18 DIAGNOSIS — R73.03 PREDIABETES: ICD-10-CM

## 2019-03-18 DIAGNOSIS — R19.5 HEME POSITIVE STOOL: ICD-10-CM

## 2019-03-18 DIAGNOSIS — Z23 NEED FOR SHINGLES VACCINE: ICD-10-CM

## 2019-03-18 PROCEDURE — 99215 PR OFFICE/OUTPT VISIT, EST, LEVL V, 40-54 MIN: ICD-10-PCS | Mod: S$PBB,,, | Performed by: INTERNAL MEDICINE

## 2019-03-18 PROCEDURE — 99999 PR PBB SHADOW E&M-EST. PATIENT-LVL III: ICD-10-PCS | Mod: PBBFAC,,, | Performed by: INTERNAL MEDICINE

## 2019-03-18 PROCEDURE — 99999 PR PBB SHADOW E&M-EST. PATIENT-LVL III: CPT | Mod: PBBFAC,,, | Performed by: INTERNAL MEDICINE

## 2019-03-18 PROCEDURE — 99213 OFFICE O/P EST LOW 20 MIN: CPT | Mod: PBBFAC,PO | Performed by: INTERNAL MEDICINE

## 2019-03-18 PROCEDURE — 99215 OFFICE O/P EST HI 40 MIN: CPT | Mod: S$PBB,,, | Performed by: INTERNAL MEDICINE

## 2019-03-18 RX ORDER — HYDROCHLOROTHIAZIDE 25 MG/1
25 TABLET ORAL DAILY
Qty: 90 TABLET | Refills: 1 | Status: SHIPPED | OUTPATIENT
Start: 2019-03-18 | End: 2019-09-30 | Stop reason: SDUPTHER

## 2019-03-18 RX ORDER — FENOFIBRATE 145 MG/1
145 TABLET, FILM COATED ORAL DAILY
Qty: 90 TABLET | Refills: 1 | Status: SHIPPED | OUTPATIENT
Start: 2019-03-18 | End: 2019-09-30 | Stop reason: SDUPTHER

## 2019-03-18 RX ORDER — PRAVASTATIN SODIUM 10 MG/1
10 TABLET ORAL DAILY
Qty: 90 TABLET | Refills: 1 | Status: SHIPPED | OUTPATIENT
Start: 2019-03-18 | End: 2019-10-01 | Stop reason: SDUPTHER

## 2019-03-18 RX ORDER — LISINOPRIL 40 MG/1
40 TABLET ORAL DAILY
Qty: 90 TABLET | Refills: 1 | Status: SHIPPED | OUTPATIENT
Start: 2019-03-18 | End: 2019-09-30 | Stop reason: SDUPTHER

## 2019-03-18 NOTE — PROGRESS NOTES
HISTORY OF PRESENT ILLNESS:  Francoise De Jesus is a 73 y.o. female who presents to the clinic today for Medication Refill  .   The patient presents to clinic today for follow-up of her hypertension complicated by chronic kidney disease stage 3, hyperlipidemia, and prediabetes.  She does not check blood pressures or blood sugars at home.  She reports fair dietary habits.  She stays active, but does not exercise.  She denies any problems with heartburn or reflux.  She has a history of breast cancer.  She is up-to-date on her mammograms  .  Patient did have a positive stool test last year.  She feels this was due to straining and bleeding hemorrhoids.  She has declined any further colon cancer screening.      PAST MEDICAL HISTORY:  Past Medical History:   Diagnosis Date    Arthritis     Atherosclerosis of aorta     noted on CXR 11/17/2014    Breast cancer 2011    stage I right breast cancer    Disorder of kidney and ureter     History of peptic ulcer     Hyperlipidemia     Hypertension     Macular degeneration 3/13/2017    Morbid obesity     Nuclear sclerosis of both eyes 4/21/2017    Prediabetes        PAST SURGICAL HISTORY:  Past Surgical History:   Procedure Laterality Date    BIOPSY-BREAST Excisional biopsy of the LEFT breast with wire localization Left 12/8/2014    Performed [facility] Aqu[facility] 2ND FLR    BREAST BIOPSY Left     BREAST LUMPECTOMY Right 11/2011    lumpectomy and SN biopsy with radiation    CATARACT EXTRACTION Left 07/22/2017    Dr. Okeefe    CATARACT EXTRACTION W/  INTRAOCULAR LENS IMPLANT Right 07/06/2017    Dr. Okeefe    CHOLECYSTECTOMY      EYE SURGERY      Lt cataract    INSERTION-INTRAOCULAR LENS (IOL) Right 7/6/2017    [facility]    INSERTION-INTRAOCULAR LENS (IOL) Left 6/22/2017    [facility]    PHACOEMULSIFICATION-ASPIRATION-CATARACT Right 7/6/2017    [facility] Gt GODINEZ  MD Maldonado at [facility] OR    PHACOEMULSIFICATION-ASPIRATION-CATARACT Left 6/22/2017    [facility] at [facility] OR       SOCIAL HISTORY:  Social History     Socioeconomic History    Marital status:      Spouse name: Not on file    Number of children: 2    Years of education: Not on file    Highest education level: Not on file   Social Needs    Financial resource strain: Not on file    Food insecurity - worry: Not on file    Food insecurity - inability: Not on file    Transportation needs - medical: Not on file    Transportation needs - non-medical: Not on file   Occupational History    Occupation:    Tobacco Use    Smoking status: Never Smoker    Smokeless tobacco: Never Used   Substance and Sexual Activity    Alcohol use: No    Drug use: No    Sexual activity: Yes     Partners: Male   Other Topics Concern    Not on file   Social History Narrative    Not on file       FAMILY HISTORY:  Family History   Problem Relation Age of Onset    Stomach cancer Mother     Lung cancer Father     Hypertension Father     Breast cancer Sister 58    Cataracts Sister     Macular degeneration Sister     Cancer Sister         breast CA    Macular degeneration Brother         wet    Hypertension Brother     Cataracts Sister     Macular degeneration Sister     Breast cancer Sister 72    Cancer Sister         breast CA     Diabetes Sister     Hypertension Sister     Macular degeneration Sister     Macular degeneration Sister     Macular degeneration Brother         dry    Hypertension Brother     Hypertension Brother     Diabetes Brother     Hypertension Brother     Prostate cancer Brother     Cancer Brother 80        prostate     Hypertension Brother     Hyperlipidemia Brother     No Known Problems Maternal Grandmother     No Known Problems Maternal Grandfather     No Known Problems Paternal Grandmother     No Known Problems Paternal Grandfather      Ovarian cancer Neg Hx     Amblyopia Neg Hx     Blindness Neg Hx     Glaucoma Neg Hx     Retinal detachment Neg Hx     Strabismus Neg Hx     Stroke Neg Hx     Thyroid disease Neg Hx        ALLERGIES AND MEDICATIONS: updated and reviewed.  Review of patient's allergies indicates:   Allergen Reactions    Medrol [methylprednisolone] Other (See Comments)     Depression/tearful    Penicillins      Other reaction(s): Rash     Medication List with Changes/Refills   Current Medications    ACETAMINOPHEN (TYLENOL) 500 MG TABLET    Take 500 mg by mouth every 6 (six) hours as needed for Pain.    ANTIOX#10/OM3/DHA/EPA/LUT/ZEAX (I-CAPS ORAL)    Take 1 tablet by mouth once daily.    ASPIRIN (ECOTRIN) 81 MG EC TABLET    Take 1 tablet (81 mg total) by mouth once daily.    IBUPROFEN (ADVIL,MOTRIN) 800 MG TABLET    Take 1 tablet (800 mg total) by mouth every 6 (six) hours as needed for Pain.    OMEPRAZOLE (PRILOSEC) 40 MG CAPSULE    Take 1 capsule (40 mg total) by mouth once daily.   Changed and/or Refilled Medications    Modified Medication Previous Medication    FENOFIBRATE (TRICOR) 145 MG TABLET fenofibrate (TRICOR) 145 MG tablet       Take 1 tablet (145 mg total) by mouth once daily. 1 Tablet Oral Every evening    Take 1 tablet (145 mg total) by mouth once daily. 1 Tablet Oral Every evening    HYDROCHLOROTHIAZIDE (HYDRODIURIL) 25 MG TABLET hydroCHLOROthiazide (HYDRODIURIL) 25 MG tablet       Take 1 tablet (25 mg total) by mouth once daily. 1 Tablet Oral Every morning    Take 1 tablet (25 mg total) by mouth once daily. 1 Tablet Oral Every morning    LISINOPRIL (PRINIVIL,ZESTRIL) 40 MG TABLET lisinopril (PRINIVIL,ZESTRIL) 40 MG tablet       Take 1 tablet (40 mg total) by mouth once daily. 1 Tablet Oral Every morning    Take 1 tablet (40 mg total) by mouth once daily. 1 Tablet Oral Every morning    PRAVASTATIN (PRAVACHOL) 10 MG TABLET pravastatin (PRAVACHOL) 10 MG tablet       Take 1 tablet (10 mg total) by mouth once daily.   "  Take 1 tablet (10 mg total) by mouth once daily.          CARE TEAM:  Patient Care Team:  Lulu Auguste MD as PCP - General (Internal Medicine)  Lulu Auguste MD as PCP - [facility] [place]  Kyler Hickman MD as Consulting Physician (Hematology and Oncology)  Wang Agarwal OD as Consulting Physician (Optometry)  Kyler Hickman MD as Consulting Physician (Hematology and Oncology)         REVIEW OF SYSTEMS:  Review of Systems   Constitutional: Negative for chills, fatigue, fever and unexpected weight change.        She has lost some weight because she has been nervous and eating less.   HENT: Negative for congestion and postnasal drip.    Eyes: Negative for pain and visual disturbance.   Respiratory: Negative for cough, shortness of breath and wheezing.    Cardiovascular: Negative for chest pain, palpitations and leg swelling.   Gastrointestinal: Negative for abdominal pain, constipation, diarrhea, nausea and vomiting.   Genitourinary: Negative for dysuria.   Musculoskeletal: Positive for arthralgias.   Skin: Negative for rash.   Neurological: Negative for weakness and headaches.   Psychiatric/Behavioral: Negative for dysphoric mood and sleep disturbance. The patient is nervous/anxious (- she is having some stress over concerns of her son).          PHYSICAL EXAM:   Vitals:    03/18/19 0923   BP: 134/70   Pulse: 70   Resp: 20   Temp: 97.6 °F (36.4 °C)     Weight: 96.9 kg (213 lb 10 oz)   Height: 5' 1" (154.9 cm)   Body mass index is 40.36 kg/m².     General appearance - alert, well appearing, and in no distress and morbidly obese  Mental status - alert, oriented to person, place, and time, normal mood, behavior, speech, dress, motor activity, and thought processes  Eyes - pupils equal and reactive, extraocular eye movements intact, sclera anicteric  Mouth - mucous membranes moist, pharynx normal without lesions  Neck - supple, no significant adenopathy, carotids upstroke normal bilaterally, no bruits, " thyroid exam: thyroid is normal in size without nodules or tenderness  Lymphatics - no palpable lymphadenopathy  Chest - clear to auscultation, no wheezes, rales or rhonchi, symmetric air entry  Heart - normal rate and regular rhythm, no gallops noted  Abdomen - soft, nontender, nondistended, no masses or organomegaly  Rectal - not performed  Neurological - alert, oriented, normal speech, no focal findings or movement disorder noted, cranial nerves II through XII intact  Musculoskeletal - no muscular tenderness noted, Moderate osteoarthritic changes noted to both knee joints. No joint effusions noted.   Extremities - no pedal edema noted  Skin - normal coloration and turgor, no rashes, no suspicious skin lesions noted      ASSESSMENT AND PLAN:  1. Benign hypertension with chronic kidney disease, stage III  Discussed sodium restriction, maintaining ideal body weight and regular exercise program as physiologic means to achieve blood pressure control. The patient will strive towards this. The current medical regimen is effective;  continue present plan and medications. Recommended patient to check home readings to monitor and see me for followup as scheduled or sooner as needed. Patient was educated that both decongestant and anti-inflammatory medication may raise blood pressure. Stable decreased kidney function. Observe. Patient counseled to avoid/minimize the use of anti-inflammatory  Medication. Discussed to stay well hydrated. Also discussed with patient that good control of blood pressure and/or diabetes, if present, will help to prevent progression.   - lisinopril (PRINIVIL,ZESTRIL) 40 MG tablet; Take 1 tablet (40 mg total) by mouth once daily. 1 Tablet Oral Every morning  Dispense: 90 tablet; Refill: 1  - hydroCHLOROthiazide (HYDRODIURIL) 25 MG tablet; Take 1 tablet (25 mg total) by mouth once daily. 1 Tablet Oral Every morning  Dispense: 90 tablet; Refill: 1  - CBC auto differential; Future    2.  Hyperlipidemia, unspecified hyperlipidemia type  We discussed low fat diet and regular exercise.The current medical regimen is effective;  continue present plan and medications.    - fenofibrate (TRICOR) 145 MG tablet; Take 1 tablet (145 mg total) by mouth once daily. 1 Tablet Oral Every evening  Dispense: 90 tablet; Refill: 1  - pravastatin (PRAVACHOL) 10 MG tablet; Take 1 tablet (10 mg total) by mouth once daily.  Dispense: 90 tablet; Refill: 1  - Comprehensive metabolic panel; Future  - Lipid panel; Future    3. Prediabetes  Stable. We discussed low sugar/low carbohydrate diet and regular exercise to prevent progression. No need for prescription medication at this time.   - Hemoglobin A1c; Future    4. Atherosclerosis of aorta  Patient with Atherosclerosis of the Aorta.  Stable/asymptomatic. Currently stable on lipid lowering medication and b/p monitoring.     5. Gastroesophageal reflux disease without esophagitis  Symptoms controlled: yes. Reflux precautions discussed (eliminate tobacco if a smoker; minimize caffeine, chocolate and red/white peppermint intake; avoid heavy and spicy meals; don't lay down within 2-3 hours after eating; minimize the intake of NSAIDs). Medication as needed. Patient asked to take medication breaks, if possible - discussed chronic use can limit calcium absorption (which can lead to osteopenia/osteoporosis), increases the risk for intestinal infections, and can cause kidney damage. There are also some newer studies that show possible increased risk of mortality.     6. History of breast cancer  Stable. Up to date on mammogram.    7. Morbid obesity with BMI of 40.0-44.9, adult  The patient is asked to continue to make an attempt to improve diet and exercise patterns to aid in medical management of this problem.     8. Need for shingles vaccine  Patient was advised to get immunization at the pharmacy.     9. Heme positive stool  I had a long discussion today with the patient regarding my  concerns.  Even if her hemorrhoids or bleeding she could still have polyps or other concerns that could have caused her fit kit to be positive last year.  We cannot do any further colon cancer screening with fit kit.  She declines colonoscopy.  We discussed referral to GI to discuss this further.  She declines at this time.  She states she will discuss with her  and get back to me regarding her decision.           Follow-up in about 6 months (around 9/18/2019), or if symptoms worsen or fail to improve, for annual exam. or sooner as needed.

## 2019-03-31 ENCOUNTER — EXTERNAL CHRONIC CARE MANAGEMENT (OUTPATIENT)
Dept: PRIMARY CARE CLINIC | Facility: CLINIC | Age: 74
End: 2019-03-31
Payer: MEDICARE

## 2019-03-31 PROCEDURE — 99490 CHRNC CARE MGMT STAFF 1ST 20: CPT | Mod: S$PBB,,, | Performed by: INTERNAL MEDICINE

## 2019-03-31 PROCEDURE — 99490 PR CHRONIC CARE MGMT, 1ST 20 MIN: ICD-10-PCS | Mod: S$PBB,,, | Performed by: INTERNAL MEDICINE

## 2019-03-31 PROCEDURE — 99490 CHRNC CARE MGMT STAFF 1ST 20: CPT | Mod: PBBFAC,PO | Performed by: INTERNAL MEDICINE

## 2019-04-03 ENCOUNTER — TELEPHONE (OUTPATIENT)
Dept: FAMILY MEDICINE | Facility: CLINIC | Age: 74
End: 2019-04-03

## 2019-04-03 ENCOUNTER — LAB VISIT (OUTPATIENT)
Dept: LAB | Facility: HOSPITAL | Age: 74
End: 2019-04-03
Attending: INTERNAL MEDICINE
Payer: MEDICARE

## 2019-04-03 DIAGNOSIS — I12.9 BENIGN HYPERTENSION WITH CHRONIC KIDNEY DISEASE, STAGE III: ICD-10-CM

## 2019-04-03 DIAGNOSIS — E78.5 HYPERLIPIDEMIA, UNSPECIFIED HYPERLIPIDEMIA TYPE: ICD-10-CM

## 2019-04-03 DIAGNOSIS — R73.03 PREDIABETES: ICD-10-CM

## 2019-04-03 DIAGNOSIS — N18.30 BENIGN HYPERTENSION WITH CHRONIC KIDNEY DISEASE, STAGE III: ICD-10-CM

## 2019-04-03 LAB
ALBUMIN SERPL BCP-MCNC: 3.6 G/DL (ref 3.5–5.2)
ALP SERPL-CCNC: 68 U/L (ref 55–135)
ALT SERPL W/O P-5'-P-CCNC: 10 U/L (ref 10–44)
ANION GAP SERPL CALC-SCNC: 9 MMOL/L (ref 8–16)
AST SERPL-CCNC: 18 U/L (ref 10–40)
BASOPHILS # BLD AUTO: 0.04 K/UL (ref 0–0.2)
BASOPHILS NFR BLD: 0.5 % (ref 0–1.9)
BILIRUB SERPL-MCNC: 0.4 MG/DL (ref 0.1–1)
BUN SERPL-MCNC: 36 MG/DL (ref 8–23)
CALCIUM SERPL-MCNC: 9.2 MG/DL (ref 8.7–10.5)
CHLORIDE SERPL-SCNC: 108 MMOL/L (ref 95–110)
CHOLEST SERPL-MCNC: 150 MG/DL (ref 120–199)
CHOLEST/HDLC SERPL: 2.8 {RATIO} (ref 2–5)
CO2 SERPL-SCNC: 27 MMOL/L (ref 23–29)
CREAT SERPL-MCNC: 0.9 MG/DL (ref 0.5–1.4)
DIFFERENTIAL METHOD: ABNORMAL
EOSINOPHIL # BLD AUTO: 0.2 K/UL (ref 0–0.5)
EOSINOPHIL NFR BLD: 2.1 % (ref 0–8)
ERYTHROCYTE [DISTWIDTH] IN BLOOD BY AUTOMATED COUNT: 13.2 % (ref 11.5–14.5)
EST. GFR  (AFRICAN AMERICAN): >60 ML/MIN/1.73 M^2
EST. GFR  (NON AFRICAN AMERICAN): >60 ML/MIN/1.73 M^2
ESTIMATED AVG GLUCOSE: 114 MG/DL (ref 68–131)
GLUCOSE SERPL-MCNC: 107 MG/DL (ref 70–110)
HBA1C MFR BLD HPLC: 5.6 % (ref 4–5.6)
HCT VFR BLD AUTO: 36.4 % (ref 37–48.5)
HDLC SERPL-MCNC: 54 MG/DL (ref 40–75)
HDLC SERPL: 36 % (ref 20–50)
HGB BLD-MCNC: 11.5 G/DL (ref 12–16)
IMM GRANULOCYTES # BLD AUTO: 0.02 K/UL (ref 0–0.04)
IMM GRANULOCYTES NFR BLD AUTO: 0.3 % (ref 0–0.5)
LDLC SERPL CALC-MCNC: 82 MG/DL (ref 63–159)
LYMPHOCYTES # BLD AUTO: 2 K/UL (ref 1–4.8)
LYMPHOCYTES NFR BLD: 24.7 % (ref 18–48)
MCH RBC QN AUTO: 30 PG (ref 27–31)
MCHC RBC AUTO-ENTMCNC: 31.6 G/DL (ref 32–36)
MCV RBC AUTO: 95 FL (ref 82–98)
MONOCYTES # BLD AUTO: 0.6 K/UL (ref 0.3–1)
MONOCYTES NFR BLD: 7.3 % (ref 4–15)
NEUTROPHILS # BLD AUTO: 5.2 K/UL (ref 1.8–7.7)
NEUTROPHILS NFR BLD: 65.1 % (ref 38–73)
NONHDLC SERPL-MCNC: 96 MG/DL
NRBC BLD-RTO: 0 /100 WBC
PLATELET # BLD AUTO: 218 K/UL (ref 150–350)
PMV BLD AUTO: 10.9 FL (ref 9.2–12.9)
POTASSIUM SERPL-SCNC: 4.4 MMOL/L (ref 3.5–5.1)
PROT SERPL-MCNC: 7.4 G/DL (ref 6–8.4)
RBC # BLD AUTO: 3.83 M/UL (ref 4–5.4)
SODIUM SERPL-SCNC: 144 MMOL/L (ref 136–145)
TRIGL SERPL-MCNC: 70 MG/DL (ref 30–150)
WBC # BLD AUTO: 7.96 K/UL (ref 3.9–12.7)

## 2019-04-03 PROCEDURE — 83036 HEMOGLOBIN GLYCOSYLATED A1C: CPT

## 2019-04-03 PROCEDURE — 36415 COLL VENOUS BLD VENIPUNCTURE: CPT | Mod: PO

## 2019-04-03 PROCEDURE — 80053 COMPREHEN METABOLIC PANEL: CPT

## 2019-04-03 PROCEDURE — 85025 COMPLETE CBC W/AUTO DIFF WBC: CPT

## 2019-04-03 PROCEDURE — 80061 LIPID PANEL: CPT

## 2019-04-03 NOTE — TELEPHONE ENCOUNTER
Spoke with patient by phone and advised her all of her lab results looked okay except for the fact that her blood count has dropped slightly.  I advised the patient that this result, along with the fact that her fit kit was positive last year, makes me again feel that there is a need for her to do a colonoscopy.  She declines.  She states she will discuss it further with her  and let me know her decision.

## 2019-04-30 ENCOUNTER — EXTERNAL CHRONIC CARE MANAGEMENT (OUTPATIENT)
Dept: PRIMARY CARE CLINIC | Facility: CLINIC | Age: 74
End: 2019-04-30
Payer: MEDICARE

## 2019-04-30 PROCEDURE — 99490 PR CHRONIC CARE MGMT, 1ST 20 MIN: ICD-10-PCS | Mod: S$PBB,,, | Performed by: INTERNAL MEDICINE

## 2019-04-30 PROCEDURE — 99490 CHRNC CARE MGMT STAFF 1ST 20: CPT | Mod: S$PBB,,, | Performed by: INTERNAL MEDICINE

## 2019-04-30 PROCEDURE — 99490 CHRNC CARE MGMT STAFF 1ST 20: CPT | Mod: PBBFAC,PO | Performed by: INTERNAL MEDICINE

## 2019-05-17 ENCOUNTER — PES CALL (OUTPATIENT)
Dept: ADMINISTRATIVE | Facility: CLINIC | Age: 74
End: 2019-05-17

## 2019-05-31 ENCOUNTER — EXTERNAL CHRONIC CARE MANAGEMENT (OUTPATIENT)
Dept: PRIMARY CARE CLINIC | Facility: CLINIC | Age: 74
End: 2019-05-31
Payer: MEDICARE

## 2019-05-31 PROCEDURE — 99490 CHRNC CARE MGMT STAFF 1ST 20: CPT | Mod: S$PBB,,, | Performed by: INTERNAL MEDICINE

## 2019-05-31 PROCEDURE — 99490 CHRNC CARE MGMT STAFF 1ST 20: CPT | Mod: PBBFAC,PO | Performed by: INTERNAL MEDICINE

## 2019-05-31 PROCEDURE — 99490 PR CHRONIC CARE MGMT, 1ST 20 MIN: ICD-10-PCS | Mod: S$PBB,,, | Performed by: INTERNAL MEDICINE

## 2019-06-13 ENCOUNTER — PES CALL (OUTPATIENT)
Dept: ADMINISTRATIVE | Facility: CLINIC | Age: 74
End: 2019-06-13

## 2019-06-30 ENCOUNTER — EXTERNAL CHRONIC CARE MANAGEMENT (OUTPATIENT)
Dept: PRIMARY CARE CLINIC | Facility: CLINIC | Age: 74
End: 2019-06-30
Payer: MEDICARE

## 2019-06-30 PROCEDURE — 99490 PR CHRONIC CARE MGMT, 1ST 20 MIN: ICD-10-PCS | Mod: S$PBB,,, | Performed by: INTERNAL MEDICINE

## 2019-06-30 PROCEDURE — 99490 CHRNC CARE MGMT STAFF 1ST 20: CPT | Mod: PBBFAC,PO | Performed by: INTERNAL MEDICINE

## 2019-06-30 PROCEDURE — 99490 CHRNC CARE MGMT STAFF 1ST 20: CPT | Mod: S$PBB,,, | Performed by: INTERNAL MEDICINE

## 2019-07-31 ENCOUNTER — EXTERNAL CHRONIC CARE MANAGEMENT (OUTPATIENT)
Dept: PRIMARY CARE CLINIC | Facility: CLINIC | Age: 74
End: 2019-07-31
Payer: MEDICARE

## 2019-07-31 PROCEDURE — 99490 CHRNC CARE MGMT STAFF 1ST 20: CPT | Mod: S$PBB,,, | Performed by: INTERNAL MEDICINE

## 2019-07-31 PROCEDURE — 99490 CHRNC CARE MGMT STAFF 1ST 20: CPT | Mod: PBBFAC,PO | Performed by: INTERNAL MEDICINE

## 2019-07-31 PROCEDURE — 99490 PR CHRONIC CARE MGMT, 1ST 20 MIN: ICD-10-PCS | Mod: S$PBB,,, | Performed by: INTERNAL MEDICINE

## 2019-08-21 ENCOUNTER — TELEPHONE (OUTPATIENT)
Dept: HOME HEALTH SERVICES | Facility: CLINIC | Age: 74
End: 2019-08-21

## 2019-08-31 ENCOUNTER — EXTERNAL CHRONIC CARE MANAGEMENT (OUTPATIENT)
Dept: PRIMARY CARE CLINIC | Facility: CLINIC | Age: 74
End: 2019-08-31
Payer: MEDICARE

## 2019-08-31 PROCEDURE — 99490 PR CHRONIC CARE MGMT, 1ST 20 MIN: ICD-10-PCS | Mod: S$PBB,,, | Performed by: INTERNAL MEDICINE

## 2019-08-31 PROCEDURE — 99490 CHRNC CARE MGMT STAFF 1ST 20: CPT | Mod: S$PBB,,, | Performed by: INTERNAL MEDICINE

## 2019-08-31 PROCEDURE — 99490 CHRNC CARE MGMT STAFF 1ST 20: CPT | Mod: PBBFAC,PO | Performed by: INTERNAL MEDICINE

## 2019-09-30 ENCOUNTER — EXTERNAL CHRONIC CARE MANAGEMENT (OUTPATIENT)
Dept: PRIMARY CARE CLINIC | Facility: CLINIC | Age: 74
End: 2019-09-30
Payer: MEDICARE

## 2019-09-30 DIAGNOSIS — I12.9 BENIGN HYPERTENSION WITH CHRONIC KIDNEY DISEASE, STAGE III: ICD-10-CM

## 2019-09-30 DIAGNOSIS — E78.5 HYPERLIPIDEMIA, UNSPECIFIED HYPERLIPIDEMIA TYPE: ICD-10-CM

## 2019-09-30 DIAGNOSIS — N18.30 BENIGN HYPERTENSION WITH CHRONIC KIDNEY DISEASE, STAGE III: ICD-10-CM

## 2019-09-30 PROCEDURE — 99490 PR CHRONIC CARE MGMT, 1ST 20 MIN: ICD-10-PCS | Mod: S$PBB,,, | Performed by: INTERNAL MEDICINE

## 2019-09-30 PROCEDURE — 99490 CHRNC CARE MGMT STAFF 1ST 20: CPT | Mod: S$PBB,,, | Performed by: INTERNAL MEDICINE

## 2019-09-30 PROCEDURE — 99490 CHRNC CARE MGMT STAFF 1ST 20: CPT | Mod: PBBFAC,PO | Performed by: INTERNAL MEDICINE

## 2019-10-01 DIAGNOSIS — N18.30 BENIGN HYPERTENSION WITH CHRONIC KIDNEY DISEASE, STAGE III: ICD-10-CM

## 2019-10-01 DIAGNOSIS — I12.9 BENIGN HYPERTENSION WITH CHRONIC KIDNEY DISEASE, STAGE III: ICD-10-CM

## 2019-10-01 RX ORDER — FENOFIBRATE 145 MG/1
145 TABLET, FILM COATED ORAL DAILY
Qty: 90 TABLET | Refills: 0 | Status: SHIPPED | OUTPATIENT
Start: 2019-10-01 | End: 2020-01-03 | Stop reason: SDUPTHER

## 2019-10-01 RX ORDER — LISINOPRIL 40 MG/1
40 TABLET ORAL DAILY
Qty: 90 TABLET | Refills: 0 | Status: SHIPPED | OUTPATIENT
Start: 2019-10-01 | End: 2020-01-03 | Stop reason: SDUPTHER

## 2019-10-01 RX ORDER — PRAVASTATIN SODIUM 10 MG/1
10 TABLET ORAL DAILY
Qty: 90 TABLET | Refills: 0 | Status: SHIPPED | OUTPATIENT
Start: 2019-10-01 | End: 2020-01-03 | Stop reason: SDUPTHER

## 2019-10-01 RX ORDER — HYDROCHLOROTHIAZIDE 25 MG/1
25 TABLET ORAL DAILY
Qty: 90 TABLET | Refills: 0 | Status: SHIPPED | OUTPATIENT
Start: 2019-10-01 | End: 2020-01-03 | Stop reason: SDUPTHER

## 2019-10-01 RX ORDER — LISINOPRIL 40 MG/1
40 TABLET ORAL DAILY
Qty: 90 TABLET | Refills: 0 | Status: SHIPPED | OUTPATIENT
Start: 2019-10-01 | End: 2019-10-01 | Stop reason: SDUPTHER

## 2019-10-01 NOTE — TELEPHONE ENCOUNTER
Pt. Was notified of her other medication be refilled and she stated she also needed her Lisinopril refilled. Pt. Made appt. For 1/3/2020

## 2019-10-01 NOTE — TELEPHONE ENCOUNTER
Pt. Notified of medication refill, and appt. Needed. Appt. Made for 1/3/2020. APPT. Letter put in the mail.

## 2019-10-31 ENCOUNTER — EXTERNAL CHRONIC CARE MANAGEMENT (OUTPATIENT)
Dept: PRIMARY CARE CLINIC | Facility: CLINIC | Age: 74
End: 2019-10-31
Payer: MEDICARE

## 2019-10-31 PROCEDURE — 99490 CHRNC CARE MGMT STAFF 1ST 20: CPT | Mod: S$PBB,,, | Performed by: INTERNAL MEDICINE

## 2019-10-31 PROCEDURE — 99490 CHRNC CARE MGMT STAFF 1ST 20: CPT | Mod: PBBFAC,PO | Performed by: INTERNAL MEDICINE

## 2019-10-31 PROCEDURE — 99490 PR CHRONIC CARE MGMT, 1ST 20 MIN: ICD-10-PCS | Mod: S$PBB,,, | Performed by: INTERNAL MEDICINE

## 2019-11-12 ENCOUNTER — PES CALL (OUTPATIENT)
Dept: ADMINISTRATIVE | Facility: CLINIC | Age: 74
End: 2019-11-12

## 2019-11-30 ENCOUNTER — EXTERNAL CHRONIC CARE MANAGEMENT (OUTPATIENT)
Dept: PRIMARY CARE CLINIC | Facility: CLINIC | Age: 74
End: 2019-11-30
Payer: MEDICARE

## 2019-11-30 PROCEDURE — 99490 PR CHRONIC CARE MGMT, 1ST 20 MIN: ICD-10-PCS | Mod: S$PBB,,, | Performed by: INTERNAL MEDICINE

## 2019-11-30 PROCEDURE — 99490 CHRNC CARE MGMT STAFF 1ST 20: CPT | Mod: PBBFAC,PO | Performed by: INTERNAL MEDICINE

## 2019-11-30 PROCEDURE — 99490 CHRNC CARE MGMT STAFF 1ST 20: CPT | Mod: S$PBB,,, | Performed by: INTERNAL MEDICINE

## 2019-12-20 ENCOUNTER — OFFICE VISIT (OUTPATIENT)
Dept: FAMILY MEDICINE | Facility: CLINIC | Age: 74
End: 2019-12-20
Payer: MEDICARE

## 2019-12-20 VITALS
SYSTOLIC BLOOD PRESSURE: 130 MMHG | TEMPERATURE: 98 F | OXYGEN SATURATION: 99 % | DIASTOLIC BLOOD PRESSURE: 68 MMHG | BODY MASS INDEX: 42 KG/M2 | HEIGHT: 61 IN | HEART RATE: 100 BPM | WEIGHT: 222.44 LBS

## 2019-12-20 DIAGNOSIS — E66.01 MORBID OBESITY WITH BMI OF 40.0-44.9, ADULT: ICD-10-CM

## 2019-12-20 DIAGNOSIS — I12.9 BENIGN HYPERTENSION WITH CHRONIC KIDNEY DISEASE, STAGE III: ICD-10-CM

## 2019-12-20 DIAGNOSIS — Z00.00 ENCOUNTER FOR PREVENTIVE HEALTH EXAMINATION: Primary | ICD-10-CM

## 2019-12-20 DIAGNOSIS — N18.30 BENIGN HYPERTENSION WITH CHRONIC KIDNEY DISEASE, STAGE III: ICD-10-CM

## 2019-12-20 DIAGNOSIS — I70.0 ATHEROSCLEROSIS OF AORTA: ICD-10-CM

## 2019-12-20 DIAGNOSIS — Z85.3 HISTORY OF BREAST CANCER: ICD-10-CM

## 2019-12-20 DIAGNOSIS — N18.30 CKD (CHRONIC KIDNEY DISEASE) STAGE 3, GFR 30-59 ML/MIN: ICD-10-CM

## 2019-12-20 PROCEDURE — G0439 PPPS, SUBSEQ VISIT: HCPCS | Mod: S$GLB,,, | Performed by: NURSE PRACTITIONER

## 2019-12-20 PROCEDURE — G0439 PR MEDICARE ANNUAL WELLNESS SUBSEQUENT VISIT: ICD-10-PCS | Mod: S$GLB,,, | Performed by: NURSE PRACTITIONER

## 2019-12-20 PROCEDURE — 99999 PR PBB SHADOW E&M-EST. PATIENT-LVL V: CPT | Mod: PBBFAC,,, | Performed by: NURSE PRACTITIONER

## 2019-12-20 PROCEDURE — 99215 OFFICE O/P EST HI 40 MIN: CPT | Mod: PBBFAC,PO | Performed by: NURSE PRACTITIONER

## 2019-12-20 PROCEDURE — 99999 PR PBB SHADOW E&M-EST. PATIENT-LVL V: ICD-10-PCS | Mod: PBBFAC,,, | Performed by: NURSE PRACTITIONER

## 2019-12-20 NOTE — PATIENT INSTRUCTIONS
Counseling and Referral of Other Preventative  (Italic type indicates deductible and co-insurance are waived)    Patient Name: Francoise De Jesus  Today's Date: 12/20/2019    Health Maintenance       Date Due Completion Date    Shingles Vaccine (1 of 2) 08/28/1994 ---    Mammogram 12/10/2019 12/10/2018    Hemoglobin A1c 04/03/2020 4/3/2019    High Dose Statin 10/01/2020 10/1/2019    Lipid Panel 04/03/2024 4/3/2019    Colonoscopy 06/10/2024 6/10/2014 (Declined)    Override on 6/10/2014: Declined    TETANUS VACCINE 07/24/2028 7/24/2018 (Declined)    Override on 7/24/2018: Declined        No orders of the defined types were placed in this encounter.    The following information is provided to all patients.  This information is to help you find resources for any of the problems found today that may be affecting your health:                Living healthy guide: www.[facility].[place].[facility]      Understanding Diabetes: www.diabetes.org      Eating healthy: www.cdc.gov/healthyweight      CDC home safety checklist: www.cdc.gov/steadi/patient.html      Agency on Aging: www.goea.[place].[facility]      Alcoholics anonymous (AA): www.aa.org      Physical Activity: www.andrea.nih.gov/cj4wazz      Tobacco use: www.quitwithusla.org

## 2019-12-20 NOTE — PROGRESS NOTES
"Francoise De Jesus presented for a  Medicare AWV and comprehensive Health Risk Assessment today. The following components were reviewed and updated:    · Medical history  · Family History  · Social history  · Allergies and Current Medications  · Health Risk Assessment  · Health Maintenance  · Care Team     ** See Completed Assessments for Annual Wellness Visit within the encounter summary.**       The following assessments were completed:  · Living Situation  · CAGE  · Depression Screening  · Timed Get Up and Go  · Whisper Test  · Cognitive Function Screening  · Nutrition Screening  · ADL Screening  · PAQ Screening    Vitals:    12/20/19 1209   BP: 130/68   BP Location: Left arm   Patient Position: Sitting   BP Method: Large (Manual)   Pulse: 100   Temp: 97.6 °F (36.4 °C)   TempSrc: Oral   SpO2: 99%   Weight: 100.9 kg (222 lb 7.1 oz)   Height: 5' 1" (1.549 m)     Body mass index is 42.03 kg/m².  Physical Exam   Constitutional: She is oriented to person, place, and time. She appears well-developed and well-nourished.   HENT:   Head: Normocephalic and atraumatic.   Cardiovascular: Normal rate, regular rhythm and normal heart sounds.   Pulmonary/Chest: Effort normal and breath sounds normal. No stridor. No respiratory distress. She has no decreased breath sounds. She has no wheezes. She has no rhonchi. She has no rales.   Neurological: She is alert and oriented to person, place, and time.   Skin: She is not diaphoretic. No pallor.   Psychiatric: She has a normal mood and affect. Her speech is normal and behavior is normal.           Diagnoses and health risks identified today and associated recommendations/orders:    1. Encounter for preventive health examination    2. Benign hypertension with chronic kidney disease, stage III    3. CKD (chronic kidney disease) stage 3, GFR 30-59 ml/min    4. Atherosclerosis of aorta    5. Morbid obesity with BMI of 40.0-44.9, adult    6. History of breast cancer  - Mammo Digital Diagnostic " Bilat w/ Kalpesh; Future    Problem List Items Addressed This Visit     Morbid obesity with BMI of 40.0-44.9, adult    Current Assessment & Plan     The patient is asked to make an attempt to improve diet and exercise patterns to aid in medical management of this problem.           History of breast cancer    Overview     11/2011  S/p right lumpectomy and radiation for a 1.3 cm lobular carcinoma that was ER strongly positive, [place] positive, and HER-2 negative. The Oncotype DX score was 10, which indicates a 7% chance of recurrence within 10 years with tamoxifen alone.   Followed by oncology, Dr. Hickman  Stopped Arimidex 4/2017 after taking for 5 years           Relevant Orders    Mammo Digital Diagnostic Bilat w/ Kalpesh    CKD (chronic kidney disease) stage 3, GFR 30-59 ml/min    Current Assessment & Plan     Stable. Continue to monitor           Benign hypertension with chronic kidney disease, stage III    Current Assessment & Plan     Stable. Continue to monitor         Atherosclerosis of aorta    Overview     noted on CXR 11/17/2014         Current Assessment & Plan     Stable. Continue to monitor           Other Visit Diagnoses     Encounter for preventive health examination    -  Primary          Diagnostic mammogram ordered. She will check with insurance company for shingrix co-pay questions.     Provided Francoise with a 5-10 year written screening schedule and personal prevention plan. Recommendations were developed using the USPSTF age appropriate recommendations. Education, counseling, and referrals were provided as needed. After Visit Summary printed and given to patient which includes a list of additional screenings\tests needed.    Follow up in about 1 year (around 12/20/2020).    Chandana Birch, NAMRATAP-C  I offered to discuss end of life issues, including information on how to make advance directives that the patient could use to name someone who would make medical decisions on their behalf if they became too  ill to make themselves.    ___Patient declined  _X_Patient is interested, I provided paper work and offered to discuss.

## 2020-01-03 ENCOUNTER — OFFICE VISIT (OUTPATIENT)
Dept: FAMILY MEDICINE | Facility: CLINIC | Age: 75
End: 2020-01-03
Payer: MEDICARE

## 2020-01-03 VITALS
BODY MASS INDEX: 42.19 KG/M2 | WEIGHT: 223.44 LBS | HEART RATE: 90 BPM | DIASTOLIC BLOOD PRESSURE: 50 MMHG | HEIGHT: 61 IN | OXYGEN SATURATION: 99 % | TEMPERATURE: 98 F | SYSTOLIC BLOOD PRESSURE: 122 MMHG

## 2020-01-03 DIAGNOSIS — Z23 NEED FOR TDAP VACCINATION: ICD-10-CM

## 2020-01-03 DIAGNOSIS — K21.9 GASTROESOPHAGEAL REFLUX DISEASE WITHOUT ESOPHAGITIS: ICD-10-CM

## 2020-01-03 DIAGNOSIS — Z23 NEED FOR SHINGLES VACCINE: ICD-10-CM

## 2020-01-03 DIAGNOSIS — M54.50 CHRONIC LEFT-SIDED LOW BACK PAIN WITHOUT SCIATICA: ICD-10-CM

## 2020-01-03 DIAGNOSIS — N18.30 BENIGN HYPERTENSION WITH CHRONIC KIDNEY DISEASE, STAGE III: Primary | ICD-10-CM

## 2020-01-03 DIAGNOSIS — Z12.31 ENCOUNTER FOR SCREENING MAMMOGRAM FOR BREAST CANCER: ICD-10-CM

## 2020-01-03 DIAGNOSIS — E78.49 OTHER HYPERLIPIDEMIA: ICD-10-CM

## 2020-01-03 DIAGNOSIS — G89.29 CHRONIC LEFT-SIDED LOW BACK PAIN WITHOUT SCIATICA: ICD-10-CM

## 2020-01-03 DIAGNOSIS — I12.9 BENIGN HYPERTENSION WITH CHRONIC KIDNEY DISEASE, STAGE III: Primary | ICD-10-CM

## 2020-01-03 DIAGNOSIS — I70.0 ATHEROSCLEROSIS OF AORTA: ICD-10-CM

## 2020-01-03 DIAGNOSIS — R73.03 PREDIABETES: ICD-10-CM

## 2020-01-03 DIAGNOSIS — Z71.89 ADVANCED DIRECTIVES, COUNSELING/DISCUSSION: ICD-10-CM

## 2020-01-03 DIAGNOSIS — R14.2 BELCHING: ICD-10-CM

## 2020-01-03 DIAGNOSIS — E66.01 MORBID OBESITY WITH BMI OF 40.0-44.9, ADULT: ICD-10-CM

## 2020-01-03 DIAGNOSIS — Z12.11 COLON CANCER SCREENING: ICD-10-CM

## 2020-01-03 PROCEDURE — 1126F PR PAIN SEVERITY QUANTIFIED, NO PAIN PRESENT: ICD-10-PCS | Mod: ,,, | Performed by: INTERNAL MEDICINE

## 2020-01-03 PROCEDURE — 99213 OFFICE O/P EST LOW 20 MIN: CPT | Mod: PBBFAC,PO | Performed by: INTERNAL MEDICINE

## 2020-01-03 PROCEDURE — 99214 PR OFFICE/OUTPT VISIT, EST, LEVL IV, 30-39 MIN: ICD-10-PCS | Mod: S$PBB,,, | Performed by: INTERNAL MEDICINE

## 2020-01-03 PROCEDURE — 1126F AMNT PAIN NOTED NONE PRSNT: CPT | Mod: ,,, | Performed by: INTERNAL MEDICINE

## 2020-01-03 PROCEDURE — 99999 PR PBB SHADOW E&M-EST. PATIENT-LVL III: CPT | Mod: PBBFAC,,, | Performed by: INTERNAL MEDICINE

## 2020-01-03 PROCEDURE — 1159F MED LIST DOCD IN RCRD: CPT | Mod: ,,, | Performed by: INTERNAL MEDICINE

## 2020-01-03 PROCEDURE — 99999 PR PBB SHADOW E&M-EST. PATIENT-LVL III: ICD-10-PCS | Mod: PBBFAC,,, | Performed by: INTERNAL MEDICINE

## 2020-01-03 PROCEDURE — 1159F PR MEDICATION LIST DOCUMENTED IN MEDICAL RECORD: ICD-10-PCS | Mod: ,,, | Performed by: INTERNAL MEDICINE

## 2020-01-03 PROCEDURE — 99214 OFFICE O/P EST MOD 30 MIN: CPT | Mod: S$PBB,,, | Performed by: INTERNAL MEDICINE

## 2020-01-03 RX ORDER — OMEPRAZOLE 40 MG/1
40 CAPSULE, DELAYED RELEASE ORAL DAILY
Qty: 90 CAPSULE | Refills: 0 | Status: SHIPPED | OUTPATIENT
Start: 2020-01-03 | End: 2022-02-14 | Stop reason: SDUPTHER

## 2020-01-03 RX ORDER — LISINOPRIL 40 MG/1
40 TABLET ORAL DAILY
Qty: 90 TABLET | Refills: 3 | Status: SHIPPED | OUTPATIENT
Start: 2020-01-03 | End: 2021-01-11 | Stop reason: SDUPTHER

## 2020-01-03 RX ORDER — FENOFIBRATE 145 MG/1
145 TABLET, FILM COATED ORAL DAILY
Qty: 90 TABLET | Refills: 3 | Status: SHIPPED | OUTPATIENT
Start: 2020-01-03 | End: 2021-01-11 | Stop reason: SDUPTHER

## 2020-01-03 RX ORDER — PRAVASTATIN SODIUM 10 MG/1
10 TABLET ORAL DAILY
Qty: 90 TABLET | Refills: 3 | Status: SHIPPED | OUTPATIENT
Start: 2020-01-03 | End: 2021-01-11 | Stop reason: SDUPTHER

## 2020-01-03 RX ORDER — HYDROCHLOROTHIAZIDE 25 MG/1
25 TABLET ORAL DAILY
Qty: 90 TABLET | Refills: 3 | Status: SHIPPED | OUTPATIENT
Start: 2020-01-03 | End: 2021-01-11 | Stop reason: SDUPTHER

## 2020-01-03 NOTE — PROGRESS NOTES
HISTORY OF PRESENT ILLNESS:  Francoise De Jesus is a 74 y.o. female who presents to the clinic today for Annual Exam and Medication Refill  .   The patient presents to clinic today for follow-up of her hypertension complicated by chronic kidney disease stage 3, hyperlipidemia, and prediabetes.  The patient reports that her blood pressures at home are well controlled.  She denies cardiac chest pain or shortness of breath.  She has mild intermittent chronic lower extremity edema. She states she has stopped drinking soft drinks and has lost a few lb.  It has also helped her reflux.  She takes omeprazole only as needed.   Her primary complaint today is left-sided low back pain.  Her back primarily hurts her when she stands for too long.  It is relieved with sitting down and resting.  She denies any trauma or unusual activity.  She does not really take anything for pain at home.  She does not do any specific exercises to help with her back pain.      PAST MEDICAL HISTORY:  Past Medical History:   Diagnosis Date    Arthritis     Atherosclerosis of aorta     noted on CXR 11/17/2014    Breast cancer 2011    stage I right breast cancer    Disorder of kidney and ureter     History of peptic ulcer     Hyperlipidemia     Hypertension     Macular degeneration 3/13/2017    Morbid obesity     Nuclear sclerosis of both eyes 4/21/2017    Prediabetes        PAST SURGICAL HISTORY:  Past Surgical History:   Procedure Laterality Date    BREAST BIOPSY Left     BREAST LUMPECTOMY Right 11/2011    lumpectomy and SN biopsy with radiation    CATARACT EXTRACTION Left 07/22/2017    Dr. Okeefe    CATARACT EXTRACTION W/  INTRAOCULAR LENS IMPLANT Right 07/06/2017    Dr. Okeefe    CHOLECYSTECTOMY      EYE SURGERY      Lt cataract       SOCIAL HISTORY:  Social History     Socioeconomic History    Marital status:      Spouse name: Not on file    Number of children: 2    Years of education: Not on file    Highest education  level: Not on file   Occupational History    Occupation:    Social Needs    Financial resource strain: Not on file    Food insecurity:     Worry: Not on file     Inability: Not on file    Transportation needs:     Medical: Not on file     Non-medical: Not on file   Tobacco Use    Smoking status: Never Smoker    Smokeless tobacco: Never Used   Substance and Sexual Activity    Alcohol use: No    Drug use: No    Sexual activity: Yes     Partners: Male   Lifestyle    Physical activity:     Days per week: Not on file     Minutes per session: Not on file    Stress: Not on file   Relationships    Social connections:     Talks on phone: Not on file     Gets together: Not on file     Attends Denominational service: Not on file     Active member of club or organization: Not on file     Attends meetings of clubs or organizations: Not on file     Relationship status: Not on file   Other Topics Concern    Not on file   Social History Narrative    Not on file       FAMILY HISTORY:  Family History   Problem Relation Age of Onset    Stomach cancer Mother     Lung cancer Father     Hypertension Father     Breast cancer Sister 58    Cataracts Sister     Macular degeneration Sister     Cancer Sister         breast CA    Macular degeneration Brother         wet    Hypertension Brother     Cataracts Sister     Macular degeneration Sister     Breast cancer Sister 72    Cancer Sister         breast CA     Diabetes Sister     Hypertension Sister     Macular degeneration Sister     Macular degeneration Sister     Macular degeneration Brother         dry    Hypertension Brother     Hypertension Brother     Diabetes Brother     Hypertension Brother     Prostate cancer Brother     Cancer Brother 80        prostate     Hypertension Brother     Hyperlipidemia Brother     No Known Problems Maternal Grandmother     No Known Problems Maternal Grandfather     No Known Problems Paternal Grandmother      No Known Problems Paternal Grandfather     Ovarian cancer Neg Hx     Amblyopia Neg Hx     Blindness Neg Hx     Glaucoma Neg Hx     Retinal detachment Neg Hx     Strabismus Neg Hx     Stroke Neg Hx     Thyroid disease Neg Hx        ALLERGIES AND MEDICATIONS: updated and reviewed.  Review of patient's allergies indicates:   Allergen Reactions    Medrol [methylprednisolone] Other (See Comments)     Depression/tearful    Penicillins      Other reaction(s): Rash     Medication List with Changes/Refills   Current Medications    ACETAMINOPHEN (TYLENOL) 500 MG TABLET    Take 500 mg by mouth every 6 (six) hours as needed for Pain.    ANTIOX#10/OM3/DHA/EPA/LUT/ZEAX (I-CAPS ORAL)    Take 1 tablet by mouth once daily.    ASPIRIN (ECOTRIN) 81 MG EC TABLET    Take 1 tablet (81 mg total) by mouth once daily.    IBUPROFEN (ADVIL,MOTRIN) 800 MG TABLET    Take 1 tablet (800 mg total) by mouth every 6 (six) hours as needed for Pain.   Changed and/or Refilled Medications    Modified Medication Previous Medication    FENOFIBRATE (TRICOR) 145 MG TABLET fenofibrate (TRICOR) 145 MG tablet       Take 1 tablet (145 mg total) by mouth once daily. 1 Tablet Oral Every evening    Take 1 tablet (145 mg total) by mouth once daily. 1 Tablet Oral Every evening    HYDROCHLOROTHIAZIDE (HYDRODIURIL) 25 MG TABLET hydroCHLOROthiazide (HYDRODIURIL) 25 MG tablet       Take 1 tablet (25 mg total) by mouth once daily. 1 Tablet Oral Every morning    Take 1 tablet (25 mg total) by mouth once daily. 1 Tablet Oral Every morning    LISINOPRIL (PRINIVIL,ZESTRIL) 40 MG TABLET lisinopril (PRINIVIL,ZESTRIL) 40 MG tablet       Take 1 tablet (40 mg total) by mouth once daily. 1 Tablet Oral Every morning    Take 1 tablet (40 mg total) by mouth once daily. 1 Tablet Oral Every morning    OMEPRAZOLE (PRILOSEC) 40 MG CAPSULE omeprazole (PRILOSEC) 40 MG capsule       Take 1 capsule (40 mg total) by mouth once daily.    Take 1 capsule (40 mg total) by mouth once  "daily.    PRAVASTATIN (PRAVACHOL) 10 MG TABLET pravastatin (PRAVACHOL) 10 MG tablet       Take 1 tablet (10 mg total) by mouth once daily.    Take 1 tablet (10 mg total) by mouth once daily.          CARE TEAM:  Patient Care Team:  Lulu Auguste MD as PCP - General (Internal Medicine)  Kyler Hickman MD as Consulting Physician (Hematology and Oncology)  Wang Agarwal OD as Consulting Physician (Optometry)  Kyler Hickman MD as Consulting Physician (Hematology and Oncology)         REVIEW OF SYSTEMS:  Review of Systems   Constitutional: Negative for chills, fatigue, fever and unexpected weight change.   HENT: Negative for congestion and postnasal drip.    Eyes: Negative for pain and visual disturbance.   Respiratory: Negative for cough, shortness of breath and wheezing.    Cardiovascular: Negative for chest pain, palpitations and leg swelling.   Gastrointestinal: Negative for abdominal pain, constipation, diarrhea, nausea and vomiting.   Genitourinary: Negative for dysuria.   Musculoskeletal: Positive for back pain. Negative for arthralgias.   Skin: Negative for rash.   Neurological: Negative for weakness and headaches.   Psychiatric/Behavioral: Negative for dysphoric mood and sleep disturbance. The patient is not nervous/anxious.          PHYSICAL EXAM:   Vitals:    01/03/20 1029   BP: (!) 122/50   Pulse: 90   Temp: 97.7 °F (36.5 °C)     Weight: 101.4 kg (223 lb 7 oz)   Height: 5' 1" (154.9 cm)   Body mass index is 42.22 kg/m².      General appearance - alert, well appearing, and in no distress, morbidly obese  Psychiatric - alert, oriented to person, place, and time, normal mood, behavior, speech, dress, motor activity, and thought processes  Eyes - pupils equal and reactive, extraocular eye movements intact, sclera anicteric  Mouth - mucous membranes moist, pharynx normal without lesions  Neck - supple, no significant adenopathy, carotids upstroke normal bilaterally, no bruits, thyroid exam: thyroid is " normal in size without nodules or tenderness  Lymphatics - no palpable cervical lymphadenopathy  Chest - clear to auscultation, no wheezes, rales or rhonchi, symmetric air entry  Heart - normal rate and regular rhythm, no gallops noted  Back exam - mild limit in range of motion noted on exam due to body habitus and discomfort, mild pain with motion noted during exam, in depth exam deferred  Neurological - alert, normal speech, no focal findings or movement disorder noted, cranial nerves II through XII intact  Musculoskeletal - patient noted to have Mild-Moderate osteoarthritic changes to both knee joints. No joint effusions noted., no muscular tenderness noted  Extremities - pedal edema trace, intact peripheral pulses, varicose veins noted - mild bilateral  Skin - normal coloration and turgor, no rashes, no suspicious skin lesions noted      Labs:  No labs needed at this time      ASSESSMENT AND PLAN:  1. Benign hypertension with chronic kidney disease, stage III  Discussed sodium restriction, maintaining ideal body weight and regular exercise program as physiologic means to achieve blood pressure control. The patient will strive towards this. The current medical regimen is effective;  continue present plan and medications. Recommended patient to check home readings to monitor and see me for followup as scheduled or sooner as needed. Patient was educated that both decongestant and anti-inflammatory medication may raise blood pressure.Stable decreased kidney function. Observe. Patient counseled to avoid/minimize the use of anti-inflammatory  Medication. Discussed to stay well hydrated. Also discussed with patient that good control of blood pressure and/or diabetes, if present, will help to prevent progression.  - hydroCHLOROthiazide (HYDRODIURIL) 25 MG tablet; Take 1 tablet (25 mg total) by mouth once daily. 1 Tablet Oral Every morning  Dispense: 90 tablet; Refill: 3  - lisinopril (PRINIVIL,ZESTRIL) 40 MG tablet;  Take 1 tablet (40 mg total) by mouth once daily. 1 Tablet Oral Every morning  Dispense: 90 tablet; Refill: 3    2. Other hyperlipidemia  We discussed low fat diet and regular exercise.The current medical regimen is effective;  continue present plan and medications.   - fenofibrate (TRICOR) 145 MG tablet; Take 1 tablet (145 mg total) by mouth once daily. 1 Tablet Oral Every evening  Dispense: 90 tablet; Refill: 3  - pravastatin (PRAVACHOL) 10 MG tablet; Take 1 tablet (10 mg total) by mouth once daily.  Dispense: 90 tablet; Refill: 3    3. Prediabetes  Stable. We discussed low sugar/low carbohydrate diet and regular exercise to prevent progression. No need for prescription medication at this time.    4. Gastroesophageal reflux disease without esophagitis/5. Belching  Symptoms controlled: yes - takes medication occasionally as needed.   Reflux precautions discussed (eliminate tobacco if a smoker; minimize caffeine, chocolate and red/white peppermint intake; avoid heavy and spicy meals; don't lay down within 2-3 hours after eating; minimize the intake of NSAIDs). Medication as needed. Patient asked to take medication breaks, if possible - discussed chronic use can limit calcium absorption (which can lead to osteopenia/osteoporosis), increases the risk for intestinal infections, and can cause kidney damage. There are also some newer studies that show possible increased risk of mortality.  - omeprazole (PRILOSEC) 40 MG capsule; Take 1 capsule (40 mg total) by mouth once daily.  Dispense: 90 capsule; Refill: 0    6. Atherosclerosis of aorta  Patient with Atherosclerosis of the Aorta.  Stable/asymptomatic. Currently stable on lipid lowering medication and b/p monitoring.     7. Morbid obesity with BMI of 40.0-44.9, adult  The patient is asked to make an attempt to improve diet and exercise patterns to aid in medical management of this problem.    8. Advanced directives, counseling/discussion  Advance Care Planning   I  initiated the process and explained the importance of advance care planning today with the patient.  Advanced directives were discussed due to patient's age and/or chronic medical conditions. Prognosis based on current condition: good.   Paperwork was given to complete living will (at this point in time, the patient does have full decision-making capacity.  We discussed different extreme health states that she could experience, and reviewed what kind of medical care she would want in those situations) and medical POA (The patient received detailed information about the importance of designating a Health Care Power of . She was also instructed to communicate with this person about their wishes for future healthcare, should she become sick and lose decision-making capacity).   [place] was not discussed.   Approximately 3 minute(s) were spent on counseling/discussion regarding end of life decision making.           9. Encounter for screening mammogram for breast cancer  Patient will call to schedule at her earliest convenience.    10. Need for shingles vaccine  Not available to give today.  - Zoster Recombinant Vaccine    11. Need for Tdap vaccination  Patient was advised to get immunization at the pharmacy.    12. Chronic left-sided low back pain without sciatica  We discussed icing and heat.  I gave her information on the healthy back program.  She declines referral to a specialist for further evaluation.       Patient had positive fit kit May 2018.  She continues to decline to do a colonoscopy.  She states that the fit kit was positive because she strained and had hemorrhoids that bled.    Follow up in about 6 months (around 7/3/2020), or if symptoms worsen or fail to improve, for follow up chronic medical conditions.. or sooner as needed.

## 2020-03-31 ENCOUNTER — EXTERNAL CHRONIC CARE MANAGEMENT (OUTPATIENT)
Dept: PRIMARY CARE CLINIC | Facility: CLINIC | Age: 75
End: 2020-03-31
Payer: MEDICARE

## 2020-03-31 PROCEDURE — 99490 CHRNC CARE MGMT STAFF 1ST 20: CPT | Mod: PBBFAC,PO | Performed by: INTERNAL MEDICINE

## 2020-03-31 PROCEDURE — 99490 PR CHRONIC CARE MGMT, 1ST 20 MIN: ICD-10-PCS | Mod: S$PBB,,, | Performed by: INTERNAL MEDICINE

## 2020-03-31 PROCEDURE — 99490 CHRNC CARE MGMT STAFF 1ST 20: CPT | Mod: S$PBB,,, | Performed by: INTERNAL MEDICINE

## 2020-04-30 ENCOUNTER — EXTERNAL CHRONIC CARE MANAGEMENT (OUTPATIENT)
Dept: PRIMARY CARE CLINIC | Facility: CLINIC | Age: 75
End: 2020-04-30
Payer: MEDICARE

## 2020-04-30 PROCEDURE — 99490 PR CHRONIC CARE MGMT, 1ST 20 MIN: ICD-10-PCS | Mod: S$PBB,,, | Performed by: INTERNAL MEDICINE

## 2020-04-30 PROCEDURE — 99490 CHRNC CARE MGMT STAFF 1ST 20: CPT | Mod: PBBFAC,PO | Performed by: INTERNAL MEDICINE

## 2020-04-30 PROCEDURE — 99490 CHRNC CARE MGMT STAFF 1ST 20: CPT | Mod: S$PBB,,, | Performed by: INTERNAL MEDICINE

## 2020-05-07 DIAGNOSIS — N18.30 BENIGN HYPERTENSION WITH CHRONIC KIDNEY DISEASE, STAGE III: ICD-10-CM

## 2020-05-07 DIAGNOSIS — I12.9 BENIGN HYPERTENSION WITH CHRONIC KIDNEY DISEASE, STAGE III: ICD-10-CM

## 2020-05-22 NOTE — PROGRESS NOTES
Subjective:       Patient ID: Francoise De Jesus is a 72 y.o. female.    Chief Complaint: Hyperlipidemia (F/U) and Hypertension (F/U)    72-year-old female presents to the clinic today for follow-up on hypertension and hyperlipidemia.  She has fair dietary habits.  She is active but does not have an exercise program.  She is compliant with all of her medications.  Her mammograms already scheduled for November 27, 2017.  She has an appointment with her oncologist on November 27, 2017.  She was taken off her Arimidex in April of this year.  She denies any cardiac chest pain, heart palpitations, shortness breath, or swelling to lower extremities.  She denies any headaches, dizziness, or blurred vision.  She had the flu 2 weeks ago even though having the flu shot in October 2017. She does not take any anti-inflammatories.       Past Medical History:   Diagnosis Date    Arthritis     Atherosclerosis of aorta     noted on CXR 11/17/2014    Breast cancer 2011    stage I right breast cancer    History of peptic ulcer     Hyperlipidemia     Hypertension     Macular degeneration 3/13/2017    Morbid obesity     Nuclear sclerosis of both eyes 4/21/2017    Prediabetes      Past Surgical History:   Procedure Laterality Date    BREAST LUMPECTOMY Right 11/2011    right lumpectomy and SN biopsy    CATARACT EXTRACTION Left 07/22/2017    Dr. Okeefe    CATARACT EXTRACTION W/  INTRAOCULAR LENS IMPLANT Right 07/06/2017    Dr. Okeefe    CHOLECYSTECTOMY      EYE SURGERY      Lt cataract      reports that she has never smoked. She has never used smokeless tobacco. She reports that she does not drink alcohol or use drugs.  Review of Systems   Respiratory: Negative for cough, shortness of breath and wheezing.    Cardiovascular: Negative for chest pain, palpitations and leg swelling.   Gastrointestinal: Negative for abdominal pain, blood in stool, constipation, diarrhea, nausea and vomiting.   Musculoskeletal: Negative for gait  problem.   Skin: Negative for rash.   Neurological: Negative for dizziness, light-headedness and headaches.       Objective:      Physical Exam   Constitutional: She is oriented to person, place, and time. She appears well-developed and well-nourished. No distress.   Eyes: Conjunctivae and EOM are normal. Pupils are equal, round, and reactive to light. Right eye exhibits no discharge. Left eye exhibits no discharge. No scleral icterus.   Neck: Normal range of motion. Neck supple. No JVD present.   Cardiovascular: Normal rate, regular rhythm and normal heart sounds.    No murmur heard.  Pulmonary/Chest: Effort normal and breath sounds normal. No respiratory distress. She has no wheezes. She has no rales.   Abdominal: Soft. Bowel sounds are normal. There is no tenderness.   Musculoskeletal: Normal range of motion. She exhibits no edema.   Neurological: She is alert and oriented to person, place, and time.   Skin: Skin is warm and dry. She is not diaphoretic.   Psychiatric: She has a normal mood and affect.       Assessment:       1. Atherosclerosis of aorta    2. Hyperlipidemia, unspecified hyperlipidemia type    3. Benign hypertension with chronic kidney disease, stage III    4. History of breast cancer    5. CKD (chronic kidney disease) stage 3, GFR 30-59 ml/min    6. Prediabetes    7. Morbid obesity with BMI of 40.0-44.9, adult        Plan:         Atherosclerosis of aorta  - Stable / Asymptomatic is on blood pressure and cholesterol lowering medications    Hyperlipidemia, unspecified hyperlipidemia type  -     pravastatin (PRAVACHOL) 10 MG tablet; Take 1 tablet (10 mg total) by mouth once daily.  Dispense: 90 tablet; Refill: 1  -     fenofibrate (TRICOR) 145 MG tablet; Take 1 tablet (145 mg total) by mouth once daily. 1 Tablet Oral Every evening  Dispense: 90 tablet; Refill: 1  - The current medical regimen is effective;  continue present plan and medications.    Benign hypertension with chronic kidney disease,  stage III  -     lisinopril (PRINIVIL,ZESTRIL) 40 MG tablet; Take 1 tablet (40 mg total) by mouth once daily. 1 Tablet Oral Every morning  Dispense: 90 tablet; Refill: 1  -     hydroCHLOROthiazide (HYDRODIURIL) 25 MG tablet; Take 1 tablet (25 mg total) by mouth once daily. 1 Tablet Oral Every morning  Dispense: 90 tablet; Refill: 1  - The current medical regimen is effective;  continue present plan and medications.    History of breast cancer  - Mammogram scheduled 11/27/2017  - oncology appointment 11/27/2017    CKD (chronic kidney disease) stage 3, GFR 30-59 ml/min  - stay well hydrated  - avoid all anti-inflammatories     Prediabetes  - avoid excessive sugars and carbohydrates     Morbid obesity   - The current medical regimen is effective;  continue present plan and medications.       77 y/o female with no significant PMHx presents to the ED c/o COVID testing x today. Pt notes she was seen in the ED x 2 days ago on May 20th and had COVID testing done. Lab called the pt that there was an error in the specimen and she was instructed to come back to the ED for repeat testing. Pt notes lowgrade fever a few days ago, but pt denies cough, shortness of breath, fever or any other complaints currently. Pt allergic to ASA.

## 2020-05-31 ENCOUNTER — EXTERNAL CHRONIC CARE MANAGEMENT (OUTPATIENT)
Dept: PRIMARY CARE CLINIC | Facility: CLINIC | Age: 75
End: 2020-05-31
Payer: MEDICARE

## 2020-05-31 PROCEDURE — 99490 CHRNC CARE MGMT STAFF 1ST 20: CPT | Mod: PBBFAC,PO | Performed by: INTERNAL MEDICINE

## 2020-05-31 PROCEDURE — 99490 CHRNC CARE MGMT STAFF 1ST 20: CPT | Mod: S$PBB,,, | Performed by: INTERNAL MEDICINE

## 2020-05-31 PROCEDURE — 99490 PR CHRONIC CARE MGMT, 1ST 20 MIN: ICD-10-PCS | Mod: S$PBB,,, | Performed by: INTERNAL MEDICINE

## 2020-06-30 ENCOUNTER — EXTERNAL CHRONIC CARE MANAGEMENT (OUTPATIENT)
Dept: PRIMARY CARE CLINIC | Facility: CLINIC | Age: 75
End: 2020-06-30
Payer: MEDICARE

## 2020-06-30 PROCEDURE — 99490 CHRNC CARE MGMT STAFF 1ST 20: CPT | Mod: S$PBB,,, | Performed by: INTERNAL MEDICINE

## 2020-06-30 PROCEDURE — 99490 PR CHRONIC CARE MGMT, 1ST 20 MIN: ICD-10-PCS | Mod: S$PBB,,, | Performed by: INTERNAL MEDICINE

## 2020-06-30 PROCEDURE — 99490 CHRNC CARE MGMT STAFF 1ST 20: CPT | Mod: PBBFAC,PO | Performed by: INTERNAL MEDICINE

## 2020-07-31 ENCOUNTER — EXTERNAL CHRONIC CARE MANAGEMENT (OUTPATIENT)
Dept: PRIMARY CARE CLINIC | Facility: CLINIC | Age: 75
End: 2020-07-31
Payer: MEDICARE

## 2020-07-31 PROCEDURE — 99490 CHRNC CARE MGMT STAFF 1ST 20: CPT | Mod: PBBFAC,PO | Performed by: INTERNAL MEDICINE

## 2020-07-31 PROCEDURE — 99490 PR CHRONIC CARE MGMT, 1ST 20 MIN: ICD-10-PCS | Mod: S$PBB,,, | Performed by: INTERNAL MEDICINE

## 2020-07-31 PROCEDURE — 99490 CHRNC CARE MGMT STAFF 1ST 20: CPT | Mod: S$PBB,,, | Performed by: INTERNAL MEDICINE

## 2020-08-14 DIAGNOSIS — Z11.59 NEED FOR HEPATITIS C SCREENING TEST: ICD-10-CM

## 2020-08-31 ENCOUNTER — EXTERNAL CHRONIC CARE MANAGEMENT (OUTPATIENT)
Dept: PRIMARY CARE CLINIC | Facility: CLINIC | Age: 75
End: 2020-08-31
Payer: MEDICARE

## 2020-08-31 PROCEDURE — 99490 CHRNC CARE MGMT STAFF 1ST 20: CPT | Mod: S$PBB,,, | Performed by: INTERNAL MEDICINE

## 2020-08-31 PROCEDURE — 99490 CHRNC CARE MGMT STAFF 1ST 20: CPT | Mod: PBBFAC,PO | Performed by: INTERNAL MEDICINE

## 2020-08-31 PROCEDURE — 99490 PR CHRONIC CARE MGMT, 1ST 20 MIN: ICD-10-PCS | Mod: S$PBB,,, | Performed by: INTERNAL MEDICINE

## 2020-09-30 ENCOUNTER — EXTERNAL CHRONIC CARE MANAGEMENT (OUTPATIENT)
Dept: PRIMARY CARE CLINIC | Facility: CLINIC | Age: 75
End: 2020-09-30
Payer: MEDICARE

## 2020-09-30 PROCEDURE — 99490 CHRNC CARE MGMT STAFF 1ST 20: CPT | Mod: S$PBB,,, | Performed by: INTERNAL MEDICINE

## 2020-09-30 PROCEDURE — 99490 PR CHRONIC CARE MGMT, 1ST 20 MIN: ICD-10-PCS | Mod: S$PBB,,, | Performed by: INTERNAL MEDICINE

## 2020-09-30 PROCEDURE — 99490 CHRNC CARE MGMT STAFF 1ST 20: CPT | Mod: PBBFAC,PO | Performed by: INTERNAL MEDICINE

## 2020-11-06 ENCOUNTER — PES CALL (OUTPATIENT)
Dept: ADMINISTRATIVE | Facility: CLINIC | Age: 75
End: 2020-11-06

## 2021-01-10 ENCOUNTER — IMMUNIZATION (OUTPATIENT)
Dept: INTERNAL MEDICINE | Facility: CLINIC | Age: 76
End: 2021-01-10
Payer: MEDICARE

## 2021-01-10 DIAGNOSIS — Z23 NEED FOR VACCINATION: ICD-10-CM

## 2021-01-10 PROCEDURE — 91300 COVID-19, MRNA, LNP-S, PF, 30 MCG/0.3 ML DOSE VACCINE: CPT | Mod: PBBFAC

## 2021-01-11 ENCOUNTER — TELEPHONE (OUTPATIENT)
Dept: FAMILY MEDICINE | Facility: CLINIC | Age: 76
End: 2021-01-11

## 2021-01-11 DIAGNOSIS — Z11.59 NEED FOR HEPATITIS C SCREENING TEST: Primary | ICD-10-CM

## 2021-01-11 DIAGNOSIS — E78.49 OTHER HYPERLIPIDEMIA: ICD-10-CM

## 2021-01-11 DIAGNOSIS — R73.03 PREDIABETES: ICD-10-CM

## 2021-01-11 DIAGNOSIS — I12.9 BENIGN HYPERTENSION WITH CHRONIC KIDNEY DISEASE, STAGE III: ICD-10-CM

## 2021-01-11 DIAGNOSIS — N18.30 BENIGN HYPERTENSION WITH CHRONIC KIDNEY DISEASE, STAGE III: ICD-10-CM

## 2021-01-11 RX ORDER — LISINOPRIL 40 MG/1
40 TABLET ORAL DAILY
Qty: 90 TABLET | Refills: 0 | Status: SHIPPED | OUTPATIENT
Start: 2021-01-11 | End: 2021-01-23 | Stop reason: SDUPTHER

## 2021-01-11 RX ORDER — HYDROCHLOROTHIAZIDE 25 MG/1
25 TABLET ORAL DAILY
Qty: 90 TABLET | Refills: 0 | Status: SHIPPED | OUTPATIENT
Start: 2021-01-11 | End: 2021-01-23 | Stop reason: SDUPTHER

## 2021-01-11 RX ORDER — FENOFIBRATE 145 MG/1
145 TABLET, FILM COATED ORAL DAILY
Qty: 90 TABLET | Refills: 0 | Status: SHIPPED | OUTPATIENT
Start: 2021-01-11 | End: 2021-01-23 | Stop reason: SDUPTHER

## 2021-01-11 RX ORDER — PRAVASTATIN SODIUM 10 MG/1
10 TABLET ORAL DAILY
Qty: 90 TABLET | Refills: 0 | Status: SHIPPED | OUTPATIENT
Start: 2021-01-11 | End: 2021-01-23 | Stop reason: SDUPTHER

## 2021-01-16 ENCOUNTER — LAB VISIT (OUTPATIENT)
Dept: LAB | Facility: HOSPITAL | Age: 76
End: 2021-01-16
Attending: INTERNAL MEDICINE
Payer: MEDICARE

## 2021-01-16 DIAGNOSIS — I12.9 BENIGN HYPERTENSION WITH CHRONIC KIDNEY DISEASE, STAGE III: ICD-10-CM

## 2021-01-16 DIAGNOSIS — N18.30 BENIGN HYPERTENSION WITH CHRONIC KIDNEY DISEASE, STAGE III: ICD-10-CM

## 2021-01-16 DIAGNOSIS — E78.49 OTHER HYPERLIPIDEMIA: ICD-10-CM

## 2021-01-16 DIAGNOSIS — Z11.59 NEED FOR HEPATITIS C SCREENING TEST: ICD-10-CM

## 2021-01-16 DIAGNOSIS — R73.03 PREDIABETES: ICD-10-CM

## 2021-01-16 LAB
ALBUMIN SERPL BCP-MCNC: 3.9 G/DL (ref 3.5–5.2)
ALP SERPL-CCNC: 57 U/L (ref 55–135)
ALT SERPL W/O P-5'-P-CCNC: 13 U/L (ref 10–44)
ANION GAP SERPL CALC-SCNC: 9 MMOL/L (ref 8–16)
AST SERPL-CCNC: 21 U/L (ref 10–40)
BASOPHILS # BLD AUTO: 0.04 K/UL (ref 0–0.2)
BASOPHILS NFR BLD: 0.4 % (ref 0–1.9)
BILIRUB SERPL-MCNC: 0.4 MG/DL (ref 0.1–1)
BUN SERPL-MCNC: 23 MG/DL (ref 8–23)
CALCIUM SERPL-MCNC: 9.4 MG/DL (ref 8.7–10.5)
CHLORIDE SERPL-SCNC: 103 MMOL/L (ref 95–110)
CHOLEST SERPL-MCNC: 167 MG/DL (ref 120–199)
CHOLEST/HDLC SERPL: 3.3 {RATIO} (ref 2–5)
CO2 SERPL-SCNC: 27 MMOL/L (ref 23–29)
CREAT SERPL-MCNC: 1 MG/DL (ref 0.5–1.4)
DIFFERENTIAL METHOD: ABNORMAL
EOSINOPHIL # BLD AUTO: 0.2 K/UL (ref 0–0.5)
EOSINOPHIL NFR BLD: 1.9 % (ref 0–8)
ERYTHROCYTE [DISTWIDTH] IN BLOOD BY AUTOMATED COUNT: 12.7 % (ref 11.5–14.5)
EST. GFR  (AFRICAN AMERICAN): >60 ML/MIN/1.73 M^2
EST. GFR  (NON AFRICAN AMERICAN): 54.9 ML/MIN/1.73 M^2
ESTIMATED AVG GLUCOSE: 131 MG/DL (ref 68–131)
GLUCOSE SERPL-MCNC: 121 MG/DL (ref 70–110)
HBA1C MFR BLD HPLC: 6.2 % (ref 4–5.6)
HCT VFR BLD AUTO: 41.1 % (ref 37–48.5)
HDLC SERPL-MCNC: 50 MG/DL (ref 40–75)
HDLC SERPL: 29.9 % (ref 20–50)
HGB BLD-MCNC: 12.7 G/DL (ref 12–16)
IMM GRANULOCYTES # BLD AUTO: 0.04 K/UL (ref 0–0.04)
IMM GRANULOCYTES NFR BLD AUTO: 0.4 % (ref 0–0.5)
LDLC SERPL CALC-MCNC: 79.2 MG/DL (ref 63–159)
LYMPHOCYTES # BLD AUTO: 2.1 K/UL (ref 1–4.8)
LYMPHOCYTES NFR BLD: 22.7 % (ref 18–48)
MCH RBC QN AUTO: 30.5 PG (ref 27–31)
MCHC RBC AUTO-ENTMCNC: 30.9 G/DL (ref 32–36)
MCV RBC AUTO: 99 FL (ref 82–98)
MONOCYTES # BLD AUTO: 0.6 K/UL (ref 0.3–1)
MONOCYTES NFR BLD: 7.1 % (ref 4–15)
NEUTROPHILS # BLD AUTO: 6.1 K/UL (ref 1.8–7.7)
NEUTROPHILS NFR BLD: 67.5 % (ref 38–73)
NONHDLC SERPL-MCNC: 117 MG/DL
NRBC BLD-RTO: 0 /100 WBC
PLATELET # BLD AUTO: 205 K/UL (ref 150–350)
PMV BLD AUTO: 11.4 FL (ref 9.2–12.9)
POTASSIUM SERPL-SCNC: 4.2 MMOL/L (ref 3.5–5.1)
PROT SERPL-MCNC: 7.8 G/DL (ref 6–8.4)
RBC # BLD AUTO: 4.17 M/UL (ref 4–5.4)
SODIUM SERPL-SCNC: 139 MMOL/L (ref 136–145)
TRIGL SERPL-MCNC: 189 MG/DL (ref 30–150)
WBC # BLD AUTO: 9.03 K/UL (ref 3.9–12.7)

## 2021-01-16 PROCEDURE — 80053 COMPREHEN METABOLIC PANEL: CPT

## 2021-01-16 PROCEDURE — 85025 COMPLETE CBC W/AUTO DIFF WBC: CPT

## 2021-01-16 PROCEDURE — 83036 HEMOGLOBIN GLYCOSYLATED A1C: CPT

## 2021-01-16 PROCEDURE — 86803 HEPATITIS C AB TEST: CPT

## 2021-01-16 PROCEDURE — 80061 LIPID PANEL: CPT

## 2021-01-16 PROCEDURE — 36415 COLL VENOUS BLD VENIPUNCTURE: CPT | Mod: PO

## 2021-01-18 LAB — HCV AB SERPL QL IA: NEGATIVE

## 2021-01-23 ENCOUNTER — OFFICE VISIT (OUTPATIENT)
Dept: FAMILY MEDICINE | Facility: CLINIC | Age: 76
End: 2021-01-23
Payer: MEDICARE

## 2021-01-23 VITALS
DIASTOLIC BLOOD PRESSURE: 60 MMHG | WEIGHT: 233.94 LBS | HEIGHT: 61 IN | BODY MASS INDEX: 44.17 KG/M2 | HEART RATE: 88 BPM | TEMPERATURE: 98 F | SYSTOLIC BLOOD PRESSURE: 120 MMHG | OXYGEN SATURATION: 99 %

## 2021-01-23 DIAGNOSIS — Z12.31 ENCOUNTER FOR SCREENING MAMMOGRAM FOR BREAST CANCER: ICD-10-CM

## 2021-01-23 DIAGNOSIS — E78.49 OTHER HYPERLIPIDEMIA: ICD-10-CM

## 2021-01-23 DIAGNOSIS — R73.03 PREDIABETES: ICD-10-CM

## 2021-01-23 DIAGNOSIS — N18.30 BENIGN HYPERTENSION WITH CHRONIC KIDNEY DISEASE, STAGE III: Primary | ICD-10-CM

## 2021-01-23 DIAGNOSIS — Z71.89 ADVANCED DIRECTIVES, COUNSELING/DISCUSSION: ICD-10-CM

## 2021-01-23 DIAGNOSIS — E66.01 MORBID OBESITY WITH BMI OF 40.0-44.9, ADULT: ICD-10-CM

## 2021-01-23 DIAGNOSIS — Z23 NEED FOR SHINGLES VACCINE: ICD-10-CM

## 2021-01-23 DIAGNOSIS — I12.9 BENIGN HYPERTENSION WITH CHRONIC KIDNEY DISEASE, STAGE III: Primary | ICD-10-CM

## 2021-01-23 DIAGNOSIS — Z23 NEED FOR TDAP VACCINATION: ICD-10-CM

## 2021-01-23 DIAGNOSIS — I70.0 ATHEROSCLEROSIS OF AORTA: ICD-10-CM

## 2021-01-23 DIAGNOSIS — K21.9 GASTROESOPHAGEAL REFLUX DISEASE WITHOUT ESOPHAGITIS: ICD-10-CM

## 2021-01-23 PROCEDURE — 99214 PR OFFICE/OUTPT VISIT, EST, LEVL IV, 30-39 MIN: ICD-10-PCS | Mod: S$PBB,,, | Performed by: INTERNAL MEDICINE

## 2021-01-23 PROCEDURE — 99999 PR PBB SHADOW E&M-EST. PATIENT-LVL III: ICD-10-PCS | Mod: PBBFAC,,, | Performed by: INTERNAL MEDICINE

## 2021-01-23 PROCEDURE — 99999 PR PBB SHADOW E&M-EST. PATIENT-LVL III: CPT | Mod: PBBFAC,,, | Performed by: INTERNAL MEDICINE

## 2021-01-23 PROCEDURE — 99213 OFFICE O/P EST LOW 20 MIN: CPT | Mod: PBBFAC,PO | Performed by: INTERNAL MEDICINE

## 2021-01-23 PROCEDURE — 99214 OFFICE O/P EST MOD 30 MIN: CPT | Mod: S$PBB,,, | Performed by: INTERNAL MEDICINE

## 2021-01-23 RX ORDER — FENOFIBRATE 145 MG/1
145 TABLET, FILM COATED ORAL DAILY
Qty: 90 TABLET | Refills: 3 | Status: SHIPPED | OUTPATIENT
Start: 2021-01-23 | End: 2022-02-14 | Stop reason: SDUPTHER

## 2021-01-23 RX ORDER — LISINOPRIL 40 MG/1
40 TABLET ORAL DAILY
Qty: 90 TABLET | Refills: 3 | Status: SHIPPED | OUTPATIENT
Start: 2021-01-23 | End: 2022-02-14 | Stop reason: SDUPTHER

## 2021-01-23 RX ORDER — PRAVASTATIN SODIUM 10 MG/1
10 TABLET ORAL DAILY
Qty: 90 TABLET | Refills: 3 | Status: SHIPPED | OUTPATIENT
Start: 2021-01-23 | End: 2022-02-14 | Stop reason: SDUPTHER

## 2021-01-23 RX ORDER — HYDROCHLOROTHIAZIDE 25 MG/1
25 TABLET ORAL DAILY
Qty: 90 TABLET | Refills: 3 | Status: SHIPPED | OUTPATIENT
Start: 2021-01-23 | End: 2022-02-14 | Stop reason: SDUPTHER

## 2021-01-31 ENCOUNTER — IMMUNIZATION (OUTPATIENT)
Dept: INTERNAL MEDICINE | Facility: CLINIC | Age: 76
End: 2021-01-31
Payer: MEDICARE

## 2021-01-31 DIAGNOSIS — Z23 NEED FOR VACCINATION: Primary | ICD-10-CM

## 2021-01-31 PROCEDURE — 91300 COVID-19, MRNA, LNP-S, PF, 30 MCG/0.3 ML DOSE VACCINE: CPT | Mod: PBBFAC | Performed by: INTERNAL MEDICINE

## 2021-01-31 PROCEDURE — 0002A COVID-19, MRNA, LNP-S, PF, 30 MCG/0.3 ML DOSE VACCINE: CPT | Mod: PBBFAC | Performed by: INTERNAL MEDICINE

## 2021-02-25 ENCOUNTER — TELEPHONE (OUTPATIENT)
Dept: FAMILY MEDICINE | Facility: CLINIC | Age: 76
End: 2021-02-25

## 2021-02-25 DIAGNOSIS — Z12.31 ENCOUNTER FOR SCREENING MAMMOGRAM FOR BREAST CANCER: Primary | ICD-10-CM

## 2021-03-11 ENCOUNTER — HOSPITAL ENCOUNTER (OUTPATIENT)
Dept: RADIOLOGY | Facility: HOSPITAL | Age: 76
Discharge: HOME | End: 2021-03-11
Attending: INTERNAL MEDICINE
Payer: MEDICARE

## 2021-03-11 VITALS — HEIGHT: 62 IN | WEIGHT: 229 LBS | BODY MASS INDEX: 42.14 KG/M2

## 2021-03-11 DIAGNOSIS — Z12.31 ENCOUNTER FOR SCREENING MAMMOGRAM FOR BREAST CANCER: ICD-10-CM

## 2021-03-11 PROCEDURE — 77063 BREAST TOMOSYNTHESIS BI: CPT | Mod: 26,,, | Performed by: RADIOLOGY

## 2021-03-11 PROCEDURE — 77063 MAMMO DIGITAL SCREENING BILAT WITH TOMO: ICD-10-PCS | Mod: 26,,, | Performed by: RADIOLOGY

## 2021-03-11 PROCEDURE — 77067 MAMMO DIGITAL SCREENING BILAT WITH TOMO: ICD-10-PCS | Mod: 26,,, | Performed by: RADIOLOGY

## 2021-03-11 PROCEDURE — 77067 SCR MAMMO BI INCL CAD: CPT | Mod: TC

## 2021-03-11 PROCEDURE — 77067 SCR MAMMO BI INCL CAD: CPT | Mod: 26,,, | Performed by: RADIOLOGY

## 2021-03-31 ENCOUNTER — TELEPHONE (OUTPATIENT)
Dept: FAMILY MEDICINE | Facility: CLINIC | Age: 76
End: 2021-03-31

## 2021-03-31 ENCOUNTER — PES CALL (OUTPATIENT)
Dept: ADMINISTRATIVE | Facility: CLINIC | Age: 76
End: 2021-03-31

## 2021-05-27 ENCOUNTER — PES CALL (OUTPATIENT)
Dept: ADMINISTRATIVE | Facility: CLINIC | Age: 76
End: 2021-05-27

## 2021-07-14 ENCOUNTER — CLINICAL SUPPORT (OUTPATIENT)
Dept: FAMILY MEDICINE | Facility: CLINIC | Age: 76
End: 2021-07-14
Payer: MEDICARE

## 2021-07-14 DIAGNOSIS — Z23 NEED FOR SHINGLES VACCINE: Primary | ICD-10-CM

## 2021-07-14 PROCEDURE — 99499 NO LOS: ICD-10-PCS | Mod: S$PBB,,, | Performed by: INTERNAL MEDICINE

## 2021-07-14 PROCEDURE — 90750 HZV VACC RECOMBINANT IM: CPT | Mod: PBBFAC,PO | Performed by: INTERNAL MEDICINE

## 2021-07-14 PROCEDURE — 90471 IMMUNIZATION ADMIN: CPT | Mod: PBBFAC,PO | Performed by: INTERNAL MEDICINE

## 2021-07-14 PROCEDURE — 99499 UNLISTED E&M SERVICE: CPT | Mod: S$PBB,,, | Performed by: INTERNAL MEDICINE

## 2021-07-22 ENCOUNTER — PES CALL (OUTPATIENT)
Dept: ADMINISTRATIVE | Facility: CLINIC | Age: 76
End: 2021-07-22

## 2021-09-16 ENCOUNTER — PES CALL (OUTPATIENT)
Dept: ADMINISTRATIVE | Facility: CLINIC | Age: 76
End: 2021-09-16

## 2021-09-21 ENCOUNTER — TELEPHONE (OUTPATIENT)
Dept: FAMILY MEDICINE | Facility: CLINIC | Age: 76
End: 2021-09-21

## 2021-09-22 ENCOUNTER — TELEPHONE (OUTPATIENT)
Dept: FAMILY MEDICINE | Facility: CLINIC | Age: 76
End: 2021-09-22

## 2021-10-11 ENCOUNTER — OFFICE VISIT (OUTPATIENT)
Dept: OPTOMETRY | Facility: CLINIC | Age: 76
End: 2021-10-11
Payer: MEDICARE

## 2021-10-11 DIAGNOSIS — H35.3211 EXUDATIVE AGE-RELATED MACULAR DEGENERATION OF RIGHT EYE WITH ACTIVE CHOROIDAL NEOVASCULARIZATION: Primary | ICD-10-CM

## 2021-10-11 DIAGNOSIS — H35.3122 INTERMEDIATE STAGE NONEXUDATIVE AGE-RELATED MACULAR DEGENERATION OF LEFT EYE: ICD-10-CM

## 2021-10-11 DIAGNOSIS — Z96.1 PSEUDOPHAKIA: ICD-10-CM

## 2021-10-11 PROCEDURE — 99999 PR PBB SHADOW E&M-EST. PATIENT-LVL III: CPT | Mod: PBBFAC,,, | Performed by: OPTOMETRIST

## 2021-10-11 PROCEDURE — 92004 PR EYE EXAM, NEW PATIENT,COMPREHESV: ICD-10-PCS | Mod: S$PBB,,, | Performed by: OPTOMETRIST

## 2021-10-11 PROCEDURE — 99999 PR PBB SHADOW E&M-EST. PATIENT-LVL III: ICD-10-PCS | Mod: PBBFAC,,, | Performed by: OPTOMETRIST

## 2021-10-11 PROCEDURE — 99213 OFFICE O/P EST LOW 20 MIN: CPT | Mod: PBBFAC,PO | Performed by: OPTOMETRIST

## 2021-10-11 PROCEDURE — 92004 COMPRE OPH EXAM NEW PT 1/>: CPT | Mod: S$PBB,,, | Performed by: OPTOMETRIST

## 2021-10-19 ENCOUNTER — CLINICAL SUPPORT (OUTPATIENT)
Dept: FAMILY MEDICINE | Facility: CLINIC | Age: 76
End: 2021-10-19
Payer: MEDICARE

## 2021-10-19 DIAGNOSIS — Z23 FLU VACCINE NEED: Primary | ICD-10-CM

## 2021-10-19 PROCEDURE — 90694 VACC AIIV4 NO PRSRV 0.5ML IM: CPT | Mod: PBBFAC,PO | Performed by: INTERNAL MEDICINE

## 2021-10-19 PROCEDURE — 99499 NO LOS: ICD-10-PCS | Mod: S$PBB,,, | Performed by: INTERNAL MEDICINE

## 2021-10-19 PROCEDURE — G0008 ADMIN INFLUENZA VIRUS VAC: HCPCS | Mod: PBBFAC,PO | Performed by: INTERNAL MEDICINE

## 2021-10-19 PROCEDURE — 99499 UNLISTED E&M SERVICE: CPT | Mod: S$PBB,,, | Performed by: INTERNAL MEDICINE

## 2021-11-03 ENCOUNTER — OFFICE VISIT (OUTPATIENT)
Dept: OPHTHALMOLOGY | Facility: CLINIC | Age: 76
End: 2021-11-03
Attending: OPHTHALMOLOGY
Payer: MEDICARE

## 2021-11-03 DIAGNOSIS — H35.3123 NONEXUDATIVE AGE-RELATED MACULAR DEGENERATION, LEFT EYE, ADVANCED ATROPHIC WITHOUT SUBFOVEAL INVOLVEMENT: ICD-10-CM

## 2021-11-03 DIAGNOSIS — H35.3211 EXUDATIVE AGE-RELATED MACULAR DEGENERATION, RIGHT EYE, WITH ACTIVE CHOROIDAL NEOVASCULARIZATION: Primary | ICD-10-CM

## 2021-11-03 PROCEDURE — 99999 PR PBB SHADOW E&M-EST. PATIENT-LVL II: CPT | Mod: PBBFAC,,, | Performed by: OPHTHALMOLOGY

## 2021-11-03 PROCEDURE — 92134 CPTRZ OPH DX IMG PST SGM RTA: CPT | Mod: PBBFAC,PO | Performed by: OPHTHALMOLOGY

## 2021-11-03 PROCEDURE — 67028 INJECTION EYE DRUG: CPT | Mod: S$PBB,RT,, | Performed by: OPHTHALMOLOGY

## 2021-11-03 PROCEDURE — 99204 OFFICE O/P NEW MOD 45 MIN: CPT | Mod: 25,S$PBB,, | Performed by: OPHTHALMOLOGY

## 2021-11-03 PROCEDURE — 67028 INJECTION EYE DRUG: CPT | Mod: PBBFAC,PO,RT | Performed by: OPHTHALMOLOGY

## 2021-11-03 PROCEDURE — 99999 PR PBB SHADOW E&M-EST. PATIENT-LVL II: ICD-10-PCS | Mod: PBBFAC,,, | Performed by: OPHTHALMOLOGY

## 2021-11-03 PROCEDURE — 67028 PR INJECT INTRAVITREAL PHARMCOLOGIC: ICD-10-PCS | Mod: S$PBB,RT,, | Performed by: OPHTHALMOLOGY

## 2021-11-03 PROCEDURE — 99212 OFFICE O/P EST SF 10 MIN: CPT | Mod: PBBFAC,PO,25 | Performed by: OPHTHALMOLOGY

## 2021-11-03 PROCEDURE — 92134 POSTERIOR SEGMENT OCT RETINA (OCULAR COHERENCE TOMOGRAPHY)-BOTH EYES: ICD-10-PCS | Mod: 26,S$PBB,, | Performed by: OPHTHALMOLOGY

## 2021-11-03 PROCEDURE — 99204 PR OFFICE/OUTPT VISIT, NEW, LEVL IV, 45-59 MIN: ICD-10-PCS | Mod: 25,S$PBB,, | Performed by: OPHTHALMOLOGY

## 2021-11-03 RX ADMIN — BEVACIZUMAB 1.25 MG: 100 INJECTION, SOLUTION INTRAVENOUS at 02:11

## 2021-12-03 ENCOUNTER — PES CALL (OUTPATIENT)
Dept: ADMINISTRATIVE | Facility: CLINIC | Age: 76
End: 2021-12-03
Payer: MEDICARE

## 2021-12-08 ENCOUNTER — PROCEDURE VISIT (OUTPATIENT)
Dept: OPHTHALMOLOGY | Facility: CLINIC | Age: 76
End: 2021-12-08
Attending: OPHTHALMOLOGY
Payer: MEDICARE

## 2021-12-08 DIAGNOSIS — H35.3211 EXUDATIVE AGE-RELATED MACULAR DEGENERATION, RIGHT EYE, WITH ACTIVE CHOROIDAL NEOVASCULARIZATION: Primary | ICD-10-CM

## 2021-12-08 PROCEDURE — 67028 INJECTION EYE DRUG: CPT | Mod: PBBFAC,PO,RT | Performed by: OPHTHALMOLOGY

## 2021-12-08 PROCEDURE — 67028 INJECTION EYE DRUG: CPT | Mod: S$PBB,RT,, | Performed by: OPHTHALMOLOGY

## 2021-12-08 PROCEDURE — 67028 PR INJECT INTRAVITREAL PHARMCOLOGIC: ICD-10-PCS | Mod: S$PBB,RT,, | Performed by: OPHTHALMOLOGY

## 2021-12-08 RX ADMIN — BEVACIZUMAB 1.25 MG: 100 INJECTION, SOLUTION INTRAVENOUS at 02:12

## 2021-12-14 ENCOUNTER — OFFICE VISIT (OUTPATIENT)
Dept: FAMILY MEDICINE | Facility: CLINIC | Age: 76
End: 2021-12-14
Payer: MEDICARE

## 2021-12-14 VITALS
SYSTOLIC BLOOD PRESSURE: 122 MMHG | WEIGHT: 225.5 LBS | OXYGEN SATURATION: 97 % | HEART RATE: 74 BPM | HEIGHT: 62 IN | DIASTOLIC BLOOD PRESSURE: 64 MMHG | BODY MASS INDEX: 41.49 KG/M2

## 2021-12-14 DIAGNOSIS — H35.3211 EXUDATIVE AGE-RELATED MACULAR DEGENERATION, RIGHT EYE, WITH ACTIVE CHOROIDAL NEOVASCULARIZATION: ICD-10-CM

## 2021-12-14 DIAGNOSIS — E78.49 OTHER HYPERLIPIDEMIA: ICD-10-CM

## 2021-12-14 DIAGNOSIS — M19.90 ARTHRITIS: ICD-10-CM

## 2021-12-14 DIAGNOSIS — I70.0 ATHEROSCLEROSIS OF AORTA: ICD-10-CM

## 2021-12-14 DIAGNOSIS — K21.9 GASTROESOPHAGEAL REFLUX DISEASE WITHOUT ESOPHAGITIS: ICD-10-CM

## 2021-12-14 DIAGNOSIS — Z00.00 ENCOUNTER FOR PREVENTIVE HEALTH EXAMINATION: Primary | ICD-10-CM

## 2021-12-14 DIAGNOSIS — N18.30 BENIGN HYPERTENSION WITH CHRONIC KIDNEY DISEASE, STAGE III: ICD-10-CM

## 2021-12-14 DIAGNOSIS — N18.31 STAGE 3A CHRONIC KIDNEY DISEASE: ICD-10-CM

## 2021-12-14 DIAGNOSIS — R73.03 PREDIABETES: ICD-10-CM

## 2021-12-14 DIAGNOSIS — I12.9 BENIGN HYPERTENSION WITH CHRONIC KIDNEY DISEASE, STAGE III: ICD-10-CM

## 2021-12-14 DIAGNOSIS — E66.01 CLASS 3 SEVERE OBESITY DUE TO EXCESS CALORIES WITH SERIOUS COMORBIDITY AND BODY MASS INDEX (BMI) OF 40.0 TO 44.9 IN ADULT: ICD-10-CM

## 2021-12-14 PROBLEM — E66.813 CLASS 3 SEVERE OBESITY DUE TO EXCESS CALORIES WITH SERIOUS COMORBIDITY IN ADULT: Status: ACTIVE | Noted: 2021-12-14

## 2021-12-14 PROCEDURE — 99999 PR PBB SHADOW E&M-EST. PATIENT-LVL IV: ICD-10-PCS | Mod: PBBFAC,,, | Performed by: NURSE PRACTITIONER

## 2021-12-14 PROCEDURE — 99999 PR PBB SHADOW E&M-EST. PATIENT-LVL IV: CPT | Mod: PBBFAC,,, | Performed by: NURSE PRACTITIONER

## 2021-12-14 PROCEDURE — 99214 OFFICE O/P EST MOD 30 MIN: CPT | Mod: PBBFAC,PO | Performed by: NURSE PRACTITIONER

## 2021-12-14 PROCEDURE — G0439 PR MEDICARE ANNUAL WELLNESS SUBSEQUENT VISIT: ICD-10-PCS | Mod: ,,, | Performed by: NURSE PRACTITIONER

## 2021-12-14 PROCEDURE — G0439 PPPS, SUBSEQ VISIT: HCPCS | Mod: ,,, | Performed by: NURSE PRACTITIONER

## 2022-01-12 ENCOUNTER — PROCEDURE VISIT (OUTPATIENT)
Dept: OPHTHALMOLOGY | Facility: CLINIC | Age: 77
End: 2022-01-12
Attending: OPHTHALMOLOGY
Payer: MEDICARE

## 2022-01-12 DIAGNOSIS — H35.3211 EXUDATIVE AGE-RELATED MACULAR DEGENERATION, RIGHT EYE, WITH ACTIVE CHOROIDAL NEOVASCULARIZATION: Primary | ICD-10-CM

## 2022-01-12 PROCEDURE — 92134 CPTRZ OPH DX IMG PST SGM RTA: CPT | Mod: PBBFAC,PO | Performed by: OPHTHALMOLOGY

## 2022-01-12 PROCEDURE — 67028 INJECTION EYE DRUG: CPT | Mod: S$PBB,RT,, | Performed by: OPHTHALMOLOGY

## 2022-01-12 PROCEDURE — 67028 PR INJECT INTRAVITREAL PHARMCOLOGIC: ICD-10-PCS | Mod: S$PBB,RT,, | Performed by: OPHTHALMOLOGY

## 2022-01-12 PROCEDURE — 99499 NO LOS: ICD-10-PCS | Mod: S$PBB,,, | Performed by: OPHTHALMOLOGY

## 2022-01-12 PROCEDURE — 67028 INJECTION EYE DRUG: CPT | Mod: PBBFAC,PO,RT | Performed by: OPHTHALMOLOGY

## 2022-01-12 PROCEDURE — 92134 POSTERIOR SEGMENT OCT RETINA (OCULAR COHERENCE TOMOGRAPHY)-BOTH EYES: ICD-10-PCS | Mod: 26,S$PBB,, | Performed by: OPHTHALMOLOGY

## 2022-01-12 PROCEDURE — 99499 UNLISTED E&M SERVICE: CPT | Mod: S$PBB,,, | Performed by: OPHTHALMOLOGY

## 2022-01-12 RX ADMIN — BEVACIZUMAB 1.25 MG: 100 INJECTION, SOLUTION INTRAVENOUS at 02:01

## 2022-01-12 NOTE — PATIENT INSTRUCTIONS

## 2022-02-08 DIAGNOSIS — N18.30 BENIGN HYPERTENSION WITH CHRONIC KIDNEY DISEASE, STAGE III: ICD-10-CM

## 2022-02-08 DIAGNOSIS — I12.9 BENIGN HYPERTENSION WITH CHRONIC KIDNEY DISEASE, STAGE III: ICD-10-CM

## 2022-02-08 DIAGNOSIS — E78.49 OTHER HYPERLIPIDEMIA: Primary | ICD-10-CM

## 2022-02-08 DIAGNOSIS — R73.03 PREDIABETES: ICD-10-CM

## 2022-02-09 ENCOUNTER — TELEPHONE (OUTPATIENT)
Dept: FAMILY MEDICINE | Facility: CLINIC | Age: 77
End: 2022-02-09
Payer: MEDICARE

## 2022-02-09 NOTE — TELEPHONE ENCOUNTER
----- Message from Lulu Auguste MD sent at 2/8/2022  4:35 PM CST -----  Please call patient to schedule labs and address health maintenance prior to next office visit.

## 2022-02-10 ENCOUNTER — LAB VISIT (OUTPATIENT)
Dept: LAB | Facility: HOSPITAL | Age: 77
End: 2022-02-10
Attending: INTERNAL MEDICINE
Payer: MEDICARE

## 2022-02-10 DIAGNOSIS — N18.30 BENIGN HYPERTENSION WITH CHRONIC KIDNEY DISEASE, STAGE III: ICD-10-CM

## 2022-02-10 DIAGNOSIS — I12.9 BENIGN HYPERTENSION WITH CHRONIC KIDNEY DISEASE, STAGE III: ICD-10-CM

## 2022-02-10 DIAGNOSIS — E78.49 OTHER HYPERLIPIDEMIA: ICD-10-CM

## 2022-02-10 DIAGNOSIS — R73.03 PREDIABETES: ICD-10-CM

## 2022-02-10 LAB
ALBUMIN SERPL BCP-MCNC: 3.8 G/DL (ref 3.5–5.2)
ALP SERPL-CCNC: 52 U/L (ref 55–135)
ALT SERPL W/O P-5'-P-CCNC: 14 U/L (ref 10–44)
ANION GAP SERPL CALC-SCNC: 15 MMOL/L (ref 8–16)
AST SERPL-CCNC: 25 U/L (ref 10–40)
BASOPHILS # BLD AUTO: 0.05 K/UL (ref 0–0.2)
BASOPHILS NFR BLD: 0.7 % (ref 0–1.9)
BILIRUB SERPL-MCNC: 0.5 MG/DL (ref 0.1–1)
BUN SERPL-MCNC: 35 MG/DL (ref 8–23)
CALCIUM SERPL-MCNC: 10.2 MG/DL (ref 8.7–10.5)
CHLORIDE SERPL-SCNC: 107 MMOL/L (ref 95–110)
CHOLEST SERPL-MCNC: 143 MG/DL (ref 120–199)
CHOLEST/HDLC SERPL: 2.9 {RATIO} (ref 2–5)
CO2 SERPL-SCNC: 22 MMOL/L (ref 23–29)
CREAT SERPL-MCNC: 1 MG/DL (ref 0.5–1.4)
DIFFERENTIAL METHOD: ABNORMAL
EOSINOPHIL # BLD AUTO: 0.2 K/UL (ref 0–0.5)
EOSINOPHIL NFR BLD: 2.1 % (ref 0–8)
ERYTHROCYTE [DISTWIDTH] IN BLOOD BY AUTOMATED COUNT: 12.6 % (ref 11.5–14.5)
EST. GFR  (AFRICAN AMERICAN): >60 ML/MIN/1.73 M^2
EST. GFR  (NON AFRICAN AMERICAN): 54.5 ML/MIN/1.73 M^2
ESTIMATED AVG GLUCOSE: 117 MG/DL (ref 68–131)
GLUCOSE SERPL-MCNC: 97 MG/DL (ref 70–110)
HBA1C MFR BLD: 5.7 % (ref 4–5.6)
HCT VFR BLD AUTO: 37.8 % (ref 37–48.5)
HDLC SERPL-MCNC: 49 MG/DL (ref 40–75)
HDLC SERPL: 34.3 % (ref 20–50)
HGB BLD-MCNC: 11.7 G/DL (ref 12–16)
IMM GRANULOCYTES # BLD AUTO: 0.03 K/UL (ref 0–0.04)
IMM GRANULOCYTES NFR BLD AUTO: 0.4 % (ref 0–0.5)
LDLC SERPL CALC-MCNC: 72 MG/DL (ref 63–159)
LYMPHOCYTES # BLD AUTO: 2 K/UL (ref 1–4.8)
LYMPHOCYTES NFR BLD: 28.7 % (ref 18–48)
MCH RBC QN AUTO: 29.8 PG (ref 27–31)
MCHC RBC AUTO-ENTMCNC: 31 G/DL (ref 32–36)
MCV RBC AUTO: 96 FL (ref 82–98)
MONOCYTES # BLD AUTO: 0.5 K/UL (ref 0.3–1)
MONOCYTES NFR BLD: 7.2 % (ref 4–15)
NEUTROPHILS # BLD AUTO: 4.3 K/UL (ref 1.8–7.7)
NEUTROPHILS NFR BLD: 60.9 % (ref 38–73)
NONHDLC SERPL-MCNC: 94 MG/DL
NRBC BLD-RTO: 0 /100 WBC
PLATELET # BLD AUTO: 255 K/UL (ref 150–450)
PMV BLD AUTO: 11.5 FL (ref 9.2–12.9)
POTASSIUM SERPL-SCNC: 4.5 MMOL/L (ref 3.5–5.1)
PROT SERPL-MCNC: 7.5 G/DL (ref 6–8.4)
RBC # BLD AUTO: 3.93 M/UL (ref 4–5.4)
SODIUM SERPL-SCNC: 144 MMOL/L (ref 136–145)
TRIGL SERPL-MCNC: 110 MG/DL (ref 30–150)
WBC # BLD AUTO: 7.05 K/UL (ref 3.9–12.7)

## 2022-02-10 PROCEDURE — 80061 LIPID PANEL: CPT | Performed by: INTERNAL MEDICINE

## 2022-02-10 PROCEDURE — 83036 HEMOGLOBIN GLYCOSYLATED A1C: CPT | Performed by: INTERNAL MEDICINE

## 2022-02-10 PROCEDURE — 36415 COLL VENOUS BLD VENIPUNCTURE: CPT | Mod: PO | Performed by: INTERNAL MEDICINE

## 2022-02-10 PROCEDURE — 80053 COMPREHEN METABOLIC PANEL: CPT | Performed by: INTERNAL MEDICINE

## 2022-02-10 PROCEDURE — 85025 COMPLETE CBC W/AUTO DIFF WBC: CPT | Performed by: INTERNAL MEDICINE

## 2022-02-14 ENCOUNTER — OFFICE VISIT (OUTPATIENT)
Dept: FAMILY MEDICINE | Facility: CLINIC | Age: 77
End: 2022-02-14
Payer: MEDICARE

## 2022-02-14 VITALS
BODY MASS INDEX: 38.07 KG/M2 | OXYGEN SATURATION: 98 % | DIASTOLIC BLOOD PRESSURE: 56 MMHG | WEIGHT: 206.88 LBS | HEIGHT: 62 IN | SYSTOLIC BLOOD PRESSURE: 134 MMHG | HEART RATE: 75 BPM | TEMPERATURE: 98 F

## 2022-02-14 DIAGNOSIS — I70.0 ATHEROSCLEROSIS OF AORTA: ICD-10-CM

## 2022-02-14 DIAGNOSIS — E78.49 OTHER HYPERLIPIDEMIA: ICD-10-CM

## 2022-02-14 DIAGNOSIS — E66.01 CLASS 3 SEVERE OBESITY DUE TO EXCESS CALORIES WITH SERIOUS COMORBIDITY AND BODY MASS INDEX (BMI) OF 40.0 TO 44.9 IN ADULT: ICD-10-CM

## 2022-02-14 DIAGNOSIS — K21.9 GASTROESOPHAGEAL REFLUX DISEASE WITHOUT ESOPHAGITIS: ICD-10-CM

## 2022-02-14 DIAGNOSIS — N18.30 BENIGN HYPERTENSION WITH CHRONIC KIDNEY DISEASE, STAGE III: Primary | ICD-10-CM

## 2022-02-14 DIAGNOSIS — Z12.31 ENCOUNTER FOR SCREENING MAMMOGRAM FOR BREAST CANCER: ICD-10-CM

## 2022-02-14 DIAGNOSIS — Z23 NEED FOR SHINGLES VACCINE: ICD-10-CM

## 2022-02-14 DIAGNOSIS — H35.3211 EXUDATIVE AGE-RELATED MACULAR DEGENERATION, RIGHT EYE, WITH ACTIVE CHOROIDAL NEOVASCULARIZATION: ICD-10-CM

## 2022-02-14 DIAGNOSIS — Z23 NEED FOR TDAP VACCINATION: ICD-10-CM

## 2022-02-14 DIAGNOSIS — I12.9 BENIGN HYPERTENSION WITH CHRONIC KIDNEY DISEASE, STAGE III: Primary | ICD-10-CM

## 2022-02-14 DIAGNOSIS — R73.03 PREDIABETES: ICD-10-CM

## 2022-02-14 PROCEDURE — 99214 OFFICE O/P EST MOD 30 MIN: CPT | Mod: PBBFAC,PO | Performed by: INTERNAL MEDICINE

## 2022-02-14 PROCEDURE — 99999 PR PBB SHADOW E&M-EST. PATIENT-LVL IV: CPT | Mod: PBBFAC,,, | Performed by: INTERNAL MEDICINE

## 2022-02-14 PROCEDURE — 99214 OFFICE O/P EST MOD 30 MIN: CPT | Mod: S$PBB,,, | Performed by: INTERNAL MEDICINE

## 2022-02-14 PROCEDURE — 99214 PR OFFICE/OUTPT VISIT, EST, LEVL IV, 30-39 MIN: ICD-10-PCS | Mod: S$PBB,,, | Performed by: INTERNAL MEDICINE

## 2022-02-14 PROCEDURE — 99999 PR PBB SHADOW E&M-EST. PATIENT-LVL IV: ICD-10-PCS | Mod: PBBFAC,,, | Performed by: INTERNAL MEDICINE

## 2022-02-14 RX ORDER — OMEPRAZOLE 40 MG/1
40 CAPSULE, DELAYED RELEASE ORAL DAILY
Qty: 90 CAPSULE | Refills: 0 | Status: SHIPPED | OUTPATIENT
Start: 2022-02-14 | End: 2023-01-24 | Stop reason: SDUPTHER

## 2022-02-14 RX ORDER — PRAVASTATIN SODIUM 10 MG/1
10 TABLET ORAL DAILY
Qty: 90 TABLET | Refills: 3 | Status: SHIPPED | OUTPATIENT
Start: 2022-02-14 | End: 2023-01-24 | Stop reason: SDUPTHER

## 2022-02-14 RX ORDER — HYDROCHLOROTHIAZIDE 25 MG/1
25 TABLET ORAL DAILY
Qty: 90 TABLET | Refills: 3 | Status: SHIPPED | OUTPATIENT
Start: 2022-02-14 | End: 2023-01-24 | Stop reason: SDUPTHER

## 2022-02-14 RX ORDER — LISINOPRIL 40 MG/1
40 TABLET ORAL DAILY
Qty: 90 TABLET | Refills: 3 | Status: SHIPPED | OUTPATIENT
Start: 2022-02-14 | End: 2023-01-24 | Stop reason: SDUPTHER

## 2022-02-14 RX ORDER — FENOFIBRATE 145 MG/1
145 TABLET, FILM COATED ORAL DAILY
Qty: 90 TABLET | Refills: 3 | Status: SHIPPED | OUTPATIENT
Start: 2022-02-14 | End: 2023-01-24 | Stop reason: SDUPTHER
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
## 2022-02-16 ENCOUNTER — PROCEDURE VISIT (OUTPATIENT)
Dept: OPHTHALMOLOGY | Facility: CLINIC | Age: 77
End: 2022-02-16
Attending: OPHTHALMOLOGY
Payer: MEDICARE

## 2022-02-16 DIAGNOSIS — H35.3211 EXUDATIVE AGE-RELATED MACULAR DEGENERATION, RIGHT EYE, WITH ACTIVE CHOROIDAL NEOVASCULARIZATION: Primary | ICD-10-CM

## 2022-02-16 PROCEDURE — 99499 UNLISTED E&M SERVICE: CPT | Mod: S$PBB,,, | Performed by: OPHTHALMOLOGY

## 2022-02-16 PROCEDURE — 67028 PR INJECT INTRAVITREAL PHARMCOLOGIC: ICD-10-PCS | Mod: S$PBB,RT,, | Performed by: OPHTHALMOLOGY

## 2022-02-16 PROCEDURE — 67028 INJECTION EYE DRUG: CPT | Mod: PBBFAC,PO,RT | Performed by: OPHTHALMOLOGY

## 2022-02-16 PROCEDURE — 92134 POSTERIOR SEGMENT OCT RETINA (OCULAR COHERENCE TOMOGRAPHY)-BOTH EYES: ICD-10-PCS | Mod: 26,S$PBB,, | Performed by: OPHTHALMOLOGY

## 2022-02-16 PROCEDURE — 92134 CPTRZ OPH DX IMG PST SGM RTA: CPT | Mod: PBBFAC,PO | Performed by: OPHTHALMOLOGY

## 2022-02-16 PROCEDURE — 99499 NO LOS: ICD-10-PCS | Mod: S$PBB,,, | Performed by: OPHTHALMOLOGY

## 2022-02-16 PROCEDURE — 67028 INJECTION EYE DRUG: CPT | Mod: S$PBB,RT,, | Performed by: OPHTHALMOLOGY

## 2022-02-16 RX ADMIN — BEVACIZUMAB 1.25 MG: 100 INJECTION, SOLUTION INTRAVENOUS at 03:02

## 2022-02-16 NOTE — PROGRESS NOTES
Subjective:       Patient ID: Francoise De Jesus is a 76 y.o. female      No chief complaint on file.    History of Present Illness  HPI     1 mo OCT/ Avastin OD (INJECTION ONLY)    DLS: 01/12/2022 by Dr. ARIELLE Potter MD    75 Y/o female is here for 1 month OCT/ KEVIN OD     Feels vision steadily improving, sees TV better now.  Va OS stable/good  No tearing   No itching   No burning   No flashes   No floaters     Eye med: Restasis PRN OD    POHx:   1. Exudative ARMD  OD w/ Active Choroidal NVO  S/P Avastin OD (01/12/2022)    Last edited by Enrique Potter MD on 2/16/2022  3:33 PM. (History)        Imaging:    See report    Assessment/Plan:     1. Exudative age-related macular degeneration, right eye, with active choroidal neovascularization  Improved again s/p Av #3  SRH less dense but still sig  Subj improvement also  Recommend cont Av monthly until stops improving    - Prior authorization Order  - Posterior Segment OCT Retina-Both eyes    Follow up in about 1 month (around 3/16/2022), or if symptoms worsen or fail to improve, for OCT and INJECTION ONLY, Injection Right eye, Avastin.     Patient identified.  Timeout performed.    Risks, benefits, and alternatives to treatment were discussed in detail with the patient, including bleeding/infection (endophthalmitis)/etc.  The patient voiced understanding and wished to proceed with the procedure.  See separate consent form.    Injection Procedure Note:  Diagnosis: Wet AMD Right Eye    Topical Proparacaine drop placed then topical 5% Betadine  Sterile gloves used, and sterile lid speculum placed.  5% Betadine placed at injection site again prior to injection.  Avastin 1.25mg in 0.05cc Injected inferotemporally 3.5-4mm posterior to the limbus.  Complications: None  Va at least CF at 5 feet post injection.  Retina, ONH, IOP normal after injection.    Followup as above.  Patient should return immediately PRN.  Retinal Detachment and Endophthalmitis precautions given.

## 2022-02-16 NOTE — PATIENT INSTRUCTIONS

## 2022-03-23 ENCOUNTER — PROCEDURE VISIT (OUTPATIENT)
Dept: OPHTHALMOLOGY | Facility: CLINIC | Age: 77
End: 2022-03-23
Attending: OPHTHALMOLOGY
Payer: MEDICARE

## 2022-03-23 DIAGNOSIS — H35.3231 EXUDATIVE AGE-RELATED MACULAR DEGENERATION OF BOTH EYES WITH ACTIVE CHOROIDAL NEOVASCULARIZATION: Primary | ICD-10-CM

## 2022-03-23 PROCEDURE — 92134 CPTRZ OPH DX IMG PST SGM RTA: CPT | Mod: PBBFAC,PO | Performed by: OPHTHALMOLOGY

## 2022-03-23 PROCEDURE — 99214 PR OFFICE/OUTPT VISIT, EST, LEVL IV, 30-39 MIN: ICD-10-PCS | Mod: 25,S$PBB,, | Performed by: OPHTHALMOLOGY

## 2022-03-23 PROCEDURE — 99214 OFFICE O/P EST MOD 30 MIN: CPT | Mod: 25,S$PBB,, | Performed by: OPHTHALMOLOGY

## 2022-03-23 PROCEDURE — 92134 POSTERIOR SEGMENT OCT RETINA (OCULAR COHERENCE TOMOGRAPHY)-BOTH EYES: ICD-10-PCS | Mod: 26,S$PBB,, | Performed by: OPHTHALMOLOGY

## 2022-03-23 PROCEDURE — 67028 PR INJECT INTRAVITREAL PHARMCOLOGIC: ICD-10-PCS | Mod: S$PBB,LT,, | Performed by: OPHTHALMOLOGY

## 2022-03-23 PROCEDURE — 67028 INJECTION EYE DRUG: CPT | Mod: S$PBB,LT,, | Performed by: OPHTHALMOLOGY

## 2022-03-23 PROCEDURE — 67028 INJECTION EYE DRUG: CPT | Mod: PBBFAC,PO,LT | Performed by: OPHTHALMOLOGY

## 2022-03-23 RX ADMIN — BEVACIZUMAB 1.25 MG: 100 INJECTION, SOLUTION INTRAVENOUS at 12:03

## 2022-03-24 NOTE — PROGRESS NOTES
Subjective:       Patient ID: Francoise De Jesus is a 76 y.o. female      Chief Complaint   Patient presents with    Blurred Vision    Injections     History of Present Illness  HPI     1 mo OCT/ Avastin OD (INJECTION ONLY)    DLS: 02/16/2022 by Dr. ARIELLE Potter MD    77 Y/o female reports : no new changes and I feel I am seeing better OD   but still blurry, sees more of VF OD now.  OS seems about the same.  No eye pain   No blurred VA  No itching   No burning   No flashes   No floaters     Eye med: Restasis PRN OD    POHx:   1. Exudative ARMD  OD w/ Active Choroidal NVO  S/P Avastin OD (02/16/2022)    Last edited by Enrique Potter MD on 3/23/2022 12:27 PM. (History)        Imaging:    See report    Assessment/Plan:     1. Exudative age-related macular degeneration of both eyes with active choroidal neovascularization  New exudative conversion today OS.  OD is improving with monthly Av obj and subj  Rec Av OU today.  Pt reluctant for OU so will inj OS only    RTC for inj OD    RBA to inj discussed again  - Prior authorization Order  - Posterior Segment OCT Retina-Both eyes    Follow up in about 1 week (around 3/30/2022) for Injection Right eye, Avastin.     Patient identified.  Timeout performed.    Risks, benefits, and alternatives to treatment were discussed in detail with the patient, including bleeding/infection (endophthalmitis)/etc.  The patient voiced understanding and wished to proceed with the procedure.  See separate consent form.    Injection Procedure Note:  Diagnosis: Wet AMD Left Eye    Topical Proparacaine drop placed then topical 5% Betadine  Sterile gloves used, and sterile lid speculum placed.  5% Betadine placed at injection site again prior to injection.  Avastin 1.25mg in 0.05cc Injected inferotemporally 3.5-4mm posterior to the limbus.  Complications: None  Va at least CF at 5 feet post injection.  Retina, ONH, IOP normal after injection.    Followup as above.  Patient should return  immediately PRN.  Retinal Detachment and Endophthalmitis precautions given.

## 2022-03-30 ENCOUNTER — PROCEDURE VISIT (OUTPATIENT)
Dept: OPHTHALMOLOGY | Facility: CLINIC | Age: 77
End: 2022-03-30
Attending: OPHTHALMOLOGY
Payer: MEDICARE

## 2022-03-30 DIAGNOSIS — H35.3231 EXUDATIVE AGE-RELATED MACULAR DEGENERATION OF BOTH EYES WITH ACTIVE CHOROIDAL NEOVASCULARIZATION: Primary | ICD-10-CM

## 2022-03-30 PROCEDURE — 67028 INJECTION EYE DRUG: CPT | Mod: S$PBB,RT,, | Performed by: OPHTHALMOLOGY

## 2022-03-30 PROCEDURE — 99499 NO LOS: ICD-10-PCS | Mod: S$PBB,,, | Performed by: OPHTHALMOLOGY

## 2022-03-30 PROCEDURE — 67028 PR INJECT INTRAVITREAL PHARMCOLOGIC: ICD-10-PCS | Mod: S$PBB,RT,, | Performed by: OPHTHALMOLOGY

## 2022-03-30 PROCEDURE — 67028 INJECTION EYE DRUG: CPT | Mod: PBBFAC,PO,RT | Performed by: OPHTHALMOLOGY

## 2022-03-30 PROCEDURE — 99499 UNLISTED E&M SERVICE: CPT | Mod: S$PBB,,, | Performed by: OPHTHALMOLOGY

## 2022-03-30 RX ADMIN — BEVACIZUMAB 1.25 MG: 100 INJECTION, SOLUTION INTRAVENOUS at 11:03

## 2022-03-30 NOTE — PATIENT INSTRUCTIONS

## 2022-03-30 NOTE — PROGRESS NOTES
Subjective:       Patient ID: Francoise De Jesus is a 76 y.o. female      Chief Complaint   Patient presents with    Injections     History of Present Illness  HPI     1 wk Avastin OD (INJECTION ONLY)    DLS:03/23/2022 by Dr. ARIELLE Potter MD    77 Y/o female reports : no complication after last visit. Feels as though   vision is doing pretty well  Here for Avastin OD    No blurred VA  No itching   No burning   No flashes   No floaters     Eye med: Restasis PRN OD    POHx:   1. Exudative ARMD  OD w/ Active Choroidal NVO  S/P Avastin OD (02/16/2022)  S/P Avastin OS ( 03/23/2022)    Last edited by Enrique Potter MD on 3/30/2022 11:36 AM. (History)        Imaging:    See report    Assessment/Plan:     1. Exudative age-related macular degeneration of both eyes with active choroidal neovascularization  1 wks s/p Av#1 OS  OD is improving with monthly Av obj and subj  Proceed with Av OD today  Pt now open to injection OU same day    Will inj OD today and OU in 4 wks      - Prior authorization Order  - Posterior Segment OCT Retina-Both eyes    Follow up in about 4 weeks (around 4/27/2022), or if symptoms worsen or fail to improve, for OCT Mac, Injection Right eye, Injection Left eye, Avastin.     Patient identified.  Timeout performed.    Risks, benefits, and alternatives to treatment were discussed in detail with the patient, including bleeding/infection (endophthalmitis)/etc.  The patient voiced understanding and wished to proceed with the procedure.  See separate consent form.    Injection Procedure Note:  Diagnosis: Wet AMD Right Eye    Topical Proparacaine drop placed then topical 5% Betadine  Sterile gloves used, and sterile lid speculum placed.  5% Betadine placed at injection site again prior to injection.  Avastin 1.25mg in 0.05cc Injected inferotemporally 3.5-4mm posterior to the limbus.  Complications: None  Va at least CF at 5 feet post injection.  Retina, ONH, IOP normal after injection.    Followup as above.   Patient should return immediately PRN.  Retinal Detachment and Endophthalmitis precautions given.

## 2022-05-04 ENCOUNTER — PROCEDURE VISIT (OUTPATIENT)
Dept: OPHTHALMOLOGY | Facility: CLINIC | Age: 77
End: 2022-05-04
Attending: OPHTHALMOLOGY
Payer: MEDICARE

## 2022-05-04 DIAGNOSIS — H35.3231 EXUDATIVE AGE-RELATED MACULAR DEGENERATION OF BOTH EYES WITH ACTIVE CHOROIDAL NEOVASCULARIZATION: Primary | ICD-10-CM

## 2022-05-04 PROCEDURE — 99499 UNLISTED E&M SERVICE: CPT | Mod: S$PBB,,, | Performed by: OPHTHALMOLOGY

## 2022-05-04 PROCEDURE — 92134 POSTERIOR SEGMENT OCT RETINA (OCULAR COHERENCE TOMOGRAPHY)-BOTH EYES: ICD-10-PCS | Mod: 26,S$PBB,, | Performed by: OPHTHALMOLOGY

## 2022-05-04 PROCEDURE — 99499 NO LOS: ICD-10-PCS | Mod: S$PBB,,, | Performed by: OPHTHALMOLOGY

## 2022-05-04 PROCEDURE — 67028 INJECTION EYE DRUG: CPT | Mod: 50,S$PBB,, | Performed by: OPHTHALMOLOGY

## 2022-05-04 PROCEDURE — 92134 CPTRZ OPH DX IMG PST SGM RTA: CPT | Mod: PBBFAC,PO | Performed by: OPHTHALMOLOGY

## 2022-05-04 PROCEDURE — 67028 INJECTION EYE DRUG: CPT | Mod: 50,PBBFAC,PO | Performed by: OPHTHALMOLOGY

## 2022-05-04 PROCEDURE — 67028 PR INJECT INTRAVITREAL PHARMCOLOGIC: ICD-10-PCS | Mod: 50,S$PBB,, | Performed by: OPHTHALMOLOGY

## 2022-05-04 RX ADMIN — Medication 1.25 MG: at 12:05

## 2022-05-04 RX ADMIN — BEVACIZUMAB 1.25 MG: 100 INJECTION, SOLUTION INTRAVENOUS at 12:05

## 2022-05-04 NOTE — PROGRESS NOTES
Subjective:       Patient ID: Francoise De Jesus is a 76 y.o. female      No chief complaint on file.    History of Present Illness  HPI     77 Y/o female is here for OCT an OU only pt states vision is stable OU  Pt denies pain and discomfort   No f/f    Eye med: Refresh OU PRN     Last edited by Enrique Potter MD on 5/4/2022 12:56 PM. (History)        Imaging:    See report    Assessment/Plan:     1. Exudative age-related macular degeneration of both eyes with active choroidal neovascularization  6 wks s/p Av#1 OS.  Improved  OD is improving with monthly Av obj and subj  Proceed with Av OU today  Pt now open to injection OU same day    Will inj OU today and OU in 1 month  TREX when stops improving    - Prior authorization Order  - Posterior Segment OCT Retina-Both eyes    Follow up in about 1 month (around 6/4/2022), or if symptoms worsen or fail to improve, for OCT and INJECTION ONLY, Injection Right eye, Injection Left eye, Avastin.     Patient identified.  Timeout performed.    Risks, benefits, and alternatives to treatment were discussed in detail with the patient, including bleeding/infection (endophthalmitis)/etc.  The patient voiced understanding and wished to proceed with the procedure.  See separate consent form.    Injection Procedure Note:  Diagnosis: Wet AMD Right Eye    Topical Proparacaine drop placed then topical 5% Betadine  Sterile gloves used, and sterile lid speculum placed.  5% Betadine placed at injection site again prior to injection.  Avastin 1.25mg in 0.05cc Injected inferotemporally 3.5-4mm posterior to the limbus.  Complications: None  Va at least CF at 5 feet post injection.  Retina, ONH, IOP normal after injection.    SEPARATE PREP    Injection Procedure Note:  Diagnosis: Wet AMD Left Eye    Topical Proparacaine drop placed then topical 5% Betadine  Sterile gloves used, and sterile lid speculum placed.  5% Betadine placed at injection site again prior to injection.  Avastin 1.25mg in  0.05cc Injected inferotemporally 3.5-4mm posterior to the limbus.  Complications: None  Va at least CF at 5 feet post injection.  Retina, ONH, IOP normal after injection.    Followup as above.  Patient should return immediately PRN.  Retinal Detachment and Endophthalmitis precautions given.

## 2022-05-05 NOTE — PATIENT INSTRUCTIONS

## 2022-06-08 ENCOUNTER — PROCEDURE VISIT (OUTPATIENT)
Dept: OPHTHALMOLOGY | Facility: CLINIC | Age: 77
End: 2022-06-08
Attending: OPHTHALMOLOGY
Payer: MEDICARE

## 2022-06-08 DIAGNOSIS — H35.3231 EXUDATIVE AGE-RELATED MACULAR DEGENERATION OF BOTH EYES WITH ACTIVE CHOROIDAL NEOVASCULARIZATION: Primary | ICD-10-CM

## 2022-06-08 PROCEDURE — 67028 PR INJECT INTRAVITREAL PHARMCOLOGIC: ICD-10-PCS | Mod: 50,S$PBB,, | Performed by: OPHTHALMOLOGY

## 2022-06-08 PROCEDURE — 99499 NO LOS: ICD-10-PCS | Mod: S$PBB,,, | Performed by: OPHTHALMOLOGY

## 2022-06-08 PROCEDURE — 92134 POSTERIOR SEGMENT OCT RETINA (OCULAR COHERENCE TOMOGRAPHY)-BOTH EYES: ICD-10-PCS | Mod: 26,S$PBB,, | Performed by: OPHTHALMOLOGY

## 2022-06-08 PROCEDURE — 92134 CPTRZ OPH DX IMG PST SGM RTA: CPT | Mod: PBBFAC,PO | Performed by: OPHTHALMOLOGY

## 2022-06-08 PROCEDURE — 67028 INJECTION EYE DRUG: CPT | Mod: 50,S$PBB,, | Performed by: OPHTHALMOLOGY

## 2022-06-08 PROCEDURE — 99499 UNLISTED E&M SERVICE: CPT | Mod: S$PBB,,, | Performed by: OPHTHALMOLOGY

## 2022-06-08 PROCEDURE — 67028 INJECTION EYE DRUG: CPT | Mod: 50,PBBFAC,PO | Performed by: OPHTHALMOLOGY

## 2022-06-08 RX ADMIN — Medication 1.25 MG: at 02:06

## 2022-06-08 RX ADMIN — BEVACIZUMAB 1.25 MG: 100 INJECTION, SOLUTION INTRAVENOUS at 02:06

## 2022-06-08 NOTE — PATIENT INSTRUCTIONS

## 2022-06-08 NOTE — PROGRESS NOTES
Subjective:       Patient ID: Francoise De Jesus is a 76 y.o. female      Chief Complaint   Patient presents with    Injections     History of Present Illness  HPI     1 mo  Avastin OU (INJECTION ONLY)    DLS:05/04/2022 by Dr. ARIELLE Potter MD    75 Y/o female reports: Vision has gotten min better OD since last inj.  OS   no new changes and I use a magnifier for smaller print.     No blurred VA  No itching   No burning   No flashes   No floaters     Eye med: Restasis PRN OD    POHx:   1. Exudative ARMD  OD w/ Active Choroidal NVO  S/P Avastin OD (02/16/2022)  S/P Avastin OS ( 03/23/2022)  S/P Avastin OU (05/04/2022)     Last edited by Enrique Potter MD on 6/8/2022  2:15 PM. (History)        Imaging:    See report    Assessment/Plan:     1. Exudative age-related macular degeneration of both eyes with active choroidal neovascularization  6 wks s/p Av#2 OS.  Improved then stable since last inj  OD is improving with monthly Av still subj    Proceed with Av OU today    Will inj OU today and OU in 1 month  Will very likely TREX next visit since     - Prior authorization Order  - Posterior Segment OCT Retina-Both eyes    Follow up in about 1 month (around 7/8/2022), or if symptoms worsen or fail to improve, for OCT and INJECTION ONLY, Injection Right eye, Injection Left eye, Avastin.     Patient identified.  Timeout performed.    Risks, benefits, and alternatives to treatment were discussed in detail with the patient, including bleeding/infection (endophthalmitis)/etc.  The patient voiced understanding and wished to proceed with the procedure.  See separate consent form.    Injection Procedure Note:  Diagnosis: Wet AMD Right Eye    Topical Proparacaine drop placed then topical 5% Betadine  Sterile gloves used, and sterile lid speculum placed.  5% Betadine placed at injection site again prior to injection.  Avastin 1.25mg in 0.05cc Injected inferotemporally 3.5-4mm posterior to the limbus.  Complications: None  Va at least  CF at 5 feet post injection.  Retina, ONH, IOP normal after injection.    SEPARATE PREP    Injection Procedure Note:  Diagnosis: Wet AMD Left Eye    Topical Proparacaine drop placed then topical 5% Betadine  Sterile gloves used, and sterile lid speculum placed.  5% Betadine placed at injection site again prior to injection.  Avastin 1.25mg in 0.05cc Injected inferotemporally 3.5-4mm posterior to the limbus.  Complications: None  Va at least CF at 5 feet post injection.  Retina, ONH, IOP normal after injection.    Followup as above.  Patient should return immediately PRN.  Retinal Detachment and Endophthalmitis precautions given.

## 2022-07-06 ENCOUNTER — PROCEDURE VISIT (OUTPATIENT)
Dept: OPHTHALMOLOGY | Facility: CLINIC | Age: 77
End: 2022-07-06
Attending: OPHTHALMOLOGY
Payer: MEDICARE

## 2022-07-06 DIAGNOSIS — H35.3231 EXUDATIVE AGE-RELATED MACULAR DEGENERATION OF BOTH EYES WITH ACTIVE CHOROIDAL NEOVASCULARIZATION: Primary | ICD-10-CM

## 2022-07-06 PROCEDURE — 92134 POSTERIOR SEGMENT OCT RETINA (OCULAR COHERENCE TOMOGRAPHY)-BOTH EYES: ICD-10-PCS | Mod: 26,S$PBB,, | Performed by: OPHTHALMOLOGY

## 2022-07-06 PROCEDURE — 67028 PR INJECT INTRAVITREAL PHARMCOLOGIC: ICD-10-PCS | Mod: 50,S$PBB,, | Performed by: OPHTHALMOLOGY

## 2022-07-06 PROCEDURE — 67028 INJECTION EYE DRUG: CPT | Mod: 50,PBBFAC,PO | Performed by: OPHTHALMOLOGY

## 2022-07-06 PROCEDURE — 99499 NO LOS: ICD-10-PCS | Mod: S$PBB,,, | Performed by: OPHTHALMOLOGY

## 2022-07-06 PROCEDURE — 67028 INJECTION EYE DRUG: CPT | Mod: 50,S$PBB,, | Performed by: OPHTHALMOLOGY

## 2022-07-06 PROCEDURE — 99499 UNLISTED E&M SERVICE: CPT | Mod: S$PBB,,, | Performed by: OPHTHALMOLOGY

## 2022-07-06 PROCEDURE — 92134 CPTRZ OPH DX IMG PST SGM RTA: CPT | Mod: PBBFAC,PO | Performed by: OPHTHALMOLOGY

## 2022-07-06 RX ADMIN — Medication 1.25 MG: at 02:07

## 2022-07-06 RX ADMIN — BEVACIZUMAB 1.25 MG: 100 INJECTION, SOLUTION INTRAVENOUS at 02:07

## 2022-07-06 NOTE — PATIENT INSTRUCTIONS

## 2022-07-06 NOTE — PROGRESS NOTES
Subjective:       Patient ID: Francoise De Jesus is a 76 y.o. female      Chief Complaint   Patient presents with    Procedure     Avastin ou     History of Present Illness  HPI     Procedure      Additional comments: Avastin ou              Comments     DLS: 6/8/22    Pt here for 1 month follow up and Avastin OU today  Pt states no changes since last exam and feels vision is about the same.     1. Wet ARMD OU with active CNV  -S/p Avastin OD (6/8/22)  -S/p Avastin OS (6/8/22)          Last edited by Enrique Potter MD on 7/6/2022  2:26 PM. (History)        Imaging:    See report    Assessment/Plan:     1. Exudative age-related macular degeneration of both eyes with active choroidal neovascularization  4 wks s/p Av OU.  OS Improved and now stable  OD with stable SR scar    Proceed with Av OU today    Will inj OU today and TREX to 5 wks    TREX as able     Refraction  Pt also requesting resend  referral    - Prior authorization Order  - Posterior Segment OCT Retina-Both eyes    Follow up in about 5 weeks (around 8/10/2022), or if symptoms worsen or fail to improve, for OCT and INJECTION ONLY, Injection Right eye, Injection Left eye, Avastin.     Patient identified.  Timeout performed.    Risks, benefits, and alternatives to treatment were discussed in detail with the patient, including bleeding/infection (endophthalmitis)/etc.  The patient voiced understanding and wished to proceed with the procedure.  See separate consent form.    Injection Procedure Note:  Diagnosis: Wet AMD Right Eye    Topical Proparacaine drop placed then topical 5% Betadine  Sterile gloves used, and sterile lid speculum placed.  5% Betadine placed at injection site again prior to injection.  Avastin 1.25mg in 0.05cc Injected inferotemporally 3.5-4mm posterior to the limbus.  Complications: None  Va at least CF at 5 feet post injection.  Retina, ONH, IOP normal after injection.    SEPARATE PREP    Injection Procedure Note:  Diagnosis: Wet AMD  Left Eye    Topical Proparacaine drop placed then topical 5% Betadine  Sterile gloves used, and sterile lid speculum placed.  5% Betadine placed at injection site again prior to injection.  Avastin 1.25mg in 0.05cc Injected inferotemporally 3.5-4mm posterior to the limbus.  Complications: None  Va at least CF at 5 feet post injection.  Retina, ONH, IOP normal after injection.    Followup as above.  Patient should return immediately PRN.  Retinal Detachment and Endophthalmitis precautions given.

## 2022-08-17 ENCOUNTER — PROCEDURE VISIT (OUTPATIENT)
Dept: OPHTHALMOLOGY | Facility: CLINIC | Age: 77
End: 2022-08-17
Attending: OPHTHALMOLOGY
Payer: MEDICARE

## 2022-08-17 DIAGNOSIS — H35.3231 EXUDATIVE AGE-RELATED MACULAR DEGENERATION OF BOTH EYES WITH ACTIVE CHOROIDAL NEOVASCULARIZATION: Primary | ICD-10-CM

## 2022-08-17 PROCEDURE — 99499 UNLISTED E&M SERVICE: CPT | Mod: S$PBB,,, | Performed by: OPHTHALMOLOGY

## 2022-08-17 PROCEDURE — 67028 INJECTION EYE DRUG: CPT | Mod: 50,PBBFAC,PO | Performed by: OPHTHALMOLOGY

## 2022-08-17 PROCEDURE — 99499 NO LOS: ICD-10-PCS | Mod: S$PBB,,, | Performed by: OPHTHALMOLOGY

## 2022-08-17 PROCEDURE — 92134 CPTRZ OPH DX IMG PST SGM RTA: CPT | Mod: PBBFAC,PO | Performed by: OPHTHALMOLOGY

## 2022-08-17 PROCEDURE — 92134 POSTERIOR SEGMENT OCT RETINA (OCULAR COHERENCE TOMOGRAPHY)-BOTH EYES: ICD-10-PCS | Mod: 26,S$PBB,, | Performed by: OPHTHALMOLOGY

## 2022-08-17 PROCEDURE — 67028 INJECTION EYE DRUG: CPT | Mod: 50,S$PBB,, | Performed by: OPHTHALMOLOGY

## 2022-08-17 PROCEDURE — 67028 PR INJECT INTRAVITREAL PHARMCOLOGIC: ICD-10-PCS | Mod: 50,S$PBB,, | Performed by: OPHTHALMOLOGY

## 2022-08-17 RX ADMIN — BEVACIZUMAB 1.25 MG: 100 INJECTION, SOLUTION INTRAVENOUS at 03:08

## 2022-08-17 RX ADMIN — Medication 1.25 MG: at 03:08

## 2022-08-17 NOTE — PATIENT INSTRUCTIONS

## 2022-08-17 NOTE — PROGRESS NOTES
Subjective:       Patient ID: Francosie De Jesus is a 76 y.o. female      No chief complaint on file.    History of Present Illness  HPI     1 mo  Avastin OU (INJECTION ONLY)    DLS: 07/06/2022 by Dr. ARIELLE Potter MD    77 Y/o female reports:  vision is good and I use my magnifier but it   doesn't help much.     No blurred VA  No eye pain   No diplopia  No flashes //floaters   No headaches    Eye med: Restasis PRN OU    POHx:   1. Exudative ARMD  OD w/ Active Choroidal NVO  S/P Avastin OD (02/16/2022)  S/P Avastin OS ( 03/23/2022)  S/P Avastin OU (05/04/2022) (07/06/2022)    Last edited by Enrique Potter MD on 8/17/2022  3:37 PM. (History)        Imaging:    See report    Assessment/Plan:     1. Exudative age-related macular degeneration of both eyes with active choroidal neovascularization  6 wks s/p Av OU.  Was intending for 5 wks  Stabe OU  OD with stable SR scar    Proceed with Av OU today    Will inj OU today and stay at 6 wks today since had rapid extension.  If stable next visit will TREX to 7 wks    TREX as able     Refraction  Pt is on  waiting list    - Prior authorization Order  - Posterior Segment OCT Retina-Both eyes    Follow up in about 6 weeks (around 9/28/2022), or if symptoms worsen or fail to improve, for OCT and INJECTION ONLY, Injection Right eye, Injection Left eye, Avastin.     Patient identified.  Timeout performed.    Risks, benefits, and alternatives to treatment were discussed in detail with the patient, including bleeding/infection (endophthalmitis)/etc.  The patient voiced understanding and wished to proceed with the procedure.  See separate consent form.    Injection Procedure Note:  Diagnosis: Wet AMD Right Eye    Topical Proparacaine drop placed then topical 5% Betadine  Sterile gloves used, and sterile lid speculum placed.  5% Betadine placed at injection site again prior to injection.  Avastin 1.25mg in 0.05cc Injected inferotemporally 3.5-4mm posterior to the  limbus.  Complications: None  Va at least CF at 5 feet post injection.  Retina, ONH, IOP normal after injection.    SEPARATE PREP    Injection Procedure Note:  Diagnosis: Wet AMD Left Eye    Topical Proparacaine drop placed then topical 5% Betadine  Sterile gloves used, and sterile lid speculum placed.  5% Betadine placed at injection site again prior to injection.  Avastin 1.25mg in 0.05cc Injected inferotemporally 3.5-4mm posterior to the limbus.  Complications: None  Va at least CF at 5 feet post injection.  Retina, ONH, IOP normal after injection.    Followup as above.  Patient should return immediately PRN.  Retinal Detachment and Endophthalmitis precautions given.

## 2022-09-02 ENCOUNTER — OFFICE VISIT (OUTPATIENT)
Dept: OPTOMETRY | Facility: CLINIC | Age: 77
End: 2022-09-02
Payer: MEDICARE

## 2022-09-02 DIAGNOSIS — H52.7 REFRACTIVE ERROR: Primary | ICD-10-CM

## 2022-09-02 PROCEDURE — 92015 DETERMINE REFRACTIVE STATE: CPT | Mod: ,,, | Performed by: OPTOMETRIST

## 2022-09-02 PROCEDURE — 99211 OFF/OP EST MAY X REQ PHY/QHP: CPT | Mod: PBBFAC,PO | Performed by: OPTOMETRIST

## 2022-09-02 PROCEDURE — 99999 PR PBB SHADOW E&M-EST. PATIENT-LVL I: CPT | Mod: PBBFAC,,, | Performed by: OPTOMETRIST

## 2022-09-02 PROCEDURE — 99999 PR PBB SHADOW E&M-EST. PATIENT-LVL I: ICD-10-PCS | Mod: PBBFAC,,, | Performed by: OPTOMETRIST

## 2022-09-02 PROCEDURE — 92015 PR REFRACTION: ICD-10-PCS | Mod: ,,, | Performed by: OPTOMETRIST

## 2022-09-02 NOTE — PROGRESS NOTES
Subjective:       Patient ID: Francoise De Jesus is a 77 y.o. female      Chief Complaint   Patient presents with    Concerns About Ocular Health     History of Present Illness  Dls: 8/17/22 Dr. Potter     76 y/o female presents today for refraction.   Pt states no changes in vision.   Pt wears bifocal glasses.        Assessment/Plan:     1. Refractive error  Pt aware OD is balance lens. Educated patient on refractive error and discussed lens options. Dispensed updated spectacle Rx. Educated about adaptation period to new specs.      Eyeglass Final Rx       Eyeglass Final Rx         Sphere Cylinder Axis Add    Right Balance   +2.75    Left -0.25 +0.75 090 +2.75      Expiration Date: 9/2/2023                      Follow up for retina as scheduled.

## 2022-09-28 ENCOUNTER — PROCEDURE VISIT (OUTPATIENT)
Dept: OPHTHALMOLOGY | Facility: CLINIC | Age: 77
End: 2022-09-28
Attending: OPHTHALMOLOGY
Payer: MEDICARE

## 2022-09-28 DIAGNOSIS — H35.3231 EXUDATIVE AGE-RELATED MACULAR DEGENERATION OF BOTH EYES WITH ACTIVE CHOROIDAL NEOVASCULARIZATION: Primary | ICD-10-CM

## 2022-09-28 PROCEDURE — 67028 INJECTION EYE DRUG: CPT | Mod: 50,PBBFAC,PO | Performed by: OPHTHALMOLOGY

## 2022-09-28 PROCEDURE — 92134 CPTRZ OPH DX IMG PST SGM RTA: CPT | Mod: PBBFAC,PO | Performed by: OPHTHALMOLOGY

## 2022-09-28 PROCEDURE — 99499 UNLISTED E&M SERVICE: CPT | Mod: S$PBB,,, | Performed by: OPHTHALMOLOGY

## 2022-09-28 PROCEDURE — 67028 PR INJECT INTRAVITREAL PHARMCOLOGIC: ICD-10-PCS | Mod: 50,S$PBB,, | Performed by: OPHTHALMOLOGY

## 2022-09-28 PROCEDURE — 99499 NO LOS: ICD-10-PCS | Mod: S$PBB,,, | Performed by: OPHTHALMOLOGY

## 2022-09-28 PROCEDURE — 67028 INJECTION EYE DRUG: CPT | Mod: 50,S$PBB,, | Performed by: OPHTHALMOLOGY

## 2022-09-28 PROCEDURE — 92134 POSTERIOR SEGMENT OCT RETINA (OCULAR COHERENCE TOMOGRAPHY)-BOTH EYES: ICD-10-PCS | Mod: 26,S$PBB,, | Performed by: OPHTHALMOLOGY

## 2022-09-28 RX ADMIN — Medication 1.25 MG: at 02:09

## 2022-09-28 NOTE — PROGRESS NOTES
Subjective:       Patient ID: Francoise De Jesus is a 77 y.o. female      Chief Complaint   Patient presents with    Follow-up     History of Present Illness  HPI    6 wk OCT/KEVIN OU    Pt has no new va complaints.    Va stable OU.  Got lighted magnifier from .  Pt uses it to good effect    No flashes  No floaters  No pain  Gtts: AT's PRN OU  Last edited by Enrique Potter MD on 9/28/2022  2:18 PM.        Imaging:    See report    Assessment/Plan:     1. Exudative age-related macular degeneration of both eyes with active choroidal neovascularization  6 wks s/p Av OU.    Stabe OU  OD with stable SR scar    Proceed with Av OU today    Will inj OU today and TREX to 7 wks.      TREX as able     Had refraction  Went to  and got magnifier.  Happy    - Prior authorization Order  - Posterior Segment OCT Retina-Both eyes    Follow up in about 7 weeks (around 11/16/2022), or if symptoms worsen or fail to improve, for OCT and INJECTION ONLY, Injection Right eye, Injection Left eye, Avastin.     Patient identified.  Timeout performed.    Risks, benefits, and alternatives to treatment were discussed in detail with the patient, including bleeding/infection (endophthalmitis)/etc.  The patient voiced understanding and wished to proceed with the procedure.  See separate consent form.    Injection Procedure Note:  Diagnosis: Wet AMD Right Eye    Topical Proparacaine drop placed then topical 5% Betadine  Sterile gloves used, and sterile lid speculum placed.  5% Betadine placed at injection site again prior to injection.  Avastin 1.25mg in 0.05cc Injected inferotemporally 3.5-4mm posterior to the limbus.  Complications: None  Va at least CF at 5 feet post injection.  Retina, ONH, IOP normal after injection.    SEPARATE PREP    Injection Procedure Note:  Diagnosis: Wet AMD Left Eye    Topical Proparacaine drop placed then topical 5% Betadine  Sterile gloves used, and sterile lid speculum placed.  5% Betadine placed at injection site  again prior to injection.  Avastin 1.25mg in 0.05cc Injected inferotemporally 3.5-4mm posterior to the limbus.  Complications: None  Va at least CF at 5 feet post injection.  Retina, ONH, IOP normal after injection.    Followup as above.  Patient should return immediately PRN.  Retinal Detachment and Endophthalmitis precautions given.

## 2022-10-03 NOTE — PATIENT INSTRUCTIONS

## 2022-10-26 ENCOUNTER — PES CALL (OUTPATIENT)
Dept: ADMINISTRATIVE | Facility: CLINIC | Age: 77
End: 2022-10-26
Payer: MEDICARE

## 2022-11-16 ENCOUNTER — PROCEDURE VISIT (OUTPATIENT)
Dept: OPHTHALMOLOGY | Facility: CLINIC | Age: 77
End: 2022-11-16
Attending: OPHTHALMOLOGY
Payer: MEDICARE

## 2022-11-16 DIAGNOSIS — H35.3231 EXUDATIVE AGE-RELATED MACULAR DEGENERATION OF BOTH EYES WITH ACTIVE CHOROIDAL NEOVASCULARIZATION: Primary | ICD-10-CM

## 2022-11-16 PROCEDURE — 92134 CPTRZ OPH DX IMG PST SGM RTA: CPT | Mod: PBBFAC,PO | Performed by: OPHTHALMOLOGY

## 2022-11-16 PROCEDURE — 67028 INJECTION EYE DRUG: CPT | Mod: 50,S$PBB,, | Performed by: OPHTHALMOLOGY

## 2022-11-16 PROCEDURE — 99499 NO LOS: ICD-10-PCS | Mod: S$PBB,,, | Performed by: OPHTHALMOLOGY

## 2022-11-16 PROCEDURE — 99499 UNLISTED E&M SERVICE: CPT | Mod: S$PBB,,, | Performed by: OPHTHALMOLOGY

## 2022-11-16 PROCEDURE — 67028 INJECTION EYE DRUG: CPT | Mod: 50,PBBFAC,PO | Performed by: OPHTHALMOLOGY

## 2022-11-16 PROCEDURE — 92134 POSTERIOR SEGMENT OCT RETINA (OCULAR COHERENCE TOMOGRAPHY)-BOTH EYES: ICD-10-PCS | Mod: 26,S$PBB,, | Performed by: OPHTHALMOLOGY

## 2022-11-16 PROCEDURE — 67028 PR INJECT INTRAVITREAL PHARMCOLOGIC: ICD-10-PCS | Mod: 50,S$PBB,, | Performed by: OPHTHALMOLOGY

## 2022-11-16 RX ADMIN — Medication 1.25 MG: at 02:11

## 2022-11-16 NOTE — PROGRESS NOTES
Subjective:       Patient ID: Francoise De Jesus is a 77 y.o. female      Chief Complaint   Patient presents with    Injections     History of Present Illness  HPI    7 wks Avastin OU (INJECTION ONLY)    DLS: 09/28/2022 by Dr. ARIELLE Potter MD    CC: pt reports vision is much better today since last visit.      No blurred VA  No eye pain   No diplopia  No flashes //floaters   No headaches  No curtain/shadows/veils    Eye med: Restasis PRN OU    POHx:   1. Exudative ARMD  OD w/ Active Choroidal NVO  S/P Avastin OD (02/16/2022)  S/P Avastin OS ( 03/23/2022)  S/P Avastin OU (05/04/2022) (07/06/2022) ( 09/28/2022)  Last edited by Enrique Potter MD on 11/16/2022  2:44 PM.        Imaging:    See report    Assessment/Plan:     1. Exudative age-related macular degeneration of both eyes with active choroidal neovascularization  7 wks s/p Av OU.    Stabe OU  OD with stable SR scar    Proceed with Av OU today    Will inj OU today and TREX to 8 wks.      TREX as able     Had refraction  Went to [facility] and got magnifier.  Happy    - Prior authorization Order  - Posterior Segment OCT Retina-Both eyes    Follow up in about 8 weeks (around 1/11/2023), or if symptoms worsen or fail to improve, for OCT and INJECTION ONLY, Injection Right eye, Injection Left eye, Avastin.     Patient identified.  Timeout performed.    Risks, benefits, and alternatives to treatment were discussed in detail with the patient, including bleeding/infection (endophthalmitis)/etc.  The patient voiced understanding and wished to proceed with the procedure.  See separate consent form.    Injection Procedure Note:  Diagnosis: Wet AMD Right Eye    Topical Proparacaine drop placed then topical 5% Betadine  Sterile gloves used, and sterile lid speculum placed.  5% Betadine placed at injection site again prior to injection.  Avastin 1.25mg in 0.05cc Injected inferotemporally 3.5-4mm posterior to the limbus.  Complications: None  Va at least CF at 5 feet post injection.   Retina, ONH, IOP normal after injection.    SEPARATE PREP    Injection Procedure Note:  Diagnosis: Wet AMD Left Eye    Topical Proparacaine drop placed then topical 5% Betadine  Sterile gloves used, and sterile lid speculum placed.  5% Betadine placed at injection site again prior to injection.  Avastin 1.25mg in 0.05cc Injected inferotemporally 3.5-4mm posterior to the limbus.  Complications: None  Va at least CF at 5 feet post injection.  Retina, ONH, IOP normal after injection.    Followup as above.  Patient should return immediately PRN.  Retinal Detachment and Endophthalmitis precautions given.

## 2022-11-21 NOTE — PATIENT INSTRUCTIONS

## 2022-12-14 ENCOUNTER — TELEPHONE (OUTPATIENT)
Dept: ADMINISTRATIVE | Facility: CLINIC | Age: 77
End: 2022-12-14
Payer: MEDICARE

## 2022-12-14 NOTE — TELEPHONE ENCOUNTER
Called pt, informed pt I was calling to remind pt of her in office EAWV on 12/16/22; pt stated she needed to cancel the appt due to her son having work; pt declined to reschedule at this time and requested a call back in a few weeks

## 2023-01-11 ENCOUNTER — PROCEDURE VISIT (OUTPATIENT)
Dept: OPHTHALMOLOGY | Facility: CLINIC | Age: 78
End: 2023-01-11
Attending: OPHTHALMOLOGY
Payer: MEDICARE

## 2023-01-11 DIAGNOSIS — H35.3231 EXUDATIVE AGE-RELATED MACULAR DEGENERATION OF BOTH EYES WITH ACTIVE CHOROIDAL NEOVASCULARIZATION: Primary | ICD-10-CM

## 2023-01-11 PROCEDURE — 67028 PR INJECT INTRAVITREAL PHARMCOLOGIC: ICD-10-PCS | Mod: 50,S$PBB,, | Performed by: OPHTHALMOLOGY

## 2023-01-11 PROCEDURE — 92134 CPTRZ OPH DX IMG PST SGM RTA: CPT | Mod: PBBFAC,PO | Performed by: OPHTHALMOLOGY

## 2023-01-11 PROCEDURE — 99499 NO LOS: ICD-10-PCS | Mod: S$PBB,,, | Performed by: OPHTHALMOLOGY

## 2023-01-11 PROCEDURE — 99499 UNLISTED E&M SERVICE: CPT | Mod: S$PBB,,, | Performed by: OPHTHALMOLOGY

## 2023-01-11 PROCEDURE — 67028 INJECTION EYE DRUG: CPT | Mod: 50,PBBFAC,PO | Performed by: OPHTHALMOLOGY

## 2023-01-11 PROCEDURE — 92134 POSTERIOR SEGMENT OCT RETINA (OCULAR COHERENCE TOMOGRAPHY)-BOTH EYES: ICD-10-PCS | Mod: 26,S$PBB,, | Performed by: OPHTHALMOLOGY

## 2023-01-11 PROCEDURE — 67028 INJECTION EYE DRUG: CPT | Mod: 50,S$PBB,, | Performed by: OPHTHALMOLOGY

## 2023-01-11 RX ADMIN — Medication 1.25 MG: at 02:01

## 2023-01-11 NOTE — PROGRESS NOTES
Subjective:       Patient ID: Francoise De Jesus is a 77 y.o. female      Chief Complaint   Patient presents with    Procedure     Avastin ou     History of Present Illness  HPI     Procedure     Additional comments: Avastin ou           Comments    DLS: 11/16/22    Pt here for 8 week follow up and Avastin OU today  Pt states no changes since last exam and feels vision is about the same.    No pain OU    1. Wet ARMD OU with active CNV  -S/p Avastin OD (11/16/22)  -S/p Avastin OS (11/16/22)          Last edited by Enrique Potter MD on 1/11/2023  2:05 PM.        Imaging:    See report    Assessment/Plan:     1. 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    Stabe OU  OD with stable SR scar    Proceed with Av OU today    Will inj OU today and TREX to 9 wks.      TREX as able     Had refraction  Went to  and got magnifier.  Happy    - Prior authorization Order  - Posterior Segment OCT Retina-Both eyes    Follow up in about 9 weeks (around 3/15/2023), or if symptoms worsen or fail to improve, for Comprehensive Examination, OCT Mac, Injection Right eye, Injection Left eye, Avastin.     Patient identified.  Timeout performed.    Risks, benefits, and alternatives to treatment were discussed in detail with the patient, including bleeding/infection (endophthalmitis)/etc.  The patient voiced understanding and wished to proceed with the procedure.  See separate consent form.    Injection Procedure Note:  Diagnosis: Wet AMD Right Eye    Topical Proparacaine drop placed then topical 5% Betadine  Sterile gloves used, and sterile lid speculum placed.  5% Betadine placed at injection site again prior to injection.  Avastin 1.25mg in 0.05cc Injected inferotemporally 3.5-4mm posterior to the limbus.  Complications: None  Va at least CF at 5 feet post injection.  Retina, ONH, IOP normal after injection.    SEPARATE PREP    Injection Procedure Note:  Diagnosis: Wet  AMD Left Eye    Topical Proparacaine drop placed then topical 5% Betadine  Sterile gloves used, and sterile lid speculum placed.  5% Betadine placed at injection site again prior to injection.  Avastin 1.25mg in 0.05cc Injected inferotemporally 3.5-4mm posterior to the limbus.  Complications: None  Va at least CF at 5 feet post injection.  Retina, ONH, IOP normal after injection.    Followup as above.  Patient should return immediately PRN.  Retinal Detachment and Endophthalmitis precautions given.

## 2023-01-13 ENCOUNTER — PES CALL (OUTPATIENT)
Dept: ADMINISTRATIVE | Facility: CLINIC | Age: 78
End: 2023-01-13
Payer: MEDICARE

## 2023-01-13 NOTE — PATIENT INSTRUCTIONS

## 2023-01-24 DIAGNOSIS — E78.49 OTHER HYPERLIPIDEMIA: ICD-10-CM

## 2023-01-24 DIAGNOSIS — I12.9 BENIGN HYPERTENSION WITH CHRONIC KIDNEY DISEASE, STAGE III: ICD-10-CM

## 2023-01-24 DIAGNOSIS — K21.9 GASTROESOPHAGEAL REFLUX DISEASE WITHOUT ESOPHAGITIS: ICD-10-CM

## 2023-01-24 DIAGNOSIS — N18.30 BENIGN HYPERTENSION WITH CHRONIC KIDNEY DISEASE, STAGE III: ICD-10-CM

## 2023-01-24 NOTE — TELEPHONE ENCOUNTER
----- Message from Leticia Carin sent at 1/24/2023  3:49 PM CST -----  Regarding: self [phone]  .Type: RX Refill Request    Who Called: self    Have you contacted your pharmacy: no  Refill or New Rx: refill    RX Name and Strength: lisinopriL (PRINIVIL,ZESTRIL) 40 MG tablet, omeprazole (PRILOSEC) 40 MG capsule, pravastatin (PRAVACHOL) 10 MG tablet, fenofibrate (TRICOR) 145 MG tablet, hydroCHLOROthiazide (HYDRODIURIL) 25 MG tablet      Is this a 30 day or 90 day RX: 90 day     Preferred Pharmacy with phone number: .  [facility] - [id number] [facility]  [address] [place] [address]  Phone: [phone] Fax: [phone]    Local or Mail Order: mail     Would the patient rather a call back or a response via [facility]?  Call back     Best Call Back Number: [phone]

## 2023-01-24 NOTE — TELEPHONE ENCOUNTER
Care Due:                  Date            Visit Type   Department     Provider  --------------------------------------------------------------------------------                                [facility]                              PRIMARY      MED/ INTERNAL  Laurentia Kailyn  Last Visit: 02-      CARE ([facility])   MED/ PEDS      Josekelgermán Auguste                              [facility]/ INTERNAL  [facility]  Next Visit: 03-      CARE ([facility])   MED/ PEDS      Josekeler  Molina                                                            Last  Test          Frequency    Reason                     Performed    Due Date  --------------------------------------------------------------------------------    CBC.........  12 months..  fenofibrate..............  02-   02-    CMP.........  12 months..  fenofibrate,               02-   02-                             hydroCHLOROthiazide,                             lisinopriL, pravastatin..    Lipid Panel.  12 months..  fenofibrate, pravastatin.  02-   02-    [facility] Embedded Care Gaps. Reference number: [id number]. 1/24/2023   3:56:54 PM CST

## 2023-01-25 RX ORDER — OMEPRAZOLE 40 MG/1
40 CAPSULE, DELAYED RELEASE ORAL DAILY
Qty: 90 CAPSULE | Refills: 0 | Status: SHIPPED | OUTPATIENT
Start: 2023-01-25 | End: 2023-03-17 | Stop reason: SDUPTHER

## 2023-01-25 RX ORDER — LISINOPRIL 40 MG/1
40 TABLET ORAL DAILY
Qty: 90 TABLET | Refills: 0 | Status: SHIPPED | OUTPATIENT
Start: 2023-01-25 | End: 2023-03-17 | Stop reason: SDUPTHER

## 2023-01-25 RX ORDER — PRAVASTATIN SODIUM 10 MG/1
10 TABLET ORAL DAILY
Qty: 90 TABLET | Refills: 0 | Status: SHIPPED | OUTPATIENT
Start: 2023-01-25 | End: 2023-03-17 | Stop reason: SDUPTHER

## 2023-01-25 RX ORDER — FENOFIBRATE 145 MG/1
145 TABLET, FILM COATED ORAL DAILY
Qty: 90 TABLET | Refills: 0 | Status: SHIPPED | OUTPATIENT
Start: 2023-01-25 | End: 2023-03-17 | Stop reason: SDUPTHER

## 2023-01-25 RX ORDER — HYDROCHLOROTHIAZIDE 25 MG/1
25 TABLET ORAL DAILY
Qty: 90 TABLET | Refills: 0 | Status: SHIPPED | OUTPATIENT
Start: 2023-01-25 | End: 2023-03-17 | Stop reason: SDUPTHER

## 2023-01-31 ENCOUNTER — TELEPHONE (OUTPATIENT)
Dept: ADMINISTRATIVE | Facility: CLINIC | Age: 78
End: 2023-01-31
Payer: MEDICARE

## 2023-03-09 ENCOUNTER — TELEPHONE (OUTPATIENT)
Dept: FAMILY MEDICINE | Facility: CLINIC | Age: 78
End: 2023-03-09
Payer: MEDICARE

## 2023-03-09 DIAGNOSIS — R73.03 PREDIABETES: ICD-10-CM

## 2023-03-09 DIAGNOSIS — N18.30 BENIGN HYPERTENSION WITH CHRONIC KIDNEY DISEASE, STAGE III: ICD-10-CM

## 2023-03-09 DIAGNOSIS — E78.49 OTHER HYPERLIPIDEMIA: Primary | ICD-10-CM

## 2023-03-09 DIAGNOSIS — I12.9 BENIGN HYPERTENSION WITH CHRONIC KIDNEY DISEASE, STAGE III: ICD-10-CM

## 2023-03-13 ENCOUNTER — LAB VISIT (OUTPATIENT)
Dept: LAB | Facility: HOSPITAL | Age: 78
End: 2023-03-13
Attending: INTERNAL MEDICINE
Payer: MEDICARE

## 2023-03-13 DIAGNOSIS — E78.49 OTHER HYPERLIPIDEMIA: ICD-10-CM

## 2023-03-13 DIAGNOSIS — N18.30 BENIGN HYPERTENSION WITH CHRONIC KIDNEY DISEASE, STAGE III: ICD-10-CM

## 2023-03-13 DIAGNOSIS — I12.9 BENIGN HYPERTENSION WITH CHRONIC KIDNEY DISEASE, STAGE III: ICD-10-CM

## 2023-03-13 DIAGNOSIS — R73.03 PREDIABETES: ICD-10-CM

## 2023-03-13 LAB
ALBUMIN SERPL BCP-MCNC: 4 G/DL (ref 3.5–5.2)
ALP SERPL-CCNC: 57 U/L (ref 55–135)
ALT SERPL W/O P-5'-P-CCNC: 12 U/L (ref 10–44)
ANION GAP SERPL CALC-SCNC: 11 MMOL/L (ref 8–16)
AST SERPL-CCNC: 24 U/L (ref 10–40)
BASOPHILS # BLD AUTO: 0.06 K/UL (ref 0–0.2)
BASOPHILS NFR BLD: 0.7 % (ref 0–1.9)
BILIRUB SERPL-MCNC: 0.4 MG/DL (ref 0.1–1)
BUN SERPL-MCNC: 35 MG/DL (ref 8–23)
CALCIUM SERPL-MCNC: 10.4 MG/DL (ref 8.7–10.5)
CHLORIDE SERPL-SCNC: 106 MMOL/L (ref 95–110)
CHOLEST SERPL-MCNC: 168 MG/DL (ref 120–199)
CHOLEST/HDLC SERPL: 2.8 {RATIO} (ref 2–5)
CO2 SERPL-SCNC: 22 MMOL/L (ref 23–29)
CREAT SERPL-MCNC: 1 MG/DL (ref 0.5–1.4)
DIFFERENTIAL METHOD: ABNORMAL
EOSINOPHIL # BLD AUTO: 0.2 K/UL (ref 0–0.5)
EOSINOPHIL NFR BLD: 2.3 % (ref 0–8)
ERYTHROCYTE [DISTWIDTH] IN BLOOD BY AUTOMATED COUNT: 12.8 % (ref 11.5–14.5)
EST. GFR  (NO RACE VARIABLE): 57.7 ML/MIN/1.73 M^2
ESTIMATED AVG GLUCOSE: 120 MG/DL (ref 68–131)
GLUCOSE SERPL-MCNC: 102 MG/DL (ref 70–110)
HBA1C MFR BLD: 5.8 % (ref 4–5.6)
HCT VFR BLD AUTO: 38.6 % (ref 37–48.5)
HDLC SERPL-MCNC: 61 MG/DL (ref 40–75)
HDLC SERPL: 36.3 % (ref 20–50)
HGB BLD-MCNC: 12.1 G/DL (ref 12–16)
IMM GRANULOCYTES # BLD AUTO: 0.03 K/UL (ref 0–0.04)
IMM GRANULOCYTES NFR BLD AUTO: 0.4 % (ref 0–0.5)
LDLC SERPL CALC-MCNC: 87.2 MG/DL (ref 63–159)
LYMPHOCYTES # BLD AUTO: 2.6 K/UL (ref 1–4.8)
LYMPHOCYTES NFR BLD: 32 % (ref 18–48)
MCH RBC QN AUTO: 30.4 PG (ref 27–31)
MCHC RBC AUTO-ENTMCNC: 31.3 G/DL (ref 32–36)
MCV RBC AUTO: 97 FL (ref 82–98)
MONOCYTES # BLD AUTO: 0.6 K/UL (ref 0.3–1)
MONOCYTES NFR BLD: 7.5 % (ref 4–15)
NEUTROPHILS # BLD AUTO: 4.6 K/UL (ref 1.8–7.7)
NEUTROPHILS NFR BLD: 57.1 % (ref 38–73)
NONHDLC SERPL-MCNC: 107 MG/DL
NRBC BLD-RTO: 0 /100 WBC
PLATELET # BLD AUTO: 245 K/UL (ref 150–450)
PMV BLD AUTO: 11.7 FL (ref 9.2–12.9)
POTASSIUM SERPL-SCNC: 4.3 MMOL/L (ref 3.5–5.1)
PROT SERPL-MCNC: 7.8 G/DL (ref 6–8.4)
RBC # BLD AUTO: 3.98 M/UL (ref 4–5.4)
SODIUM SERPL-SCNC: 139 MMOL/L (ref 136–145)
TRIGL SERPL-MCNC: 99 MG/DL (ref 30–150)
WBC # BLD AUTO: 8.09 K/UL (ref 3.9–12.7)

## 2023-03-13 PROCEDURE — 80053 COMPREHEN METABOLIC PANEL: CPT | Performed by: INTERNAL MEDICINE

## 2023-03-13 PROCEDURE — 36415 COLL VENOUS BLD VENIPUNCTURE: CPT | Mod: PO | Performed by: INTERNAL MEDICINE

## 2023-03-13 PROCEDURE — 83036 HEMOGLOBIN GLYCOSYLATED A1C: CPT | Performed by: INTERNAL MEDICINE

## 2023-03-13 PROCEDURE — 80061 LIPID PANEL: CPT | Performed by: INTERNAL MEDICINE

## 2023-03-13 PROCEDURE — 85025 COMPLETE CBC W/AUTO DIFF WBC: CPT | Performed by: INTERNAL MEDICINE

## 2023-03-15 ENCOUNTER — PROCEDURE VISIT (OUTPATIENT)
Dept: OPHTHALMOLOGY | Facility: CLINIC | Age: 78
End: 2023-03-15
Attending: OPHTHALMOLOGY
Payer: MEDICARE

## 2023-03-15 DIAGNOSIS — H35.3231 EXUDATIVE AGE-RELATED MACULAR DEGENERATION OF BOTH EYES WITH ACTIVE CHOROIDAL NEOVASCULARIZATION: Primary | ICD-10-CM

## 2023-03-15 PROCEDURE — 99214 PR OFFICE/OUTPT VISIT, EST, LEVL IV, 30-39 MIN: ICD-10-PCS | Mod: 25,S$PBB,, | Performed by: OPHTHALMOLOGY

## 2023-03-15 PROCEDURE — 67028 INJECTION EYE DRUG: CPT | Mod: 50,PBBFAC,PO | Performed by: OPHTHALMOLOGY

## 2023-03-15 PROCEDURE — 99214 OFFICE O/P EST MOD 30 MIN: CPT | Mod: 25,S$PBB,, | Performed by: OPHTHALMOLOGY

## 2023-03-15 PROCEDURE — 67028 INJECTION EYE DRUG: CPT | Mod: 50,S$PBB,, | Performed by: OPHTHALMOLOGY

## 2023-03-15 PROCEDURE — 67028 PR INJECT INTRAVITREAL PHARMCOLOGIC: ICD-10-PCS | Mod: 50,S$PBB,, | Performed by: OPHTHALMOLOGY

## 2023-03-15 RX ADMIN — Medication 1.25 MG: at 02:03

## 2023-03-15 NOTE — PROGRESS NOTES
Subjective:       Patient ID: Francoise De Jesus is a 77 y.o. female      Chief Complaint   Patient presents with    Follow-up     F/u for yearly comprehensive examination as instructed     History of Present Illness  HPI     Follow-up     Additional comments: F/u for yearly comprehensive examination as   instructed           Comments    9 wks  Comp Ex/ OCTm and Avastin OU      DLS: 01/11/2023 by Dr. ARIELLE Potter MD    CC: pt reports vision is stable and good OU.  Happy with current vision     No blurred VA  No eye pain   No diplopia  No flashes //floaters   No headaches  No curtain/shadows/veils    Eye med:   ATs PRN OU  Preservision     POHx:   1. Exudative ARMD  OD w/ Active Choroidal NVO  S/P Avastin OD (02/16/2022)  S/P Avastin OS ( 03/23/2022)  S/P Avastin OU (05/04/2022) (07/06/2022) ( 09/28/2022) (01/11/2023)          Last edited by Enrique Potter MD on 3/15/2023  2:02 PM.        Imaging:    See report    Assessment/Plan:     1. Exudative age-related macular degeneration of both eyes with active choroidal neovascularization  Controlled OU today 9 wks s/p Av OU    No adverse sequelae from treatment    IOP good, ONH appears stable OU    Rec Av OU today and TREX to 10 wks    RBA to inj discussed again    - Prior authorization Order  - Posterior Segment OCT Retina-Both eyes    Follow up in about 10 weeks (around 5/24/2023), or if symptoms worsen or fail to improve, for OCT and INJECTION ONLY, Injection Right eye, Injection Left eye, Avastin.     Patient identified.  Timeout performed.    Risks, benefits, and alternatives to treatment were discussed in detail with the patient, including bleeding/infection (endophthalmitis)/etc.  The patient voiced understanding and wished to proceed with the procedure.  See separate consent form.    Injection Procedure Note:  Diagnosis: Wet AMD Right Eye    Topical Proparacaine drop placed then topical 5% Betadine  Sterile gloves used, and sterile lid speculum placed.  5%  Betadine placed at injection site again prior to injection.  Avastin 1.25mg in 0.05cc Injected inferotemporally 3.5-4mm posterior to the limbus.  Complications: None  Va at least CF at 5 feet post injection.  Retina, ONH, IOP normal after injection.    Followup as above.  Patient should return immediately PRN.  Retinal Detachment and Endophthalmitis precautions given.    Patient identified.  Timeout performed.    Risks, benefits, and alternatives to treatment were discussed in detail with the patient, including bleeding/infection (endophthalmitis)/etc.  The patient voiced understanding and wished to proceed with the procedure.  See separate consent form.    Injection Procedure Note:  Diagnosis: Wet AMD Left Eye    Topical Proparacaine drop placed then topical 5% Betadine  Sterile gloves used, and sterile lid speculum placed.  5% Betadine placed at injection site again prior to injection.  Avastin 1.25mg in 0.05cc Injected inferotemporally 3.5-4mm posterior to the limbus.  Complications: None  Va at least CF at 5 feet post injection.  Retina, ONH, IOP normal after injection.    Followup as above.  Patient should return immediately PRN.  Retinal Detachment and Endophthalmitis precautions given.

## 2023-03-16 NOTE — PATIENT INSTRUCTIONS

## 2023-03-17 ENCOUNTER — OFFICE VISIT (OUTPATIENT)
Dept: FAMILY MEDICINE | Facility: CLINIC | Age: 78
End: 2023-03-17
Payer: MEDICARE

## 2023-03-17 VITALS
HEART RATE: 69 BPM | DIASTOLIC BLOOD PRESSURE: 70 MMHG | SYSTOLIC BLOOD PRESSURE: 122 MMHG | BODY MASS INDEX: 37.64 KG/M2 | HEIGHT: 62 IN | OXYGEN SATURATION: 97 % | WEIGHT: 204.56 LBS | TEMPERATURE: 98 F

## 2023-03-17 DIAGNOSIS — N18.30 BENIGN HYPERTENSION WITH CHRONIC KIDNEY DISEASE, STAGE III: Primary | ICD-10-CM

## 2023-03-17 DIAGNOSIS — Z23 NEED FOR TDAP VACCINATION: ICD-10-CM

## 2023-03-17 DIAGNOSIS — Z12.31 SCREENING MAMMOGRAM, ENCOUNTER FOR: ICD-10-CM

## 2023-03-17 DIAGNOSIS — K21.9 GASTROESOPHAGEAL REFLUX DISEASE WITHOUT ESOPHAGITIS: ICD-10-CM

## 2023-03-17 DIAGNOSIS — I70.0 ATHEROSCLEROSIS OF AORTA: ICD-10-CM

## 2023-03-17 DIAGNOSIS — E66.01 SEVERE OBESITY (BMI 35.0-39.9) WITH COMORBIDITY: ICD-10-CM

## 2023-03-17 DIAGNOSIS — I12.9 BENIGN HYPERTENSION WITH CHRONIC KIDNEY DISEASE, STAGE III: Primary | ICD-10-CM

## 2023-03-17 DIAGNOSIS — Z23 NEED FOR SHINGLES VACCINE: ICD-10-CM

## 2023-03-17 DIAGNOSIS — E78.49 OTHER HYPERLIPIDEMIA: ICD-10-CM

## 2023-03-17 DIAGNOSIS — R73.03 PREDIABETES: ICD-10-CM

## 2023-03-17 DIAGNOSIS — H35.3132 INTERMEDIATE STAGE NONEXUDATIVE AGE-RELATED MACULAR DEGENERATION OF BOTH EYES: ICD-10-CM

## 2023-03-17 PROCEDURE — 99999 PR PBB SHADOW E&M-EST. PATIENT-LVL IV: CPT | Mod: PBBFAC,,, | Performed by: INTERNAL MEDICINE

## 2023-03-17 PROCEDURE — 99214 OFFICE O/P EST MOD 30 MIN: CPT | Mod: S$PBB,,, | Performed by: INTERNAL MEDICINE

## 2023-03-17 PROCEDURE — 99999 PR PBB SHADOW E&M-EST. PATIENT-LVL IV: ICD-10-PCS | Mod: PBBFAC,,, | Performed by: INTERNAL MEDICINE

## 2023-03-17 PROCEDURE — 99214 PR OFFICE/OUTPT VISIT, EST, LEVL IV, 30-39 MIN: ICD-10-PCS | Mod: S$PBB,,, | Performed by: INTERNAL MEDICINE

## 2023-03-17 PROCEDURE — 99214 OFFICE O/P EST MOD 30 MIN: CPT | Mod: PBBFAC,PO | Performed by: INTERNAL MEDICINE

## 2023-03-17 RX ORDER — PRAVASTATIN SODIUM 10 MG/1
10 TABLET ORAL DAILY
Qty: 90 TABLET | Refills: 2 | Status: SHIPPED | OUTPATIENT
Start: 2023-03-17 | End: 2024-02-26 | Stop reason: SDUPTHER

## 2023-03-17 RX ORDER — HYDROCHLOROTHIAZIDE 25 MG/1
25 TABLET ORAL DAILY
Qty: 90 TABLET | Refills: 2 | Status: SHIPPED | OUTPATIENT
Start: 2023-03-17 | End: 2024-02-26 | Stop reason: SDUPTHER

## 2023-03-17 RX ORDER — LISINOPRIL 40 MG/1
40 TABLET ORAL DAILY
Qty: 90 TABLET | Refills: 2 | Status: SHIPPED | OUTPATIENT
Start: 2023-03-17 | End: 2024-02-26 | Stop reason: SDUPTHER

## 2023-03-17 RX ORDER — FENOFIBRATE 145 MG/1
145 TABLET, FILM COATED ORAL DAILY
Qty: 90 TABLET | Refills: 2 | Status: SHIPPED | OUTPATIENT
Start: 2023-03-17 | End: 2024-02-26 | Stop reason: SDUPTHER

## 2023-03-17 RX ORDER — OMEPRAZOLE 40 MG/1
40 CAPSULE, DELAYED RELEASE ORAL DAILY PRN
Qty: 90 CAPSULE | Refills: 0 | Status: SHIPPED | OUTPATIENT
Start: 2023-03-17 | End: 2023-08-23

## 2023-03-17 NOTE — PROGRESS NOTES
274}  HISTORY OF PRESENT ILLNESS:  Francoise De Jesus is a 77 y.o. female who presents to the clinic today for Annual Exam  .     The patient presents to clinic today for follow-up of her hypertension complicated by chronic kidney disease stage 3, hyperlipidemia, and prediabetes.  She states that she has been working on weight loss.  She just in general has less appetite than she used to and has decreased portion sizes.  She denies any problems with cardiac chest pain or shortness of breath.  She recently did blood work and is here today to discuss the results.  She also reports some decrease in vision due to her macular degeneration.  She is considering seeing Podiatry to help with care of her toenails.      PAST MEDICAL HISTORY:  Past Medical History:   Diagnosis Date    Arthritis     Atherosclerosis of aorta     noted on CXR 11/17/2014    Breast cancer 2011    stage I right breast cancer    Disorder of kidney and ureter     History of peptic ulcer     Hyperlipidemia     Hypertension     Macular degeneration 3/13/2017    Morbid obesity     Prediabetes        PAST SURGICAL HISTORY:  Past Surgical History:   Procedure Laterality Date    BREAST BIOPSY Left     BREAST LUMPECTOMY Right 11/2011    lumpectomy and SN biopsy with radiation    CATARACT EXTRACTION W/  INTRAOCULAR LENS IMPLANT Left 07/22/2017    Dr. Okeefe    CATARACT EXTRACTION W/  INTRAOCULAR LENS IMPLANT Right 07/06/2017    Dr. Okeefe    CHOLECYSTECTOMY      EYE SURGERY      Lt cataract       SOCIAL HISTORY:  Social History     Socioeconomic History    Marital status:     Number of children: 2   Occupational History    Occupation:    Tobacco Use    Smoking status: Never    Smokeless tobacco: Never   Substance and Sexual Activity    Alcohol use: No    Drug use: No    Sexual activity: Not Currently     Partners: Male       FAMILY HISTORY:  Family History   Problem Relation Age of Onset    Stomach cancer Mother     Lung cancer Father      Hypertension Father     Breast cancer Sister 58    Cataracts Sister     Macular degeneration Sister     Cancer Sister         breast CA    Macular degeneration Brother         wet    Hypertension Brother     Cataracts Sister     Macular degeneration Sister     Breast cancer Sister 72    Cancer Sister         breast CA     Diabetes Sister     Hypertension Sister     Macular degeneration Sister     Macular degeneration Sister     Macular degeneration Brother         dry    Hypertension Brother     Hypertension Brother     Diabetes Brother     Hypertension Brother     Prostate cancer Brother     Cancer Brother 80        prostate     Hypertension Brother     Hyperlipidemia Brother     Leukemia Brother     No Known Problems Son     No Known Problems Son     Ovarian cancer Neg Hx     Amblyopia Neg Hx     Blindness Neg Hx     Glaucoma Neg Hx     Retinal detachment Neg Hx     Strabismus Neg Hx     Stroke Neg Hx     Thyroid disease Neg Hx        ALLERGIES AND MEDICATIONS: updated and reviewed.  Review of patient's allergies indicates:   Allergen Reactions    Medrol [methylprednisolone]      Depression and tearful    Penicillins Rash     Medication List with Changes/Refills   Current Medications    ACETAMINOPHEN (TYLENOL) 500 MG TABLET    Take 500 mg by mouth every 6 (six) hours as needed for Pain.    ANTIOX#10/OM3/DHA/EPA/LUT/ZEAX (I-CAPS ORAL)    Take 1 tablet by mouth 2 (two) times a day.    ASPIRIN (ECOTRIN) 81 MG EC TABLET    Take 1 tablet (81 mg total) by mouth once daily.   Changed and/or Refilled Medications    Modified Medication Previous Medication    FENOFIBRATE (TRICOR) 145 MG TABLET fenofibrate (TRICOR) 145 MG tablet       Take 1 tablet (145 mg total) by mouth once daily. 1 Tablet Oral Every evening    Take 1 tablet (145 mg total) by mouth once daily. 1 Tablet Oral Every evening    HYDROCHLOROTHIAZIDE (HYDRODIURIL) 25 MG TABLET hydroCHLOROthiazide (HYDRODIURIL) 25 MG tablet       Take 1 tablet (25 mg total) by  "mouth once daily. 1 Tablet Oral Every morning    Take 1 tablet (25 mg total) by mouth once daily. 1 Tablet Oral Every morning    LISINOPRIL (PRINIVIL,ZESTRIL) 40 MG TABLET lisinopriL (PRINIVIL,ZESTRIL) 40 MG tablet       Take 1 tablet (40 mg total) by mouth once daily. 1 Tablet Oral Every morning    Take 1 tablet (40 mg total) by mouth once daily. 1 Tablet Oral Every morning    OMEPRAZOLE (PRILOSEC) 40 MG CAPSULE omeprazole (PRILOSEC) 40 MG capsule       Take 1 capsule (40 mg total) by mouth daily as needed (heartburn). Take only as needed.    Take 1 capsule (40 mg total) by mouth once daily.    PRAVASTATIN (PRAVACHOL) 10 MG TABLET pravastatin (PRAVACHOL) 10 MG tablet       Take 1 tablet (10 mg total) by mouth once daily.    Take 1 tablet (10 mg total) by mouth once daily.         CARE TEAM:  Patient Care Team:  Lulu Auguste MD as PCP - General (Internal Medicine)  Kyler Hickman MD as Consulting Physician (Hematology and Oncology)  Wang Agarwal OD as Consulting Physician (Optometry)  Kyler Hickman MD as Consulting Physician (Hematology and Oncology)         REVIEW OF SYSTEMS:  Review of Systems   Constitutional:  Negative for chills and fever.   HENT:  Negative for congestion.    Eyes:  Positive for visual disturbance (chronic; stable; due to MD).   Respiratory:  Negative for cough and shortness of breath.    Cardiovascular:  Negative for chest pain and leg swelling.   Gastrointestinal:  Negative for abdominal pain.   Genitourinary:  Negative for dysuria and hematuria.       PHYSICAL EXAM: 274}  Vitals:    03/17/23 1441   BP: 122/70   Pulse: 69   Temp: 98 °F (36.7 °C)   TempSrc: Oral   SpO2: 97%   Weight: 92.8 kg (204 lb 9.4 oz)   Height: 5' 2" (1.575 m)       Body mass index is 37.42 kg/m².      General appearance - alert, well appearing, and in no distress, obese  Psychiatric - alert, oriented to person, place, and time, normal behavior, speech, dress, motor activity and thought process  Eyes - " pupils equal and reactive, extraocular eye movements intact, sclera anicteric  Mouth - not examined  Neck - supple, no significant adenopathy, carotids upstroke normal bilaterally, no bruits  Lymphatics - no palpable cervical lymphadenopathy  Chest - clear to auscultation, no wheezes, rales or rhonchi, symmetric air entry  Heart - normal rate and regular rhythm, no gallops noted  Neurological - alert, normal speech, no focal findings; cranial nerves II through XII intact  Musculoskeletal - patient noted to have Moderate osteoarthritic changes to both knee joints. No joint effusions noted.  Extremities - no pedal edema noted  Skin - normal coloration, no suspicious skin lesions      Labs:  Results for orders placed or performed in visit on 03/13/23   CBC Auto Differential   Result Value Ref Range    WBC 8.09 3.90 - 12.70 K/uL    RBC 3.98 (L) 4.00 - 5.40 M/uL    Hemoglobin 12.1 12.0 - 16.0 g/dL    Hematocrit 38.6 37.0 - 48.5 %    MCV 97 82 - 98 fL    MCH 30.4 27.0 - 31.0 pg    MCHC 31.3 (L) 32.0 - 36.0 g/dL    RDW 12.8 11.5 - 14.5 %    Platelets 245 150 - 450 K/uL    MPV 11.7 9.2 - 12.9 fL    Immature Granulocytes 0.4 0.0 - 0.5 %    Gran # (ANC) 4.6 1.8 - 7.7 K/uL    Immature Grans (Abs) 0.03 0.00 - 0.04 K/uL    Lymph # 2.6 1.0 - 4.8 K/uL    Mono # 0.6 0.3 - 1.0 K/uL    Eos # 0.2 0.0 - 0.5 K/uL    Baso # 0.06 0.00 - 0.20 K/uL    nRBC 0 0 /100 WBC    Gran % 57.1 38.0 - 73.0 %    Lymph % 32.0 18.0 - 48.0 %    Mono % 7.5 4.0 - 15.0 %    Eosinophil % 2.3 0.0 - 8.0 %    Basophil % 0.7 0.0 - 1.9 %    Differential Method Automated    Comprehensive Metabolic Panel   Result Value Ref Range    Sodium 139 136 - 145 mmol/L    Potassium 4.3 3.5 - 5.1 mmol/L    Chloride 106 95 - 110 mmol/L    CO2 22 (L) 23 - 29 mmol/L    Glucose 102 70 - 110 mg/dL    BUN 35 (H) 8 - 23 mg/dL    Creatinine 1.0 0.5 - 1.4 mg/dL    Calcium 10.4 8.7 - 10.5 mg/dL    Total Protein 7.8 6.0 - 8.4 g/dL    Albumin 4.0 3.5 - 5.2 g/dL    Total Bilirubin 0.4 0.1  - 1.0 mg/dL    Alkaline Phosphatase 57 55 - 135 U/L    AST 24 10 - 40 U/L    ALT 12 10 - 44 U/L    Anion Gap 11 8 - 16 mmol/L    eGFR 57.7 (A) >60 mL/min/1.73 m^2   Lipid Panel   Result Value Ref Range    Cholesterol 168 120 - 199 mg/dL    Triglycerides 99 30 - 150 mg/dL    HDL 61 40 - 75 mg/dL    LDL Cholesterol 87.2 63.0 - 159.0 mg/dL    HDL/Cholesterol Ratio 36.3 20.0 - 50.0 %    Total Cholesterol/HDL Ratio 2.8 2.0 - 5.0    Non-HDL Cholesterol 107 mg/dL   Hemoglobin A1C   Result Value Ref Range    Hemoglobin A1C 5.8 (H) 4.0 - 5.6 %    Estimated Avg Glucose 120 68 - 131 mg/dL          ASSESSMENT AND PLAN:  274}  1. Benign hypertension with chronic kidney disease, stage III  BP Readings from Last 1 Encounters:   03/17/23 122/70      Discussed sodium restriction, maintaining ideal body weight and regular exercise program as physiologic means to achieve blood pressure control. The patient will strive towards this.   The current medical regimen is effective;  continue present plan and medications. Recommended patient to check home readings to monitor and see me for followup as scheduled or sooner as needed.   Patient was educated that both decongestant and anti-inflammatory medication may raise blood pressure.  Stable decreased kidney function. Observe. Patient counseled to avoid/minimize the use of anti-inflammatory  Medication. Discussed to stay well hydrated. Also discussed with patient that good control of blood pressure and/or diabetes, if present, will help to prevent progression.  The patient is not active on the digital hypertension program.  -     hydroCHLOROthiazide (HYDRODIURIL) 25 MG tablet; Take 1 tablet (25 mg total) by mouth once daily. 1 Tablet Oral Every morning  Dispense: 90 tablet; Refill: 2  -     lisinopriL (PRINIVIL,ZESTRIL) 40 MG tablet; Take 1 tablet (40 mg total) by mouth once daily. 1 Tablet Oral Every morning  Dispense: 90 tablet; Refill: 2    2. Other hyperlipidemia  Lab Results   Component  Value Date    CHOL 168 03/13/2023     Lab Results   Component Value Date    HDL 61 03/13/2023     Lab Results   Component Value Date    LDLCALC 87.2 03/13/2023     Lab Results   Component Value Date    TRIG 99 03/13/2023     Lab Results   Component Value Date    LDLCALC 87.2 03/13/2023     We discussed low fat diet and regular exercise.The current medical regimen is effective;  continue present plan and medications.   -     fenofibrate (TRICOR) 145 MG tablet; Take 1 tablet (145 mg total) by mouth once daily. 1 Tablet Oral Every evening  Dispense: 90 tablet; Refill: 2  -     pravastatin (PRAVACHOL) 10 MG tablet; Take 1 tablet (10 mg total) by mouth once daily.  Dispense: 90 tablet; Refill: 2    3. Atherosclerosis of aorta  Patient with Atherosclerosis of the Aorta.  Stable/asymptomatic. Currently stable on lipid lowering medication and blood pressure monitoring.  Overview:  noted on CXR 11/17/2014      4. Prediabetes  Lab Results   Component Value Date    HGBA1C 5.8 (H) 03/13/2023     Stable. We discussed low sugar/low carbohydrate diet and regular exercise to prevent progression. No need for prescription medication at this time.  Check A1c yearly.    5. Gastroesophageal reflux disease without esophagitis  Symptoms controlled: yes - takes medication occasionally as needed.   Reflux precautions discussed (eliminate tobacco if a smoker; minimize caffeine, chocolate and red/white peppermint intake; avoid heavy and spicy meals; don't lay down within 2-3 hours after eating; minimize the intake of NSAIDs). Medication as needed.   Patient asked to take medication breaks, if possible - discussed chronic use can limit calcium absorption (which can lead to osteopenia/osteoporosis), increases the risk for intestinal infections, and can cause kidney damage. There are also some newer studies that show possible increased risk of mortality.  -     omeprazole (PRILOSEC) 40 MG capsule; Take 1 capsule (40 mg total) by mouth daily as  needed (heartburn). Take only as needed.  Dispense: 90 capsule; Refill: 0    6. Severe obesity (BMI 35.0-39.9) with comorbidity  BMI Readings from Last 3 Encounters:   03/17/23 37.42 kg/m²   02/14/22 37.84 kg/m²   12/14/21 41.25 kg/m²     The patient is asked to continue to make an attempt to improve diet and exercise patterns to aid in medical management of this problem.     7. Need for Tdap vaccination  Patient was advised to get immunization at the pharmacy.    8. Need for shingles vaccine  Patient was advised to get immunization at the pharmacy.    9. Screening mammogram, encounter for    -     Mammo Digital Screening Bilat w/ Kalpesh; Future; Expected date: 03/17/2023    10. Intermediate stage nonexudative age-related macular degeneration of both eyes  She reports that her vision has improved slightly after recent eye injection.  She is followed by Ophthalmology.    I discussed with the patient that toenail care is generally not covered by insurance.  She will check with them and is aware that there may be a co-pay if she does decide to see Podiatry.            Orders Placed This Encounter   Procedures    Mammo Digital Screening Bilat w/ Kalpesh      Follow up in about 1 year (around 3/17/2024), or if symptoms worsen or fail to improve, for annual exam. or sooner as needed.

## 2023-05-24 ENCOUNTER — PROCEDURE VISIT (OUTPATIENT)
Dept: OPHTHALMOLOGY | Facility: CLINIC | Age: 78
End: 2023-05-24
Attending: OPHTHALMOLOGY
Payer: MEDICARE

## 2023-05-24 DIAGNOSIS — H35.3231 EXUDATIVE AGE-RELATED MACULAR DEGENERATION OF BOTH EYES WITH ACTIVE CHOROIDAL NEOVASCULARIZATION: Primary | ICD-10-CM

## 2023-05-24 PROCEDURE — 99499 UNLISTED E&M SERVICE: CPT | Mod: S$PBB,,, | Performed by: OPHTHALMOLOGY

## 2023-05-24 PROCEDURE — 67028 INJECTION EYE DRUG: CPT | Mod: 50,S$PBB,, | Performed by: OPHTHALMOLOGY

## 2023-05-24 PROCEDURE — 67028 PR INJECT INTRAVITREAL PHARMCOLOGIC: ICD-10-PCS | Mod: 50,S$PBB,, | Performed by: OPHTHALMOLOGY

## 2023-05-24 PROCEDURE — 92134 POSTERIOR SEGMENT OCT RETINA (OCULAR COHERENCE TOMOGRAPHY)-BOTH EYES: ICD-10-PCS | Mod: 26,S$PBB,, | Performed by: OPHTHALMOLOGY

## 2023-05-24 PROCEDURE — 92134 CPTRZ OPH DX IMG PST SGM RTA: CPT | Mod: PBBFAC,PO | Performed by: OPHTHALMOLOGY

## 2023-05-24 PROCEDURE — 67028 INJECTION EYE DRUG: CPT | Mod: 50,PBBFAC,PO | Performed by: OPHTHALMOLOGY

## 2023-05-24 PROCEDURE — 99499 NO LOS: ICD-10-PCS | Mod: S$PBB,,, | Performed by: OPHTHALMOLOGY

## 2023-05-24 RX ADMIN — Medication 1.25 MG: at 02:05

## 2023-05-24 NOTE — PROGRESS NOTES
Subjective:       Patient ID: Francoise De Jesus is a 77 y.o. female      No chief complaint on file.    History of Present Illness  HPI    10 wks DFE// OCTm and Avastin OU      DLS: 01/11/2023 by Dr. ARIELLE Potter MD    CC: pt reports vision is stable and good OU.   No complaints    No blurred  Va  No eye pain   No diplopia  No flashes //floaters   No headaches  No curtain/shadows/veils    Eye med:   ATs PRN OU  Preservision     POHx:   1. Exudative ARMD  OD w/ Active Choroidal NVO  S/P Avastin OD (02/16/2022)    S/P Avastin OS ( 03/23/2022)    S/P Avastin OU (05/04/2022) (07/06/2022) ( 09/28/2022) (01/11/2023)   (03/15/2023)  Last edited by Enrique Potter MD on 5/24/2023  2:38 PM.        Imaging:    See report    Assessment/Plan:     1. Exudative age-related macular degeneration of both eyes with active choroidal neovascularization  Controlled OU today 10 wks s/p Av OU    Rec Av OU today and TREX to 11 wks  Discussed q 12 wks/3 mo as decision to be made in future    RBA to inj discussed again    - Prior authorization Order  - Posterior Segment OCT Retina-Both eyes    Follow up in about 11 weeks (around 8/9/2023), or if symptoms worsen or fail to improve, for OCT and INJECTION ONLY, Injection Right eye, Injection Left eye, Avastin.     Patient identified.  Timeout performed.    Risks, benefits, and alternatives to treatment were discussed in detail with the patient, including bleeding/infection (endophthalmitis)/etc.  The patient voiced understanding and wished to proceed with the procedure.  See separate consent form.    Injection Procedure Note:  Diagnosis: Wet AMD Right Eye    Topical Proparacaine drop placed then topical 5% Betadine  Sterile gloves used, and sterile lid speculum placed.  5% Betadine placed at injection site again prior to injection.  Avastin 1.25mg in 0.05cc Injected inferotemporally 3.5-4mm posterior to the limbus.  Complications: None  Va at least CF at 5 feet post injection.  Retina, ONH,  IOP normal after injection.    Followup as above.  Patient should return immediately PRN.  Retinal Detachment and Endophthalmitis precautions given.    Patient identified.  Timeout performed.    Risks, benefits, and alternatives to treatment were discussed in detail with the patient, including bleeding/infection (endophthalmitis)/etc.  The patient voiced understanding and wished to proceed with the procedure.  See separate consent form.    Injection Procedure Note:  Diagnosis: Wet AMD Left Eye    Topical Proparacaine drop placed then topical 5% Betadine  Sterile gloves used, and sterile lid speculum placed.  5% Betadine placed at injection site again prior to injection.  Avastin 1.25mg in 0.05cc Injected inferotemporally 3.5-4mm posterior to the limbus.  Complications: None  Va at least CF at 5 feet post injection.  Retina, ONH, IOP normal after injection.    Followup as above.  Patient should return immediately PRN.  Retinal Detachment and Endophthalmitis precautions given.

## 2023-06-30 ENCOUNTER — PES CALL (OUTPATIENT)
Dept: ADMINISTRATIVE | Facility: CLINIC | Age: 78
End: 2023-06-30
Payer: MEDICARE

## 2023-07-13 ENCOUNTER — PES CALL (OUTPATIENT)
Dept: ADMINISTRATIVE | Facility: CLINIC | Age: 78
End: 2023-07-13
Payer: MEDICARE

## 2023-08-09 ENCOUNTER — PROCEDURE VISIT (OUTPATIENT)
Dept: OPHTHALMOLOGY | Facility: CLINIC | Age: 78
End: 2023-08-09
Attending: OPHTHALMOLOGY
Payer: MEDICARE

## 2023-08-09 DIAGNOSIS — H35.3231 EXUDATIVE AGE-RELATED MACULAR DEGENERATION OF BOTH EYES WITH ACTIVE CHOROIDAL NEOVASCULARIZATION: Primary | ICD-10-CM

## 2023-08-09 PROCEDURE — 92134 POSTERIOR SEGMENT OCT RETINA (OCULAR COHERENCE TOMOGRAPHY)-BOTH EYES: ICD-10-PCS | Mod: 26,S$PBB,, | Performed by: OPHTHALMOLOGY

## 2023-08-09 PROCEDURE — 67028 INJECTION EYE DRUG: CPT | Mod: 50,PBBFAC,PO | Performed by: OPHTHALMOLOGY

## 2023-08-09 PROCEDURE — 99499 NO LOS: ICD-10-PCS | Mod: S$PBB,,, | Performed by: OPHTHALMOLOGY

## 2023-08-09 PROCEDURE — 67028 PR INJECT INTRAVITREAL PHARMCOLOGIC: ICD-10-PCS | Mod: 50,S$PBB,, | Performed by: OPHTHALMOLOGY

## 2023-08-09 PROCEDURE — 67028 INJECTION EYE DRUG: CPT | Mod: 50,S$PBB,, | Performed by: OPHTHALMOLOGY

## 2023-08-09 PROCEDURE — 92134 CPTRZ OPH DX IMG PST SGM RTA: CPT | Mod: PBBFAC,PO | Performed by: OPHTHALMOLOGY

## 2023-08-09 PROCEDURE — 99499 UNLISTED E&M SERVICE: CPT | Mod: S$PBB,,, | Performed by: OPHTHALMOLOGY

## 2023-08-09 RX ADMIN — Medication 1.25 MG: at 01:08

## 2023-08-09 NOTE — PATIENT INSTRUCTIONS

## 2023-08-09 NOTE — PROGRESS NOTES
Subjective:       Patient ID: Francoise De Jesus is a 77 y.o. female      Chief Complaint   Patient presents with    Macular Degeneration    Injections     History of Present Illness  HPI    11 wks DFE// OCTm and Avastin OU    DLS: 05/24/2023 by Dr. ARIELLE Potter MD       Pt sts no change in vision since last visit.  Denies any eye pain     (-)Flashes (-)Floaters  (-)Photophobia  (-)Glare    EYEMEDS:   At's PRN   AREDS BID     Last edited by Enrique Potter MD on 8/9/2023  1:45 PM.        Imaging:    See report    Assessment/Plan:     1. Exudative age-related macular degeneration of both eyes with active choroidal neovascularization  Controlled OU today 11 wks s/p Av OU    Rec Av OU today and TREX to 12 wks  Discussed q 12 wks/3 mo as decision to be made in future    - Prior authorization Order  - Posterior Segment OCT Retina-Both eyes    Follow up in about 12 weeks (around 11/1/2023), or if symptoms worsen or fail to improve, for Comprehensive Examination, OCT Mac, Injection Right eye, Injection Left eye, Avastin.     Patient identified.  Timeout performed.    Risks, benefits, and alternatives to treatment were discussed in detail with the patient, including bleeding/infection (endophthalmitis)/etc.  The patient voiced understanding and wished to proceed with the procedure.  See separate consent form.    Injection Procedure Note:  Diagnosis: Wet AMD Right Eye    Topical Proparacaine drop placed then topical 5% Betadine  Sterile gloves used, and sterile lid speculum placed.  5% Betadine placed at injection site again prior to injection.  Avastin 1.25mg in 0.05cc Injected inferotemporally 3.5-4mm posterior to the limbus.  Complications: None  Va at least CF at 5 feet post injection.  Retina, ONH, IOP normal after injection.    Followup as above.  Patient should return immediately PRN.  Retinal Detachment and Endophthalmitis precautions given.    Patient identified.  Timeout performed.    Risks, benefits, and  alternatives to treatment were discussed in detail with the patient, including bleeding/infection (endophthalmitis)/etc.  The patient voiced understanding and wished to proceed with the procedure.  See separate consent form.    Injection Procedure Note:  Diagnosis: Wet AMD Left Eye    Topical Proparacaine drop placed then topical 5% Betadine  Sterile gloves used, and sterile lid speculum placed.  5% Betadine placed at injection site again prior to injection.  Avastin 1.25mg in 0.05cc Injected inferotemporally 3.5-4mm posterior to the limbus.  Complications: None  Va at least CF at 5 feet post injection.  Retina, ONH, IOP normal after injection.    Followup as above.  Patient should return immediately PRN.  Retinal Detachment and Endophthalmitis precautions given.

## 2023-08-23 DIAGNOSIS — K21.9 GASTROESOPHAGEAL REFLUX DISEASE WITHOUT ESOPHAGITIS: ICD-10-CM

## 2023-08-23 RX ORDER — OMEPRAZOLE 40 MG/1
CAPSULE, DELAYED RELEASE ORAL
Qty: 90 CAPSULE | Refills: 1 | Status: SHIPPED | OUTPATIENT
Start: 2023-08-23 | End: 2024-02-26 | Stop reason: SDUPTHER

## 2023-08-23 NOTE — TELEPHONE ENCOUNTER
Refill Decision Note   Francoise Liza  is requesting a refill authorization.  Brief Assessment and Rationale for Refill:  Approve     Medication Therapy Plan:         Comments:     Note composed:9:57 AM 08/23/2023

## 2023-08-23 NOTE — TELEPHONE ENCOUNTER
No care due was identified.  [facility] Embedded Care Due Messages. Reference number: [id number].   8/23/2023 9:15:51 AM CDT

## 2023-09-29 ENCOUNTER — OFFICE VISIT (OUTPATIENT)
Dept: HOME HEALTH SERVICES | Facility: CLINIC | Age: 78
End: 2023-09-29
Payer: MEDICARE

## 2023-09-29 VITALS
HEART RATE: 70 BPM | WEIGHT: 195 LBS | DIASTOLIC BLOOD PRESSURE: 60 MMHG | BODY MASS INDEX: 36.82 KG/M2 | SYSTOLIC BLOOD PRESSURE: 129 MMHG | HEIGHT: 61 IN

## 2023-09-29 DIAGNOSIS — Z00.00 ENCOUNTER FOR PREVENTIVE HEALTH EXAMINATION: Primary | ICD-10-CM

## 2023-09-29 DIAGNOSIS — Z85.3 HISTORY OF BREAST CANCER: ICD-10-CM

## 2023-09-29 DIAGNOSIS — H35.3231 EXUDATIVE AGE-RELATED MACULAR DEGENERATION OF BOTH EYES WITH ACTIVE CHOROIDAL NEOVASCULARIZATION: ICD-10-CM

## 2023-09-29 DIAGNOSIS — I70.0 ATHEROSCLEROSIS OF AORTA: ICD-10-CM

## 2023-09-29 DIAGNOSIS — R73.03 PREDIABETES: ICD-10-CM

## 2023-09-29 DIAGNOSIS — I12.9 BENIGN HYPERTENSION WITH CHRONIC KIDNEY DISEASE, STAGE III: ICD-10-CM

## 2023-09-29 DIAGNOSIS — N18.31 STAGE 3A CHRONIC KIDNEY DISEASE: ICD-10-CM

## 2023-09-29 DIAGNOSIS — E66.01 SEVERE OBESITY (BMI 35.0-39.9) WITH COMORBIDITY: ICD-10-CM

## 2023-09-29 DIAGNOSIS — N18.30 BENIGN HYPERTENSION WITH CHRONIC KIDNEY DISEASE, STAGE III: ICD-10-CM

## 2023-09-29 DIAGNOSIS — K21.9 GASTROESOPHAGEAL REFLUX DISEASE WITHOUT ESOPHAGITIS: ICD-10-CM

## 2023-09-29 PROCEDURE — G0439 PR MEDICARE ANNUAL WELLNESS SUBSEQUENT VISIT: ICD-10-PCS | Mod: S$GLB,,, | Performed by: NURSE PRACTITIONER

## 2023-09-29 PROCEDURE — G0439 PPPS, SUBSEQ VISIT: HCPCS | Mod: S$GLB,,, | Performed by: NURSE PRACTITIONER

## 2023-09-29 NOTE — PROGRESS NOTES
"  Francoise De Jesus presented for a  Medicare AWV and comprehensive Health Risk Assessment today. The following components were reviewed and updated:    Medical history  Family History  Social history  Allergies and Current Medications  Health Risk Assessment  Health Maintenance  Care Team         ** See Completed Assessments for Annual Wellness Visit within the encounter summary.**         The following assessments were completed:  Living Situation  CAGE  Depression Screening  Timed Get Up and Go  Whisper Test  Cognitive Function Screening      Nutrition Screening  ADL Screening  PAQ Screening        Vitals:    09/29/23 1129   BP: 129/60   Pulse: 70   Weight: 88.5 kg (195 lb)   Height: 5' 1" (1.549 m)     Body mass index is 36.84 kg/m².  Physical Exam  Constitutional:       Appearance: She is obese.   HENT:      Head: Normocephalic and atraumatic.      Nose: Nose normal.      Mouth/Throat:      Mouth: Mucous membranes are moist.   Eyes:      Extraocular Movements: Extraocular movements intact.   Cardiovascular:      Rate and Rhythm: Normal rate and regular rhythm.      Heart sounds: Normal heart sounds.   Pulmonary:      Effort: Pulmonary effort is normal. No respiratory distress.      Breath sounds: Normal breath sounds.   Abdominal:      General: Bowel sounds are normal. There is no distension.      Palpations: Abdomen is soft.   Musculoskeletal:         General: No swelling. Normal range of motion.      Cervical back: Normal range of motion.   Skin:     General: Skin is warm and dry.   Neurological:      General: No focal deficit present.      Mental Status: She is alert and oriented to person, place, and time.   Psychiatric:         Mood and Affect: Mood normal.         Behavior: Behavior normal.               Diagnoses and health risks identified today and associated recommendations/orders:    1. Encounter for preventive health examination  Assessments completed. Preventive measures and health maintenance reviewed " with patient.  Review for opioid screening: Patient does not have any prescriptions for opioids.  Review for substance use disorder: Patient does not use any substances.    2. Benign hypertension with chronic kidney disease, stage III  Stable, patient on Hydrochlorothiazide and Lisinopril. Followed by PCP.    3. Atherosclerosis of aorta  Stable, patient on Aspirin. Followed by PCP.    4. Stage 3a chronic kidney disease  Stable, followed by PCP.    5. Exudative age-related macular degeneration of both eyes with active choroidal neovascularization  Stable, followed by Optometry.    6. Severe obesity (BMI 35.0-39.9) with comorbidity  Stable, followed by PCP. Healthy diet and tolerable exercises encouraged.    7. Prediabetes  Stable, followed by PCP. Last A1C 5.8.    8. Gastroesophageal reflux disease without esophagitis  Stable, patient on Prilosec. Followed by PCP.    9. History of breast cancer  Stable, followed by PCP.      Provided Munday with a 5-10 year written screening schedule and personal prevention plan. Recommendations were developed using the USPSTF age appropriate recommendations. Education, counseling, and referrals were provided as needed. After Visit Summary printed and given to patient which includes a list of additional screenings\tests needed.    Follow up in about 1 year (around 9/29/2024) for your next annual wellness visit.    Shelbie Bradley, LITA  I offered to discuss advanced care planning, including how to pick a person who would make decisions for you if you were unable to make them for yourself, called a health care power of , and what kind of decisions you might make such as use of life sustaining treatments such as ventilators and tube feeding when faced with a life limiting illness recorded on a living will that they will need to know. (How you want to be cared for as you near the end of your natural life)     X Patient is interested in learning more about how to make advanced  directives.  I provided them paperwork and offered to discuss this with them.

## 2023-10-01 PROBLEM — H35.3231 EXUDATIVE AGE-RELATED MACULAR DEGENERATION OF BOTH EYES WITH ACTIVE CHOROIDAL NEOVASCULARIZATION: Status: ACTIVE | Noted: 2021-12-14

## 2023-11-01 ENCOUNTER — PROCEDURE VISIT (OUTPATIENT)
Dept: OPHTHALMOLOGY | Facility: CLINIC | Age: 78
End: 2023-11-01
Attending: OPHTHALMOLOGY
Payer: MEDICARE

## 2023-11-01 DIAGNOSIS — H35.3231 EXUDATIVE AGE-RELATED MACULAR DEGENERATION OF BOTH EYES WITH ACTIVE CHOROIDAL NEOVASCULARIZATION: ICD-10-CM

## 2023-11-01 DIAGNOSIS — H35.3134 ADVANCED ATROPHIC NONEXUDATIVE AGE-RELATED MACULAR DEGENERATION OF BOTH EYES WITH SUBFOVEAL INVOLVEMENT: Primary | ICD-10-CM

## 2023-11-01 PROCEDURE — 67028 INJECTION EYE DRUG: CPT | Mod: S$PBB,50,, | Performed by: OPHTHALMOLOGY

## 2023-11-01 PROCEDURE — 67028 PR INJECT INTRAVITREAL PHARMCOLOGIC: ICD-10-PCS | Mod: S$PBB,50,, | Performed by: OPHTHALMOLOGY

## 2023-11-01 PROCEDURE — 99499 NO LOS: ICD-10-PCS | Mod: S$PBB,,, | Performed by: OPHTHALMOLOGY

## 2023-11-01 PROCEDURE — 92134 POSTERIOR SEGMENT OCT RETINA (OCULAR COHERENCE TOMOGRAPHY)-BOTH EYES: ICD-10-PCS | Mod: 26,S$PBB,, | Performed by: OPHTHALMOLOGY

## 2023-11-01 PROCEDURE — 99499 UNLISTED E&M SERVICE: CPT | Mod: S$PBB,,, | Performed by: OPHTHALMOLOGY

## 2023-11-01 PROCEDURE — 92134 CPTRZ OPH DX IMG PST SGM RTA: CPT | Mod: PBBFAC,PO | Performed by: OPHTHALMOLOGY

## 2023-11-01 PROCEDURE — 67028 INJECTION EYE DRUG: CPT | Mod: 50,PBBFAC,PO | Performed by: OPHTHALMOLOGY

## 2023-11-01 RX ADMIN — Medication 1.25 MG: at 02:11

## 2023-11-05 NOTE — PROGRESS NOTES
Subjective:       Patient ID: Francoise De Jesus is a 78 y.o. female      Chief Complaint   Patient presents with    Macular Degeneration     F/u as instructed     History of Present Illness  HPI     Macular Degeneration     Additional comments: F/u as instructed           Comments    Pt states no change since her last visit reports vision is stable and   doing well overall.     -Flashes   -Floaters   -pain     Eyemeds;   At's prn Ou   Areds-2 BID PO     Fhx: Brother glaucoma           Last edited by Enrique Potter MD on 11/5/2023  3:24 PM.        Imaging:    See report    Assessment/Plan:     1. Exudative age-related macular degeneration of both eyes with active choroidal neovascularization  Controlled OU today after TREX to 12 wks wks s/p Av OU    No adverse sequelae from treatment    IOP good, ONH appears stable OU    Rec Av OU today and q 12 wks    RBA to inj discussed again    - Prior authorization Order  - Posterior Segment OCT Retina-Both eyes    1. Advanced atrophic nonexudative age-related macular degeneration of both eyes with subfoveal involvement  Discussed preserved Va OS.  Discussed possible progression with or without Rx but could poss slow down with Syfovre inj  RBA discussed inc IOI, ION, endophth, wet AMD.   Pt wishes to have injections OS    - Prior authorization Order      Follow up in about 1 month (around 12/1/2023), or if symptoms worsen or fail to improve, for OCT and INJECTION ONLY, Injection Left eye Syfovre.     Patient identified.  Timeout performed.    Risks, benefits, and alternatives to treatment were discussed in detail with the patient, including bleeding/infection (endophthalmitis)/etc.  The patient voiced understanding and wished to proceed with the procedure.  See separate consent form.    Injection Procedure Note:  Diagnosis: Wet AMD Right Eye    Topical Proparacaine drop placed then topical 5% Betadine  Sterile gloves used, and sterile lid speculum placed.  5% Betadine placed at  injection site again prior to injection.  Avastin 1.25mg in 0.05cc Injected inferotemporally 3.5-4mm posterior to the limbus.  Complications: None  Va at least CF at 5 feet post injection.  Retina, ONH, IOP normal after injection.    Followup as above.  Patient should return immediately PRN.  Retinal Detachment and Endophthalmitis precautions given.    Patient identified.  Timeout performed.    Risks, benefits, and alternatives to treatment were discussed in detail with the patient, including bleeding/infection (endophthalmitis)/etc.  The patient voiced understanding and wished to proceed with the procedure.  See separate consent form.    Injection Procedure Note:  Diagnosis: Wet AMD Left Eye    Topical Proparacaine drop placed then topical 5% Betadine  Sterile gloves used, and sterile lid speculum placed.  5% Betadine placed at injection site again prior to injection.  Avastin 1.25mg in 0.05cc Injected inferotemporally 3.5-4mm posterior to the limbus.  Complications: None  Va at least CF at 5 feet post injection.  Retina, ONH, IOP normal after injection.    Followup as above.  Patient should return immediately PRN.  Retinal Detachment and Endophthalmitis precautions given.

## 2023-11-13 NOTE — PATIENT INSTRUCTIONS
Counseling and Referral of Other Preventative  (Italic type indicates deductible and co-insurance are waived)    Patient Name: Francoise De Jesus  Today's Date: 9/29/2023    Health Maintenance       Date Due Completion Date    TETANUS VACCINE Never done ---    Shingles Vaccine (2 of 2) 09/08/2021 7/14/2021    COVID-19 Vaccine (4 - Pfizer series) 12/18/2021 10/23/2021    Mammogram 03/11/2022 3/11/2021    Influenza Vaccine (1) 09/01/2023 10/3/2022    Lipid Panel 03/13/2024 3/13/2023    Hemoglobin A1c 03/13/2024 3/13/2023        No orders of the defined types were placed in this encounter.    The following information is provided to all patients.  This information is to help you find resources for any of the problems found today that may be affecting your health:                Living healthy guide: www.[facility].[place].[facility]      Understanding Diabetes: www.diabetes.org      Eating healthy: www.cdc.gov/healthyweight      [facility] [place][note 2] home safety checklist: www.cdc.gov/steadi/patient.html      Agency on Aging: www.goea.[place].[facility]      Alcoholics anonymous (AA): www.aa.org      Physical Activity: www.andrea.nih.gov/rb7vsqw      Tobacco use: www.quitwithusla.org      Virtual/Telephone Check-In    Zackary Murry verbally consents to a Virtual/Telephone Check-In service on 11/13/23. Patient has been referred to the [facility] website at www.health.org/consents to review the yearly Consent to Treat document. Patient understands and accepts financial responsibility for any deductible, co-insurance and/or co-pays associated with this service. Telehealth Verbal Consent   I conducted a telehealth visit with Zackary Murry today, 11/13/23, which was completed using two-way, real-time interactive audio and video communication. This has been done in good michelle to provide continuity of care in the best interest of the provider-patient relationship, due to the COVID -19 public health crisis/national emergency where restrictions of face-to-face office visits are ongoing. Every conscious effort was taken to allow for sufficient and adequate time to complete the visit. The patient was made aware of the limitations of the telehealth visit, including treatment limitations as no physical exam could be performed. The patient was advised to call 911 or to go to the ER in case there was an emergency. The patient was also advised of the potential privacy & security concerns related to the telehealth platform. The patient was made aware of where to find [facility] notice of privacy practices, telehealth consent form and other related consent forms and documents. which are located on the [facility] website. The patient verbally agreed to telehealth consent form, related consents and the risks discussed. Lastly, the patient confirmed that they were in [place]. Included in this visit, time may have been spent reviewing labs, medications, radiology tests and decision making. Appropriate medical decision-making and tests are ordered as detailed in the plan of care above. Coding/billing information is submitted for this visit based on complexity of care and/or time spent for the visit.     CHIEF COMPLAINT:  Chief Complaint   Patient presents with    Rash       HPI:  Shady Strong is a 53year old female who presents for a video visit. Patient reports flare up of roscea for the past 2 days. Was using clindamycin in the past until it stopped working. Saw provider 1 week ago and was prescribed topical brimonidine. States that it is not helping and reports persisting facial erythema and some papules as well. Current Outpatient Medications   Medication Sig Dispense Refill    metroNIDAZOLE 1 % External Gel Apply 1 Application topically daily. 60 g 0    losartan 50 MG Oral Tab Take 1 tablet (50 mg total) by mouth daily. 90 tablet 0    methylPREDNISolone 4 MG Oral Tablet Therapy Pack Take as prescribed on  pack 21 tablet 0    methylPREDNISolone 4 MG Oral Tablet Therapy Pack Take as prescribed on pack. 21 tablet 0    ALPRAZolam 0.25 MG Oral Tab Take 1 tablet (0.25 mg total) by mouth every 6 (six) hours as needed. 30 tablet 0    cyanocobalamin 1000 MCG Oral Tab Take 1 tablet (1,000 mcg total) by mouth daily. Cholecalciferol (VITAMIN D) 50 MCG (2000 UT) Oral Cap Take by mouth. ferrous sulfate 325 (65 FE) MG Oral Tab EC Take 1 tablet (325 mg total) by mouth daily with breakfast.      montelukast (SINGULAIR) 10 MG Oral Tab Take 1 tablet (10 mg total) by mouth daily. 90 tablet 3    Calcium Polycarbophil (FIBER) 625 MG Oral Tab Take 1 tablet (625 mg total) by mouth daily as needed. Fluticasone Propionate 50 MCG/ACT Nasal Suspension 1 spray by Nasal route daily. loratadine 10 MG Oral Tab Take 1 tablet (10 mg total) by mouth daily.        Past Medical History:   Diagnosis Date    Abdominal pain, left lower quadrant     Abnormal uterine bleeding     spotting for 3 days    Acute diverticulitis 5/4/2021    Acute pharyngitis     Allergic rhinitis, cause unspecified     Amenorrhea     Mirena    Anxiety state, unspecified     Asthma, mild intermittent     Diverticulosis     Esophageal reflux     JARED (generalized anxiety disorder)     GERD (gastroesophageal reflux disease)     H/O mammogram 2014    benign    Headache(784.0)     High blood pressure     Other abnormal blood chemistry     Other atopic dermatitis and related conditions     Other malaise and fatigue     Pap smear for cervical cancer screening 1-    wnl pt stated    PONV (postoperative nausea and vomiting)     Unspecified constipation     Unspecified essential hypertension     Uterine fibroid     Visual impairment     glasses/contacts     Past Surgical History:   Procedure Laterality Date    CHOLECYSTECTOMY  1998    COLECTOMY  2021    Dr. Latasha Rosa, POSSIBLE TRANSVERSE COLON SEGMENTAL    COLONOSCOPY      COLONOSCOPY  2017    hp polyp, diverticulosis. repeat 10 year    COLONOSCOPY N/A 2017    Procedure: COLONOSCOPY, POSSIBLE BIOPSY, POSSIBLE POLYPECTOMY 91130;  Surgeon: Nic Ni MD;  Location: [facility]    HYSTERECTOMY  2015    total hysterectomy due to myomas. intact ovaries. MIRENA, IUD  2013    OTHER SURGICAL HISTORY      elective     OTHER SURGICAL HISTORY  2021    Cysto (Dr. Myra Bosworth)    Theora Nigh    TVT taping of cystocele    UPPER ARM/ELBOW SURGERY UNLISTED      repair fx of LUE       Social History     Socioeconomic History    Marital status:    Tobacco Use    Smoking status: Never    Smokeless tobacco: Never   Vaping Use    Vaping Use: Never used   Substance and Sexual Activity    Alcohol use: Yes     Comment: twice per month    Drug use: No    Sexual activity: Yes     Partners: Male   Other Topics Concern    Caffeine Concern Yes     Comment: 2 cups daily    Exercise Yes     Comment: 7 x a week.  walk        REVIEW OF SYSTEMS:  GENERAL: normal appetite  SKIN: see HPI  HEENT: See HPI  LUNGS: denies shortness of breath or wheezing, See HPI  CARDIOVASCULAR: denies chest pain or palpitations   GI: denies N/V/C or abdominal pain  NEURO: Denies headaches    EXAM:  General: Alert, Well-appearing, and In no acute distress  Respiratory:   Speaking in full sentences comfortably  Normal work of breathing  No cough during visit  Head: Normocephalic  Nose: No obvious nasal discharge. Skin: facial erythema noted to bilateral cheeks and across nasal area. Pt reports papules to upper cheek area     No results found for this or any previous visit (from the past 24 hour(s)). ASSESSMENT AND PLAN:  Araseli Gaxiola is a 53year old female who presents with symptoms that are consistent with    ASSESSMENT:   Encounter Diagnosis   Name Primary? Rosacea Yes       PLAN: Now with papular lesion involvement will start on metronidazole topical once daily. Meds as below. See patient Instructions    Meds & Refills for this Visit:  Requested Prescriptions     Signed Prescriptions Disp Refills    metroNIDAZOLE 1 % External Gel 60 g 0     Sig: Apply 1 Application topically daily. Risks, benefits, and side effects of medication explained and discussed. There are no Patient Instructions on file for this visit. The patient indicates understanding of these issues and agrees to the plan. The patient is asked to f/u with PCP or derm if sx's persist or worsen. Araseli Gaxiola understands video visit evaluation is not a substitute for face-to-face examination or emergency care. Patient advised to go to ER or call 911 for worsening symptoms or acute distress.

## 2023-12-06 ENCOUNTER — PROCEDURE VISIT (OUTPATIENT)
Dept: OPHTHALMOLOGY | Facility: CLINIC | Age: 78
End: 2023-12-06
Attending: OPHTHALMOLOGY
Payer: MEDICARE

## 2023-12-06 DIAGNOSIS — H35.3134 ADVANCED ATROPHIC NONEXUDATIVE AGE-RELATED MACULAR DEGENERATION OF BOTH EYES WITH SUBFOVEAL INVOLVEMENT: Primary | ICD-10-CM

## 2023-12-06 PROCEDURE — 92134 POSTERIOR SEGMENT OCT RETINA (OCULAR COHERENCE TOMOGRAPHY)-BOTH EYES: ICD-10-PCS | Mod: 26,S$PBB,, | Performed by: OPHTHALMOLOGY

## 2023-12-06 PROCEDURE — 99499 UNLISTED E&M SERVICE: CPT | Mod: S$PBB,,, | Performed by: OPHTHALMOLOGY

## 2023-12-06 PROCEDURE — 99999PBSHW PR PBB SHADOW TECHNICAL ONLY FILED TO HB: Mod: JZ,PBBFAC,,

## 2023-12-06 PROCEDURE — 92134 CPTRZ OPH DX IMG PST SGM RTA: CPT | Mod: PBBFAC,PO | Performed by: OPHTHALMOLOGY

## 2023-12-06 PROCEDURE — 67028 PR INJECT INTRAVITREAL PHARMCOLOGIC: ICD-10-PCS | Mod: S$PBB,LT,, | Performed by: OPHTHALMOLOGY

## 2023-12-06 PROCEDURE — 99999PBSHW PR PBB SHADOW TECHNICAL ONLY FILED TO HB: ICD-10-PCS | Mod: JZ,PBBFAC,,

## 2023-12-06 PROCEDURE — 67028 INJECTION EYE DRUG: CPT | Mod: PBBFAC,PO,LT | Performed by: OPHTHALMOLOGY

## 2023-12-06 PROCEDURE — 67028 INJECTION EYE DRUG: CPT | Mod: S$PBB,LT,, | Performed by: OPHTHALMOLOGY

## 2023-12-06 PROCEDURE — 99499 NO LOS: ICD-10-PCS | Mod: S$PBB,,, | Performed by: OPHTHALMOLOGY

## 2023-12-06 RX ADMIN — PEGCETACOPLAN 15 MG: 15 INJECTION, SOLUTION INTRAVITREAL at 03:12

## 2023-12-06 NOTE — PROGRESS NOTES
Subjective:       Patient ID: Francoise De Jesus is a 78 y.o. female      Chief Complaint   Patient presents with    Injections     History of Present Illness  HPI    1 month oct/Syfovre os only   Dls: 11/01/2023 Dr. Potter.     Pt states no change since last visit.   -Flashes   -Floaters   -Pain     Eye meds;   At's prn Ou   Areds-2  Po   Last edited by Enrique Potter MD on 12/6/2023  2:52 PM.        Imaging:    See report    Assessment/Plan:     1. Exudative age-related macular degeneration of both eyes with active choroidal neovascularization  Controlled OU today after TREX to 12 wks wks s/p Av OU    No adverse sequelae from treatment    IOP good, ONH appears stable OU    Rec Av OU today and q 12 wks.  Due in 7 wks    RBA to inj discussed again    - Prior authorization Order  - Posterior Segment OCT Retina-Both eyes    1. Advanced atrophic nonexudative age-related macular degeneration of both eyes with subfoveal involvement  Prev discussed preserved Va OS.  Discussed possible progression with or without Rx but could poss slow down with Syfovre inj  RBA discussed inc IOI, ION, endophth, wet AMD.   Pt wishes to have injections OS  Start today     - Prior authorization Order      Follow up in about 12 weeks (around 2/28/2024), or if symptoms worsen or fail to improve, for OCT and INJECTION ONLY, Injection Right eye, Injection Left eye, Avastin.     Patient identified.  Timeout performed.    Risks, benefits, and alternatives to treatment were discussed in detail with the patient, including bleeding, infection (endophthalmitis), NAION, wet AMD, etc.  The patient voiced understanding and wished to proceed with the procedure.  See separate consent form.    Injection Procedure Note:  Diagnosis: Advanced dry AMD with geographic atrophy Left Eye    Topical Proparacaine drop placed then topical 5% Betadine  Sterile gloves used, and sterile lid speculum placed.  5% Betadine placed at tap and injection site again   AC tap  perfomed with 30ga needle on 1cc syringe  Syfovre 15mg in 0.1cc Injected inferotemporally 3.5-4mm posterior to the limbus.  Complications: None  Va at least CF at 5 feet post injection.  Retina, ONH, IOP normal after injection.    Followup as above.  Patient should return immediately PRN.  Retinal Detachment and Endophthalmitis precautions given.

## 2024-01-23 ENCOUNTER — PROCEDURE VISIT (OUTPATIENT)
Dept: OPHTHALMOLOGY | Facility: CLINIC | Age: 79
End: 2024-01-23
Attending: OPHTHALMOLOGY
Payer: MEDICARE

## 2024-01-23 DIAGNOSIS — H35.3134 ADVANCED ATROPHIC NONEXUDATIVE AGE-RELATED MACULAR DEGENERATION OF BOTH EYES WITH SUBFOVEAL INVOLVEMENT: Primary | ICD-10-CM

## 2024-01-23 DIAGNOSIS — H35.3231 EXUDATIVE AGE-RELATED MACULAR DEGENERATION OF BOTH EYES WITH ACTIVE CHOROIDAL NEOVASCULARIZATION: ICD-10-CM

## 2024-01-23 PROCEDURE — 99499 UNLISTED E&M SERVICE: CPT | Mod: S$PBB,,, | Performed by: OPHTHALMOLOGY

## 2024-01-23 PROCEDURE — 67028 INJECTION EYE DRUG: CPT | Mod: 50,PBBFAC,PO | Performed by: OPHTHALMOLOGY

## 2024-01-23 PROCEDURE — 92134 CPTRZ OPH DX IMG PST SGM RTA: CPT | Mod: PBBFAC,PO | Performed by: OPHTHALMOLOGY

## 2024-01-23 PROCEDURE — 99999PBSHW PR PBB SHADOW TECHNICAL ONLY FILED TO HB: Mod: PBBFAC,,,

## 2024-01-23 PROCEDURE — 67028 INJECTION EYE DRUG: CPT | Mod: 50,S$PBB,, | Performed by: OPHTHALMOLOGY

## 2024-01-23 RX ADMIN — Medication 1.25 MG: at 12:01

## 2024-01-23 NOTE — PROGRESS NOTES
Subjective:       Patient ID: Francoise De Jesus is a 78 y.o. female      Chief Complaint   Patient presents with    Injections     History of Present Illness  HPI    7 Week Oct/Oksana OU Only     Pt statets va is stable reports no changes.    -Flashes   -Floaters  -pain     Eye meds;   At's prn   Areds-2 po   Last edited by Enrique Potter MD on 1/23/2024 11:59 AM.        Imaging:    See report    Assessment/Plan:     1. Exudative age-related macular degeneration of both eyes with active choroidal neovascularization  Controlled OU today with q 12 wks Av OU    No adverse sequelae from treatment    IOP good, ONH appears stable OU    Rec Av OU today and q 12 wks    RBA to inj discussed again    - Prior authorization Order  - Posterior Segment OCT Retina-Both eyes    1. Advanced atrophic nonexudative age-related macular degeneration of both eyes with subfoveal involvement  7 wks s/p Syf #1    Due in 1 wk Sy OS    - Prior authorization Order      Follow up in about 1 week (around 1/30/2024), or if symptoms worsen or fail to improve, for Injection Left eye Syfovre.     Patient identified.  Timeout performed.    Risks, benefits, and alternatives to treatment were discussed in detail with the patient, including bleeding/infection (endophthalmitis)/etc.  The patient voiced understanding and wished to proceed with the procedure.  See separate consent form.    Injection Procedure Note:  Diagnosis: Wet AMD Right Eye    Topical Proparacaine drop placed then topical 5% Betadine  Sterile gloves used, and sterile lid speculum placed.  5% Betadine placed at injection site again prior to injection.  Avastin 1.25mg in 0.05cc Injected inferotemporally 3.5-4mm posterior to the limbus.  Complications: None  Va at least CF at 5 feet post injection.  Retina, ONH, IOP normal after injection.    Followup as above.  Patient should return immediately PRN.  Retinal Detachment and Endophthalmitis precautions given.    Patient identified.  Timeout  performed.    Risks, benefits, and alternatives to treatment were discussed in detail with the patient, including bleeding/infection (endophthalmitis)/etc.  The patient voiced understanding and wished to proceed with the procedure.  See separate consent form.    Injection Procedure Note:  Diagnosis: Wet AMD Left Eye    Topical Proparacaine drop placed then topical 5% Betadine  Sterile gloves used, and sterile lid speculum placed.  5% Betadine placed at injection site again prior to injection.  Avastin 1.25mg in 0.05cc Injected inferotemporally 3.5-4mm posterior to the limbus.  Complications: None  Va at least CF at 5 feet post injection.  Retina, ONH, IOP normal after injection.    Followup as above.  Patient should return immediately PRN.  Retinal Detachment and Endophthalmitis precautions given.

## 2024-01-30 ENCOUNTER — PROCEDURE VISIT (OUTPATIENT)
Dept: OPHTHALMOLOGY | Facility: CLINIC | Age: 79
End: 2024-01-30
Attending: OPHTHALMOLOGY
Payer: MEDICARE

## 2024-01-30 DIAGNOSIS — H35.3134 ADVANCED ATROPHIC NONEXUDATIVE AGE-RELATED MACULAR DEGENERATION OF BOTH EYES WITH SUBFOVEAL INVOLVEMENT: Primary | ICD-10-CM

## 2024-01-30 DIAGNOSIS — H35.3231 EXUDATIVE AGE-RELATED MACULAR DEGENERATION OF BOTH EYES WITH ACTIVE CHOROIDAL NEOVASCULARIZATION: ICD-10-CM

## 2024-01-30 PROCEDURE — 99999PBSHW PR PBB SHADOW TECHNICAL ONLY FILED TO HB: Mod: JZ,PBBFAC,,

## 2024-01-30 PROCEDURE — 99499 UNLISTED E&M SERVICE: CPT | Mod: S$PBB,,, | Performed by: OPHTHALMOLOGY

## 2024-01-30 PROCEDURE — 67028 INJECTION EYE DRUG: CPT | Mod: PBBFAC,PO,LT | Performed by: OPHTHALMOLOGY

## 2024-01-30 PROCEDURE — 67028 INJECTION EYE DRUG: CPT | Mod: S$PBB,LT,, | Performed by: OPHTHALMOLOGY

## 2024-01-30 RX ADMIN — PEGCETACOPLAN 15 MG: 15 INJECTION, SOLUTION INTRAVITREAL at 12:01

## 2024-01-30 NOTE — PROGRESS NOTES
Subjective:       Patient ID: Francoise De Jesus is a 78 y.o. female      Chief Complaint   Patient presents with    Follow-up     History of Present Illness  HPI    Sy OS only  Last edited by Enrique Potter MD on 1/30/2024 12:41 PM.        Imaging:    See report    Assessment/Plan:     1. Exudative age-related macular degeneration of both eyes with active choroidal neovascularization  Controlled OU today after TREX to 12 wks wks s/p Av OU    No adverse sequelae from treatment    IOP good, ONH appears stable OU    KEVIN OU q 12 wks  Due in 11 wkd    RBA to inj discussed again    - Prior authorization Order  - Posterior Segment OCT Retina-Both eyes    1. 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  Discussed possible progression with or without Rx but could poss slow down with Syfovre inj  RBA discussed inc IOI, ION, endophth, wet AMD.   Pt wishes to have injections OS  Start today     - Prior authorization Order      Follow up in about 8 weeks (around 3/26/2024) for OCT and INJECTION ONLY, Injection Left eye Syfovre OS.     Patient identified.  Timeout performed.    Risks, benefits, and alternatives to treatment were discussed in detail with the patient, including bleeding, infection (endophthalmitis), NAION, wet AMD, etc.  The patient voiced understanding and wished to proceed with the procedure.  See separate consent form.    Injection Procedure Note:  Diagnosis: Advanced dry AMD with geographic atrophy Left Eye    Topical Proparacaine drop placed then topical 5% Betadine  Sterile gloves used, and sterile lid speculum placed.  5% Betadine placed at tap and injection site again   Syfovre 15mg in 0.1cc Injected inferotemporally 3.5-4mm posterior to the limbus.  Complications: None  Va at least CF at 5 feet post injection.  Retina, ONH, IOP normal after injection.    Followup as  above.  Patient should return immediately PRN.  Retinal Detachment and Endophthalmitis precautions given.

## 2024-02-20 ENCOUNTER — TELEPHONE (OUTPATIENT)
Dept: FAMILY MEDICINE | Facility: CLINIC | Age: 79
End: 2024-02-20
Payer: MEDICARE

## 2024-02-20 NOTE — TELEPHONE ENCOUNTER
----- Message from Maribel Crowley sent at 2/20/2024 11:47 AM CST -----  Regarding: Refill request  .Type: RX Refill Request    Who Called:self     Have you contacted your pharmacy:yes     Refill or New Rx: Refill    RX Name and Strength:caller states she needs all medications refilled-states its about 5 medications     Preferred Pharmacy with phone number:.  [facility] - [phone]  [address]  [place] [address]  Phone: [phone] Fax: [phone]    Local or Mail Order:local     Ordering Provider:NIKHIL Auguste     Would the patient rather a call back or a response via [facility]? Call     Best Call Back Number:.[phone]        Additional Information: states she has about 1 week left of pills

## 2024-02-26 ENCOUNTER — TELEPHONE (OUTPATIENT)
Dept: FAMILY MEDICINE | Facility: CLINIC | Age: 79
End: 2024-02-26
Payer: MEDICARE

## 2024-02-26 DIAGNOSIS — E78.49 OTHER HYPERLIPIDEMIA: ICD-10-CM

## 2024-02-26 DIAGNOSIS — I12.9 BENIGN HYPERTENSION WITH CHRONIC KIDNEY DISEASE, STAGE III: ICD-10-CM

## 2024-02-26 DIAGNOSIS — K21.9 GASTROESOPHAGEAL REFLUX DISEASE WITHOUT ESOPHAGITIS: ICD-10-CM

## 2024-02-26 DIAGNOSIS — I70.0 ATHEROSCLEROSIS OF AORTA: ICD-10-CM

## 2024-02-26 DIAGNOSIS — N18.30 BENIGN HYPERTENSION WITH CHRONIC KIDNEY DISEASE, STAGE III: ICD-10-CM

## 2024-02-26 RX ORDER — LISINOPRIL 40 MG/1
40 TABLET ORAL DAILY
Qty: 90 TABLET | Refills: 0 | Status: SHIPPED | OUTPATIENT
Start: 2024-02-26 | End: 2024-03-21

## 2024-02-26 RX ORDER — ASPIRIN 81 MG/1
81 TABLET ORAL DAILY
Refills: 0 | Status: CANCELLED | COMMUNITY
Start: 2024-02-26 | End: 2025-02-25

## 2024-02-26 RX ORDER — OMEPRAZOLE 40 MG/1
CAPSULE, DELAYED RELEASE ORAL
Qty: 90 CAPSULE | Refills: 0 | Status: SHIPPED | OUTPATIENT
Start: 2024-02-26 | End: 2024-03-21

## 2024-02-26 RX ORDER — HYDROCHLOROTHIAZIDE 25 MG/1
25 TABLET ORAL DAILY
Qty: 90 TABLET | Refills: 0 | Status: SHIPPED | OUTPATIENT
Start: 2024-02-26 | End: 2024-05-28 | Stop reason: SDUPTHER

## 2024-02-26 RX ORDER — PRAVASTATIN SODIUM 10 MG/1
10 TABLET ORAL DAILY
Qty: 90 TABLET | Refills: 0 | Status: SHIPPED | OUTPATIENT
Start: 2024-02-26 | End: 2024-05-28 | Stop reason: SDUPTHER

## 2024-02-26 RX ORDER — FENOFIBRATE 145 MG/1
145 TABLET, FILM COATED ORAL DAILY
Qty: 90 TABLET | Refills: 0 | Status: SHIPPED | OUTPATIENT
Start: 2024-02-26 | End: 2024-05-28 | Stop reason: SDUPTHER

## 2024-02-26 NOTE — TELEPHONE ENCOUNTER
----- Message from Everardo Dumont MA sent at 2/26/2024 10:43 AM CST -----  Type: Patient Call Back    Who called: Self    What is the request in detail: is following up on a call regarding refills on her medications ..    Can the clinic reply by [facility]?No    Would the patient rather a call back or a response via [facility]? Yes, call     Best call back number: [phone] (home)

## 2024-03-21 DIAGNOSIS — N18.30 BENIGN HYPERTENSION WITH CHRONIC KIDNEY DISEASE, STAGE III: ICD-10-CM

## 2024-03-21 DIAGNOSIS — I12.9 BENIGN HYPERTENSION WITH CHRONIC KIDNEY DISEASE, STAGE III: ICD-10-CM

## 2024-03-21 DIAGNOSIS — K21.9 GASTROESOPHAGEAL REFLUX DISEASE WITHOUT ESOPHAGITIS: ICD-10-CM

## 2024-03-21 RX ORDER — LISINOPRIL 40 MG/1
40 TABLET ORAL EVERY MORNING
Qty: 90 TABLET | Refills: 0 | Status: SHIPPED | OUTPATIENT
Start: 2024-03-21 | End: 2024-05-28 | Stop reason: SDUPTHER

## 2024-03-21 RX ORDER — OMEPRAZOLE 40 MG/1
CAPSULE, DELAYED RELEASE ORAL
Qty: 90 CAPSULE | Refills: 0 | Status: SHIPPED | OUTPATIENT
Start: 2024-03-21 | End: 2024-05-28 | Stop reason: SDUPTHER

## 2024-03-21 NOTE — TELEPHONE ENCOUNTER
Care Due:                  Date            Visit Type   Department     Provider  --------------------------------------------------------------------------------                                [facility]                              PRIMARY      MED/ INTERNAL  [facility]  Last Visit: 03-      CARE ([facility])   MED/ PEDS      Josekelgermán Auguste                              [facility]/ INTERNAL  Laurentia Kailyn  Next Visit: 10-      CARE ([facility])   MED/ PEDS      Josekeler  Molina                                                            Last  Test          Frequency    Reason                     Performed    Due Date  --------------------------------------------------------------------------------    Office Visit  15 months..  fenofibrate,               03- 06-                             hydroCHLOROthiazide,                             lisinopriL, omeprazole,                             pravastatin..............    CBC.........  12 months..  fenofibrate..............  03- 03-    CMP.........  12 months..  fenofibrate,               03- 03-                             hydroCHLOROthiazide,                             lisinopriL, pravastatin..    Lipid Panel.  12 months..  fenofibrate, pravastatin.  03- 03-    [facility] Embedded Care Due Messages. Reference number: [id number].   3/21/2024 9:31:27 AM CDT

## 2024-03-21 NOTE — TELEPHONE ENCOUNTER
Refill Routing Note   Medication(s) are not appropriate for processing by [facility] for the following reason(s):        Outside of protocol    [facility] action(s):  Route      Medication Therapy Plan: PRN USAGE(OP)      Appointments  past 12m or future 3m with PCP    Date Provider   Last Visit   3/17/2023 Lulu Auguste MD   Next Visit   Visit date not found Lulu Auguste MD   ED visits in past 90 days: 0        Note composed:9:34 AM 03/21/2024

## 2024-03-21 NOTE — TELEPHONE ENCOUNTER
No care due was identified.  [facility] Embedded Care Due Messages. Reference number: [id number].   3/21/2024 9:32:05 AM CDT

## 2024-03-21 NOTE — TELEPHONE ENCOUNTER
Refill Routing Note   Medication(s) are not appropriate for processing by [facility] for the following reason(s):        Required labs outdated    [facility] action(s):  Defer   Requires appointment : Yes   Requires labs : Yes             Appointments  past 12m or future 3m with PCP    Date Provider   Last Visit   3/17/2023 Lulu Auguste MD   Next Visit   3/21/2024 Lulu Auguste MD   ED visits in past 90 days: 0        Note composed:9:36 AM 03/21/2024

## 2024-03-22 ENCOUNTER — OFFICE VISIT (OUTPATIENT)
Dept: FAMILY MEDICINE | Facility: CLINIC | Age: 79
End: 2024-03-22
Payer: MEDICARE

## 2024-03-22 ENCOUNTER — LAB VISIT (OUTPATIENT)
Dept: LAB | Facility: HOSPITAL | Age: 79
End: 2024-03-22
Payer: MEDICARE

## 2024-03-22 VITALS
HEART RATE: 66 BPM | WEIGHT: 186.63 LBS | OXYGEN SATURATION: 96 % | BODY MASS INDEX: 35.23 KG/M2 | DIASTOLIC BLOOD PRESSURE: 76 MMHG | TEMPERATURE: 97 F | HEIGHT: 61 IN | SYSTOLIC BLOOD PRESSURE: 130 MMHG

## 2024-03-22 DIAGNOSIS — E66.01 SEVERE OBESITY (BMI 35.0-39.9) WITH COMORBIDITY: ICD-10-CM

## 2024-03-22 DIAGNOSIS — N18.31 STAGE 3A CHRONIC KIDNEY DISEASE: ICD-10-CM

## 2024-03-22 DIAGNOSIS — R73.03 PREDIABETES: ICD-10-CM

## 2024-03-22 DIAGNOSIS — R79.9 ABNORMAL FINDING OF BLOOD CHEMISTRY, UNSPECIFIED: ICD-10-CM

## 2024-03-22 DIAGNOSIS — I70.0 ATHEROSCLEROSIS OF AORTA: ICD-10-CM

## 2024-03-22 DIAGNOSIS — Z00.00 ANNUAL PHYSICAL EXAM: Primary | ICD-10-CM

## 2024-03-22 LAB
CHOLEST SERPL-MCNC: 134 MG/DL (ref 120–199)
CHOLEST/HDLC SERPL: 2.7 {RATIO} (ref 2–5)
ESTIMATED AVG GLUCOSE: 108 MG/DL (ref 68–131)
HBA1C MFR BLD: 5.4 % (ref 4–5.6)
HDLC SERPL-MCNC: 50 MG/DL (ref 40–75)
HDLC SERPL: 37.3 % (ref 20–50)
LDLC SERPL CALC-MCNC: 68.2 MG/DL (ref 63–159)
NONHDLC SERPL-MCNC: 84 MG/DL
TRIGL SERPL-MCNC: 79 MG/DL (ref 30–150)

## 2024-03-22 PROCEDURE — 99213 OFFICE O/P EST LOW 20 MIN: CPT | Mod: PBBFAC,PO

## 2024-03-22 PROCEDURE — 80061 LIPID PANEL: CPT

## 2024-03-22 PROCEDURE — 36415 COLL VENOUS BLD VENIPUNCTURE: CPT | Mod: PO

## 2024-03-22 PROCEDURE — 99999 PR PBB SHADOW E&M-EST. PATIENT-LVL III: CPT | Mod: PBBFAC,,,

## 2024-03-22 PROCEDURE — 83036 HEMOGLOBIN GLYCOSYLATED A1C: CPT

## 2024-03-22 PROCEDURE — 99397 PER PM REEVAL EST PAT 65+ YR: CPT | Mod: S$PBB,GZ,,

## 2024-03-22 NOTE — PROGRESS NOTES
HPI     Chief Complaint:  Chief Complaint   Patient presents with    Annual Exam       Francoise De Jesus is a 78 y.o. female with multiple medical diagnoses as listed in the medical history and problem list that presents for annual exam.  Pt is not known to me with her last appointment Visit date not found.      HPI  Pt presents for annual exam. Doing well today, no complaints.  Has been focusing on healthy diet and has cut out soft drinks. Has increased physical activity with gardening.      Assessment & Plan     Problem List Items Addressed This Visit          Cardiac/Vascular    Atherosclerosis of aorta  Stable. Currently on pravastatin. The current medical regimen is effective;  continue present plan and medications.      Overview     noted on CXR 11/17/2014            Renal/    Stage 3a chronic kidney disease  Stable. The current medical regimen is effective;  continue present plan and medications.         Endocrine    Prediabetes  Due for A1C today. Will order hemoglobin A1C.  Hemoglobin A1C   Date Value Ref Range Status   03/13/2023 5.8 (H) 4.0 - 5.6 % Final     Comment:     ADA Screening Guidelines:  5.7-6.4%  Consistent with prediabetes  >or=6.5%  Consistent with diabetes    High levels of fetal hemoglobin interfere with the HbA1C  assay. Heterozygous hemoglobin variants (HbS, HgC, etc)do  not significantly interfere with this assay.   However, presence of multiple variants may affect accuracy.     02/10/2022 5.7 (H) 4.0 - 5.6 % Final     Comment:     ADA Screening Guidelines:  5.7-6.4%  Consistent with prediabetes  >or=6.5%  Consistent with diabetes    High levels of fetal hemoglobin interfere with the HbA1C  assay. Heterozygous hemoglobin variants (HbS, HgC, etc)do  not significantly interfere with this assay.   However, presence of multiple variants may affect accuracy.     01/16/2021 6.2 (H) 4.0 - 5.6 % Final     Comment:     ADA Screening Guidelines:  5.7-6.4%  Consistent with prediabetes  >or=6.5%   Consistent with diabetes  High levels of fetal hemoglobin interfere with the HbA1C  assay. Heterozygous hemoglobin variants (HbS, HgC, etc)do  not significantly interfere with this assay.   However, presence of multiple variants may affect accuracy.           Relevant Orders    Lipid Panel    Hemoglobin A1C    Severe obesity (BMI 35.0-39.9) with comorbidity  Continue healthy diet and exercise for weight loss.       Other Visit Diagnoses       Annual physical exam    -  Primary  Counseled on age appropriate medical preventative services including age appropriate cancer screenings, age appropriate eye and dental exams, over all nutritional health, need for a consistent exercise regimen, and an over all push towards maintaining a vigorous and active lifestyle.  Counseled on age appropriate vaccines and discussed upcoming health care needs based on age/gender. Discussed good sleep hygiene and stress management.      Abnormal finding of blood chemistry, unspecified      Stable. Currently on pravastatin. The current medical regimen is effective;  continue present plan and medications.    Relevant Orders    Lipid Panel              --------------------------------------------      Health Maintenance:  Health Maintenance         Date Due Completion Date    RSV Vaccine (Age 60+ and Pregnant patients) (1 - 1-dose 60+ series) Never done ---    COVID-19 Vaccine (4 - 2023-24 season) 09/01/2023 10/23/2021    Lipid Panel 03/13/2024 3/13/2023    Hemoglobin A1c 03/13/2024 3/13/2023    TETANUS VACCINE 03/22/2025 (Originally 8/28/1963) ---    Shingles Vaccine (2 of 2) 03/22/2025 (Originally 9/8/2021) 7/14/2021    Mammogram 03/22/2025 (Originally 3/11/2022) 3/11/2021            Health maintenance reviewed, Lipid panel ordered, and A1c ordered    Follow Up:  No follow-ups on file.    Exam     Review of Systems:  (as noted above)  Review of Systems    Physical Exam:   Physical Exam  Constitutional:       General: She is not in acute  "distress.     Appearance: She is obese. She is not ill-appearing or diaphoretic.   HENT:      Head: Normocephalic and atraumatic.   Cardiovascular:      Rate and Rhythm: Normal rate and regular rhythm.      Heart sounds: No murmur heard.     No friction rub. No gallop.   Pulmonary:      Effort: No respiratory distress.   Chest:      Chest wall: No tenderness.   Musculoskeletal:      Cervical back: No rigidity.   Neurological:      Mental Status: She is alert and oriented to person, place, and time.       Vitals:    03/22/24 1057   BP: 130/76   Pulse: 66   Temp: 97.3 °F (36.3 °C)   TempSrc: Oral   SpO2: 96%   Weight: 84.6 kg (186 lb 9.9 oz)   Height: 5' 1" (1.549 m)      Body mass index is 35.26 kg/m².        History     Past Medical History:  Past Medical History:   Diagnosis Date    Arthritis     Atherosclerosis of aorta     noted on CXR 11/17/2014    Breast cancer 2011    stage I right breast cancer    Disorder of kidney and ureter     History of peptic ulcer     Hyperlipidemia     Hypertension     Macular degeneration 3/13/2017    Morbid obesity     Prediabetes        Past Surgical History:  Past Surgical History:   Procedure Laterality Date    BREAST BIOPSY Left     BREAST LUMPECTOMY Right 11/2011    lumpectomy and SN biopsy with radiation    CATARACT EXTRACTION W/  INTRAOCULAR LENS IMPLANT Left 07/22/2017    Dr. Okeefe    CATARACT EXTRACTION W/  INTRAOCULAR LENS IMPLANT Right 07/06/2017    Dr. Okeefe    CHOLECYSTECTOMY      EYE SURGERY      Lt cataract       Social History:  Social History     Socioeconomic History    Marital status:     Number of children: 2   Occupational History    Occupation:    Tobacco Use    Smoking status: Never    Smokeless tobacco: Never   Substance and Sexual Activity    Alcohol use: No    Drug use: No    Sexual activity: Not Currently     Partners: Male     Social Determinants of Health     Financial Resource Strain: Low Risk  (9/29/2023)    Overall Financial " Resource Strain (CARDIA)     Difficulty of Paying Living Expenses: Not hard at all   Food Insecurity: No Food Insecurity (9/29/2023)    Hunger Vital Sign     Worried About Running Out of Food in the Last Year: Never true     Ran Out of Food in the Last Year: Never true   Transportation Needs: No Transportation Needs (9/29/2023)    PRAPARE - Transportation     Lack of Transportation (Medical): No     Lack of Transportation (Non-Medical): No   Physical Activity: Insufficiently Active (9/29/2023)    Exercise Vital Sign     Days of Exercise per Week: 2 days     Minutes of Exercise per Session: 10 min   Stress: No Stress Concern Present (9/29/2023)    [employer] [place] [employer] - Occupational Stress Questionnaire     Feeling of Stress : Only a little   Social Connections: Socially Isolated (9/29/2023)    Social Connection and Isolation Panel [NHANES]     Frequency of Communication with Friends and Family: More than three times a week     Frequency of Social Gatherings with Friends and Family: Once a week     Attends Yazidism Services: Never     Active Member of Clubs or Organizations: No     Attends Club or Organization Meetings: Never     Marital Status:    Housing Stability: Low Risk  (9/29/2023)    Housing Stability Vital Sign     Unable to Pay for Housing in the Last Year: No     Number of Places Lived in the Last Year: 1     Unstable Housing in the Last Year: No       Family History:  Family History   Problem Relation Age of Onset    Stomach cancer Mother     Lung cancer Father     Hypertension Father     Breast cancer Sister 58    Cataracts Sister     Macular degeneration Sister     Cancer Sister         breast CA    Cataracts Sister     Macular degeneration Sister     Breast cancer Sister 72    Cancer Sister         breast CA     Diabetes Sister     Hypertension Sister     Macular degeneration Sister     Macular degeneration Sister     Glaucoma Brother     Macular degeneration Brother          wet    Hypertension Brother     Macular degeneration Brother         dry    Hypertension Brother     Hypertension Brother     Diabetes Brother     Hypertension Brother     Prostate cancer Brother     Cancer Brother 80        prostate     Hypertension Brother     Hyperlipidemia Brother     Leukemia Brother     No Known Problems Son     No Known Problems Son     Ovarian cancer Neg Hx     Amblyopia Neg Hx     Blindness Neg Hx     Retinal detachment Neg Hx     Strabismus Neg Hx     Stroke Neg Hx     Thyroid disease Neg Hx        Allergies and Medications: (updated and reviewed)  Review of patient's allergies indicates:   Allergen Reactions    Medrol [methylprednisolone]      Depression and tearful    Penicillins Rash     Current Outpatient Medications   Medication Sig Dispense Refill    acetaminophen (TYLENOL) 500 MG tablet Take 500 mg by mouth every 6 (six) hours as needed for Pain.      ANTIOX#10/OM3/DHA/EPA/LUT/ZEAX (I-CAPS ORAL) Take 1 tablet by mouth 2 (two) times a day.      aspirin (ECOTRIN) 81 MG EC tablet Take 1 tablet (81 mg total) by mouth once daily.  0    fenofibrate (TRICOR) 145 MG tablet Take 1 tablet (145 mg total) by mouth once daily. 1 Tablet Oral Every evening 90 tablet 0    hydroCHLOROthiazide (HYDRODIURIL) 25 MG tablet Take 1 tablet (25 mg total) by mouth once daily. 1 Tablet Oral Every morning 90 tablet 0    lisinopriL (PRINIVIL,ZESTRIL) 40 MG tablet TAKE ONE TABLET BY MOUTH EVERY DAY IN THE MORNING 90 tablet 0    omeprazole (PRILOSEC) 40 MG capsule TAKE ONE CAPSULE BY MOUTH EVERY DAY AS NEEDED FOR HEARTBURN 90 capsule 0    pravastatin (PRAVACHOL) 10 MG tablet Take 1 tablet (10 mg total) by mouth once daily. 90 tablet 0     No current facility-administered medications for this visit.       Patient Care Team:  Lulu Auguste MD as PCP - General (Internal Medicine)  Kyler Hickman MD as Consulting Physician (Hematology and Oncology)  Wang Agarwal OD as Consulting Physician  (Optometry)  Kyler Hickman MD as Consulting Physician (Hematology and Oncology)  Enrique Potter MD as Consulting Physician (Ophthalmology)         - The patient is given an After Visit Summary that lists all medications with directions, allergies, education, orders placed during this encounter and follow-up instructions.      - I have reviewed the patient's medical information including past medical, family, and social history sections including the medications and allergies.      - We discussed the patient's current medications.     This note was created by combination of typed  and MModal dictation.  Transcription errors may be present.  If there are any questions, please contact me.       Dominique Light NP

## 2024-03-26 ENCOUNTER — PROCEDURE VISIT (OUTPATIENT)
Dept: OPHTHALMOLOGY | Facility: CLINIC | Age: 79
End: 2024-03-26
Attending: OPHTHALMOLOGY
Payer: MEDICARE

## 2024-03-26 DIAGNOSIS — H35.3231 EXUDATIVE AGE-RELATED MACULAR DEGENERATION OF BOTH EYES WITH ACTIVE CHOROIDAL NEOVASCULARIZATION: ICD-10-CM

## 2024-03-26 DIAGNOSIS — H35.3134 ADVANCED ATROPHIC NONEXUDATIVE AGE-RELATED MACULAR DEGENERATION OF BOTH EYES WITH SUBFOVEAL INVOLVEMENT: Primary | ICD-10-CM

## 2024-03-26 PROCEDURE — 92134 CPTRZ OPH DX IMG PST SGM RTA: CPT | Mod: PBBFAC,PO | Performed by: OPHTHALMOLOGY

## 2024-03-26 PROCEDURE — 67028 INJECTION EYE DRUG: CPT | Mod: S$PBB,LT,, | Performed by: OPHTHALMOLOGY

## 2024-03-26 PROCEDURE — 67028 INJECTION EYE DRUG: CPT | Mod: PBBFAC,PO,LT | Performed by: OPHTHALMOLOGY

## 2024-03-26 PROCEDURE — 99999PBSHW PR PBB SHADOW TECHNICAL ONLY FILED TO HB: Mod: JZ,PBBFAC,,

## 2024-03-26 PROCEDURE — 99499 UNLISTED E&M SERVICE: CPT | Mod: S$PBB,,, | Performed by: OPHTHALMOLOGY

## 2024-03-26 RX ADMIN — PEGCETACOPLAN 15 MG: 15 INJECTION, SOLUTION INTRAVITREAL at 09:03

## 2024-03-26 NOTE — PROGRESS NOTES
Subjective:       Patient ID: Francoise De Jesus is a 78 y.o. female      Chief Complaint   Patient presents with    Injections     History of Present Illness  HPI    8 wk OCT Syfovre   DLS- 01/30/2024 Dr. Potter     Pt sts vision stable. Feels she's doing well  Denies any eye pain   (-)Flashes (-)Floaters  (-)Photophobia  (--)Glare    Refresh PRN   Last edited by Enrique Potter MD on 3/26/2024  5:13 PM.        Imaging:    See report    Assessment/Plan:     1. 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  Discussed possible progression with or without Rx but could poss slow down with Syfovre inj  RBA discussed inc IOI, ION, endophth, wet AMD.   Pt wishes to have injections OS  Start today     - Prior authorization Order      Follow up in about 3 weeks (around 4/16/2024), or if symptoms worsen or fail to improve, for Dilated examination, OCT Mac, Injection Right eye, Injection Left eye, Avastin.     Patient identified.  Timeout performed.    Risks, benefits, and alternatives to treatment were discussed in detail with the patient, including bleeding, infection (endophthalmitis), NAION, wet AMD, etc.  The patient voiced understanding and wished to proceed with the procedure.  See separate consent form.    Injection Procedure Note:  Diagnosis: Advanced dry AMD with geographic atrophy Left Eye    Topical Proparacaine drop placed then topical 5% Betadine  Sterile gloves used, and sterile lid speculum placed.  5% Betadine placed at tap and injection site again   Syfovre 15mg in 0.1cc Injected inferotemporally 3.5-4mm posterior to the limbus.  Complications: None  Va at least CF at 5 feet post injection.  Retina, ONH, IOP normal after injection.    Followup as above.  Patient should return immediately PRN.  Retinal Detachment and Endophthalmitis precautions given.

## 2024-04-17 ENCOUNTER — PROCEDURE VISIT (OUTPATIENT)
Dept: OPHTHALMOLOGY | Facility: CLINIC | Age: 79
End: 2024-04-17
Attending: OPHTHALMOLOGY
Payer: MEDICARE

## 2024-04-17 DIAGNOSIS — H35.3134 ADVANCED ATROPHIC NONEXUDATIVE AGE-RELATED MACULAR DEGENERATION OF BOTH EYES WITH SUBFOVEAL INVOLVEMENT: ICD-10-CM

## 2024-04-17 DIAGNOSIS — H35.3231 EXUDATIVE AGE-RELATED MACULAR DEGENERATION OF BOTH EYES WITH ACTIVE CHOROIDAL NEOVASCULARIZATION: Primary | ICD-10-CM

## 2024-04-17 PROCEDURE — 67028 INJECTION EYE DRUG: CPT | Mod: 50,PBBFAC,PO | Performed by: OPHTHALMOLOGY

## 2024-04-17 PROCEDURE — 99214 OFFICE O/P EST MOD 30 MIN: CPT | Mod: 25,S$PBB,, | Performed by: OPHTHALMOLOGY

## 2024-04-17 PROCEDURE — 92134 CPTRZ OPH DX IMG PST SGM RTA: CPT | Mod: PBBFAC,PO | Performed by: OPHTHALMOLOGY

## 2024-04-17 PROCEDURE — 67028 INJECTION EYE DRUG: CPT | Mod: 50,S$PBB,, | Performed by: OPHTHALMOLOGY

## 2024-04-17 PROCEDURE — 99999PBSHW PR PBB SHADOW TECHNICAL ONLY FILED TO HB: Mod: JZ,PBBFAC,,

## 2024-04-17 RX ADMIN — Medication 1.25 MG: at 02:04

## 2024-04-17 NOTE — PROGRESS NOTES
Subjective:       Patient ID: Francoise De Jesus is a 78 y.o. female      No chief complaint on file.    History of Present Illness  HPI     3 wk  DFE/OCTm and Syfovre OU  DLS: 03/26/2024 by Dr. ARIELLE Potter MD    CC: pt reports : I am seeing better since last visit. I can see the   television better.     No blurred VA  No eye pain   No diplopia  No flashes //floaters   No headaches  No curtain/shadows/veils    Eye med:   ATs PRN OU  Preservision     POHx:   1. Exudative ARMD  OD w/ Active Choroidal NVO  S/P Avastin OD (02/16/2022)  S/P Avastin OS ( 03/23/2022)  S/P Avastin OU (05/04/2022) (07/06/2022) ( 09/28/2022) (01/11/2023)    S/P Syfovre OS ( 03/26/2024)  Last edited by Enrique Potter MD on 4/17/2024  2:07 PM.        Imaging:    See report    Assessment/Plan:     1. Exudative age-related macular degeneration of both eyes with active choroidal neovascularization  Controlled OU today with q 12 wks wks s/p Av OU    IOP good, ONH appears stable OU    Avas OU today and q 12 wks  Due for inj today  RBA to inj discussed again    - Prior authorization Order  - Posterior Segment OCT Retina-Both eyes    1. Advanced atrophic nonexudative age-related macular degeneration of both eyes with subfoveal involvement  Tolerating Syfovre well  Rec CPM q 8 wks  return in 5 wk for Sy OS    Prev discussed preserved Va OS.   Vision has tested better OD in last few visits.  Need to ensure that not unintentionally seeing eye chart with OS at upcoming visits    - Prior authorization Order      Follow up in about 5 weeks (around 5/22/2024), or if symptoms worsen or fail to improve, for OCT and INJECTION ONLY, Injection Left eye, Syfovre.     Patient identified.  Timeout performed.    Risks, benefits, and alternatives to treatment were discussed in detail with the patient, including bleeding/infection (endophthalmitis)/etc.  The patient voiced understanding and wished to proceed with the procedure.  See separate consent  form.    Injection Procedure Note:  Diagnosis: Wet AMD Right Eye    Topical Proparacaine drop placed then topical 5% Betadine  Sterile gloves used, and sterile lid speculum placed.  5% Betadine placed at injection site again prior to injection.  Avastin 1.25mg in 0.05cc Injected inferotemporally 3.5-4mm posterior to the limbus.  Complications: None  Va at least CF at 5 feet post injection.  Retina, ONH, IOP normal after injection.    Patient identified.  Timeout performed.    Risks, benefits, and alternatives to treatment were discussed in detail with the patient, including bleeding/infection (endophthalmitis)/etc.  The patient voiced understanding and wished to proceed with the procedure.  See separate consent form.    Injection Procedure Note:  Diagnosis: Wet AMD Left Eye    Topical Proparacaine drop placed then topical 5% Betadine  Sterile gloves used, and sterile lid speculum placed.  5% Betadine placed at injection site again prior to injection.  Avastin 1.25mg in 0.05cc Injected inferotemporally 3.5-4mm posterior to the limbus.  Complications: None  Va at least CF at 5 feet post injection.  Retina, ONH, IOP normal after injection.    Followup as above.  Patient should return immediately PRN.  Retinal Detachment and Endophthalmitis precautions given.

## 2024-05-22 ENCOUNTER — PROCEDURE VISIT (OUTPATIENT)
Dept: OPHTHALMOLOGY | Facility: CLINIC | Age: 79
End: 2024-05-22
Attending: OPHTHALMOLOGY
Payer: MEDICARE

## 2024-05-22 DIAGNOSIS — H35.3231 EXUDATIVE AGE-RELATED MACULAR DEGENERATION OF BOTH EYES WITH ACTIVE CHOROIDAL NEOVASCULARIZATION: Primary | ICD-10-CM

## 2024-05-22 PROCEDURE — 67028 INJECTION EYE DRUG: CPT | Mod: S$PBB,LT,, | Performed by: OPHTHALMOLOGY

## 2024-05-22 PROCEDURE — 67028 INJECTION EYE DRUG: CPT | Mod: PBBFAC,PO,LT | Performed by: OPHTHALMOLOGY

## 2024-05-22 PROCEDURE — 92134 CPTRZ OPH DX IMG PST SGM RTA: CPT | Mod: PBBFAC,PO | Performed by: OPHTHALMOLOGY

## 2024-05-22 PROCEDURE — 99499 UNLISTED E&M SERVICE: CPT | Mod: S$PBB,,, | Performed by: OPHTHALMOLOGY

## 2024-05-22 PROCEDURE — 99999PBSHW PR PBB SHADOW TECHNICAL ONLY FILED TO HB: Mod: JZ,PBBFAC,,

## 2024-05-22 RX ADMIN — PEGCETACOPLAN 15 MG: 15 INJECTION, SOLUTION INTRAVITREAL at 02:05

## 2024-05-22 NOTE — PROGRESS NOTES
Subjective:       Patient ID: Francoise De Jesus is a 78 y.o. female      Chief Complaint   Patient presents with    Follow-up     History of Present Illness  HPI    5 wk OCT/Sy OS only    Pt states va is stable since last visit.  No new symptoms or concerns   today. Happy with current vision    No flashes  No floaters  No pain  Gtts:AT's PRN OU    POHx:  1. Exudative age-related macular degeneration of both eyes with active   choroidal neovascularization  2. Advanced atrophic nonexudative age-related macular degeneration of both   eyes with subfoveal involvement      Last edited by Enrique Potter MD on 5/22/2024  2:09 PM.        Imaging:    See report    Assessment/Plan:     1. Exudative age-related macular degeneration of both eyes with active choroidal neovascularization  Controlled OU today with q 12 wks wks s/p Av OU    IOP good, ONH appears stable OU      Due for inj in 7 wks    - Prior authorization Order  - Posterior Segment OCT Retina-Both eyes    1. 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   Vision has tested better OD in last few visits.  Today MD confirmed Va is fairly good OD.  Can consider future Syfovre OD as well    - Prior authorization Order      Follow up in about 7 weeks (around 7/10/2024) for Injection Left eye, Injection Right eye, Avastin, Dilated examination, OCT Mac.     Patient identified.  Timeout performed.    Risks, benefits, and alternatives to treatment were discussed in detail with the patient, including bleeding, infection (endophthalmitis), NAION, wet AMD, etc.  The patient voiced understanding and wished to proceed with the procedure.  See separate consent form.    Injection Procedure Note:  Diagnosis: Advanced dry AMD with geographic atrophy Left Eye    Topical Proparacaine drop placed then topical 5% Betadine  Sterile gloves used,  and sterile lid speculum placed.  5% Betadine placed at tap and injection site again   Syfovre 15mg in 0.1cc Injected inferotemporally 3.5-4mm posterior to the limbus.  Complications: None  Va at least CF at 5 feet post injection.  Retina, ONH, IOP normal after injection.    Followup as above.  Patient should return immediately PRN.  Retinal Detachment and Endophthalmitis precautions given.

## 2024-05-28 DIAGNOSIS — I12.9 BENIGN HYPERTENSION WITH CHRONIC KIDNEY DISEASE, STAGE III: ICD-10-CM

## 2024-05-28 DIAGNOSIS — K21.9 GASTROESOPHAGEAL REFLUX DISEASE WITHOUT ESOPHAGITIS: ICD-10-CM

## 2024-05-28 DIAGNOSIS — N18.30 BENIGN HYPERTENSION WITH CHRONIC KIDNEY DISEASE, STAGE III: ICD-10-CM

## 2024-05-28 DIAGNOSIS — E78.49 OTHER HYPERLIPIDEMIA: ICD-10-CM

## 2024-05-28 RX ORDER — FENOFIBRATE 145 MG/1
145 TABLET, FILM COATED ORAL DAILY
Qty: 90 TABLET | Refills: 0 | Status: SHIPPED | OUTPATIENT
Start: 2024-05-28

## 2024-05-28 RX ORDER — PRAVASTATIN SODIUM 10 MG/1
10 TABLET ORAL DAILY
Qty: 90 TABLET | Refills: 0 | Status: SHIPPED | OUTPATIENT
Start: 2024-05-28

## 2024-05-28 RX ORDER — HYDROCHLOROTHIAZIDE 25 MG/1
25 TABLET ORAL DAILY
Qty: 90 TABLET | Refills: 0 | Status: SHIPPED | OUTPATIENT
Start: 2024-05-28

## 2024-05-28 RX ORDER — OMEPRAZOLE 40 MG/1
CAPSULE, DELAYED RELEASE ORAL
Qty: 90 CAPSULE | Refills: 0 | Status: SHIPPED | OUTPATIENT
Start: 2024-05-28

## 2024-05-28 RX ORDER — LISINOPRIL 40 MG/1
40 TABLET ORAL EVERY MORNING
Qty: 90 TABLET | Refills: 0 | Status: SHIPPED | OUTPATIENT
Start: 2024-05-28

## 2024-05-28 NOTE — TELEPHONE ENCOUNTER
Care Due:                  Date            Visit Type   Department     Provider  --------------------------------------------------------------------------------                                [facility]      MED/ INTERNAL  [facility]  Last Visit: 03-      CARE ([facility])   MED/ PEDS      Josekelgermán Auguste                              [facility]/ INTERNAL  [facility]  Next Visit: 10-      CARE ([facility])   MED/ PEDS      Josekeler  Molina                                                            Last  Test          Frequency    Reason                     Performed    Due Date  --------------------------------------------------------------------------------    Office Visit  15 months..  fenofibrate,               03- 06-                             hydroCHLOROthiazide,                             lisinopriL, omeprazole,                             pravastatin..............    CBC.........  12 months..  fenofibrate..............  03- 03-    CMP.........  12 months..  fenofibrate,               03- 03-                             hydroCHLOROthiazide,                             lisinopriL, pravastatin..    [facility] Embedded Care Due Messages. Reference number: [id number].   5/28/2024 2:44:34 PM CDT

## 2024-05-28 NOTE — TELEPHONE ENCOUNTER
----- Message from Dinatiny Mcrae sent at 5/28/2024 12:10 PM CDT -----  Regarding: Self [phone]  Type: RX Refill Request     Who Called: Self     Have you contacted your pharmacy: No     Refill or New Rx: Refill     RX Name and Strength:   lisinopriL (PRINIVIL,ZESTRIL) 40 MG tablet  pravastatin (PRAVACHOL) 10 MG tablet  fenofibrate (TRICOR) 145 MG tablet  omeprazole (PRILOSEC) 40 MG capsule  hydroCHLOROthiazide (HYDRODIURIL) 25 MG tablet    How is the patient currently taking it? (ex. 1XDay):     Is this a 30 day or 90 day RX:     Preferred Pharmacy with phone number:   [facility]  [address]  [address]  [place] [address]  Phone: [phone] Fax: [phone]    Local or Mail Order:     Ordering Provider:     Would the patient rather a call back or a response via [facility]? Call     Best Call Back Number: [phone]       Additional Information: Pt has an appt scheduled 2p 10/23/24 along with appt on the waiting list. Pt is requesting a refill. Pt states the meds does not have refills and she will need all of her meds refilled prior to schedule appt. Pt states she will be out of all of her med by the end of June.

## 2024-07-09 ENCOUNTER — PROCEDURE VISIT (OUTPATIENT)
Dept: OPHTHALMOLOGY | Facility: CLINIC | Age: 79
End: 2024-07-09
Attending: OPHTHALMOLOGY
Payer: MEDICARE

## 2024-07-09 DIAGNOSIS — H35.3231 EXUDATIVE AGE-RELATED MACULAR DEGENERATION OF BOTH EYES WITH ACTIVE CHOROIDAL NEOVASCULARIZATION: Primary | ICD-10-CM

## 2024-07-09 PROCEDURE — 99999PBSHW PR PBB SHADOW TECHNICAL ONLY FILED TO HB: Mod: JZ,PBBFAC,,

## 2024-07-09 PROCEDURE — 67028 INJECTION EYE DRUG: CPT | Mod: 50,S$PBB,, | Performed by: OPHTHALMOLOGY

## 2024-07-09 PROCEDURE — 67028 INJECTION EYE DRUG: CPT | Mod: 50,PBBFAC,PO | Performed by: OPHTHALMOLOGY

## 2024-07-09 PROCEDURE — 99214 OFFICE O/P EST MOD 30 MIN: CPT | Mod: 25,S$PBB,, | Performed by: OPHTHALMOLOGY

## 2024-07-09 PROCEDURE — 92134 CPTRZ OPH DX IMG PST SGM RTA: CPT | Mod: PBBFAC,PO | Performed by: OPHTHALMOLOGY

## 2024-07-09 RX ADMIN — Medication 1.25 MG: at 02:07

## 2024-07-09 NOTE — PROGRESS NOTES
Subjective:       Patient ID: Francoise De Jesus is a 78 y.o. female      Chief Complaint   Patient presents with    Follow-up     History of Present Illness  HPI    12 week Avastin OU     Dls: 5/22/24 Dr. Potter     Pt states no changes.  Pt feels that vision is good OU.     No pain     No flashes  No floaters  No pain    Eye meds  Refresh OU PRN     POHx:  1. Exudative age-related macular degeneration of both eyes with active   choroidal neovascularization  2. Advanced atrophic nonexudative age-related macular degeneration of both   eyes with subfoveal involvement      Last edited by Enrique Potter MD on 7/9/2024  2:21 PM.        Imaging:    See report    Assessment/Plan:     1. Exudative age-related macular degeneration of both eyes with active choroidal neovascularization  Controlled OU today with q 12 wks wks s/p Av OU    IOP good, ONH appears stable OU  Avas OU today and q 12 wks    - Prior authorization Order  - Posterior Segment OCT Retina-Both eyes    1. 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  Va OD is much better than was measuring when decided on Syf OS only.  Pt declines for now, would like to limit injections and will reconsider in future.   Will follow with [facility]    - Prior authorization Order      Follow up in about 1 week (around 7/16/2024), or if symptoms worsen or fail to improve, for Fundus Autofluoresence, Injection Left eye, Syfovre.     Patient identified.  Timeout performed.    Risks, benefits, and alternatives to treatment were discussed in detail with the patient, including bleeding/infection (endophthalmitis)/etc.  The patient voiced understanding and wished to proceed with the procedure.  See separate consent form.    Injection Procedure Note:  Diagnosis: Wet AMD Right Eye    Topical Proparacaine drop placed then topical 5%  Betadine  Sterile gloves used, and sterile lid speculum placed.  5% Betadine placed at injection site again prior to injection.  Avastin 1.25mg in 0.05cc Injected inferotemporally 3.5-4mm posterior to the limbus.  Complications: None  Va at least CF at 5 feet post injection.  Retina, ONH, IOP normal after injection.    Patient identified.  Timeout performed.    Risks, benefits, and alternatives to treatment were discussed in detail with the patient, including bleeding/infection (endophthalmitis)/etc.  The patient voiced understanding and wished to proceed with the procedure.  See separate consent form.    Injection Procedure Note:  Diagnosis: Wet AMD Left Eye    Topical Proparacaine drop placed then topical 5% Betadine  Sterile gloves used, and sterile lid speculum placed.  5% Betadine placed at injection site again prior to injection.  Avastin 1.25mg in 0.05cc Injected inferotemporally 3.5-4mm posterior to the limbus.  Complications: None  Va at least CF at 5 feet post injection.  Retina, ONH, IOP normal after injection.    Followup as above.  Patient should return immediately PRN.  Retinal Detachment and Endophthalmitis precautions given.

## 2024-07-17 ENCOUNTER — PROCEDURE VISIT (OUTPATIENT)
Dept: OPHTHALMOLOGY | Facility: CLINIC | Age: 79
End: 2024-07-17
Attending: OPHTHALMOLOGY
Payer: MEDICARE

## 2024-07-17 DIAGNOSIS — H35.3231 EXUDATIVE AGE-RELATED MACULAR DEGENERATION OF BOTH EYES WITH ACTIVE CHOROIDAL NEOVASCULARIZATION: Primary | ICD-10-CM

## 2024-07-17 DIAGNOSIS — H35.3134 ADVANCED ATROPHIC NONEXUDATIVE AGE-RELATED MACULAR DEGENERATION OF BOTH EYES WITH SUBFOVEAL INVOLVEMENT: ICD-10-CM

## 2024-07-17 PROCEDURE — 67028 INJECTION EYE DRUG: CPT | Mod: S$PBB,LT,, | Performed by: OPHTHALMOLOGY

## 2024-07-17 PROCEDURE — 92250 FUNDUS PHOTOGRAPHY W/I&R: CPT | Mod: PBBFAC,PO | Performed by: OPHTHALMOLOGY

## 2024-07-17 PROCEDURE — 67028 INJECTION EYE DRUG: CPT | Mod: PBBFAC,PO,LT | Performed by: OPHTHALMOLOGY

## 2024-07-17 PROCEDURE — 99999PBSHW PR PBB SHADOW TECHNICAL ONLY FILED TO HB: Mod: JZ,PBBFAC,,

## 2024-07-17 PROCEDURE — 99499 UNLISTED E&M SERVICE: CPT | Mod: S$PBB,,, | Performed by: OPHTHALMOLOGY

## 2024-07-17 RX ADMIN — PEGCETACOPLAN 15 MG: 15 INJECTION, SOLUTION INTRAVITREAL at 12:07

## 2024-07-17 NOTE — PROGRESS NOTES
Subjective:       Patient ID: Francoise De Jesus is a 78 y.o. female      No chief complaint on file.    History of Present Illness  HPI    1 week Syfovre OS/ FAF OU     Dls: 7/9/24 Dr. Potter     Pt ou doing well no problems with avastin inj ou  last week.     No pain   No flashes  No floaters    Eye meds  Refresh OU PRN   I-Caps PO BID     POHx  Wet AMD OU     S/p CE Bilateral 2017 Dr. Okeefe         Last edited by Enrique Potter MD on 7/17/2024 12:31 PM.        Imaging:    See report    Assessment/Plan:     1. Exudative age-related macular degeneration of both eyes with active choroidal neovascularization  Controlled OU today with q 12 wks wks s/p Av OU    IOP good, ONH appears stable OU      Due for inj in 11 wks    - Prior authorization Order  - Posterior Segment OCT Retina-Both eyes    1. Advanced atrophic nonexudative age-related macular degeneration of both eyes with subfoveal involvement  Tolerating Syfovre well  Rec CPM q 8 wks  Due today    - Prior authorization Order      Follow up in about 8 weeks (around 9/11/2024) for OCT and INJECTION ONLY, Injection Left eye, Syfovre.     Patient identified.  Timeout performed.    Risks, benefits, and alternatives to treatment were discussed in detail with the patient, including bleeding, infection (endophthalmitis), NAION, wet AMD, etc.  The patient voiced understanding and wished to proceed with the procedure.  See separate consent form.    Injection Procedure Note:  Diagnosis: Advanced dry AMD with geographic atrophy Left Eye    Topical Proparacaine drop placed then topical 5% Betadine  Sterile gloves used, and sterile lid speculum placed.  5% Betadine placed at tap and injection site again   Syfovre 15mg in 0.1cc Injected inferotemporally 3.5-4mm posterior to the limbus.  Complications: None  Va at least CF at 5 feet post injection.  Retina, ONH, IOP normal after injection.    Followup as above.  Patient should return immediately PRN.  Retinal Detachment  and Endophthalmitis precautions given.

## 2024-08-27 DIAGNOSIS — N18.30 BENIGN HYPERTENSION WITH CHRONIC KIDNEY DISEASE, STAGE III: ICD-10-CM

## 2024-08-27 DIAGNOSIS — K21.9 GASTROESOPHAGEAL REFLUX DISEASE WITHOUT ESOPHAGITIS: ICD-10-CM

## 2024-08-27 DIAGNOSIS — I12.9 BENIGN HYPERTENSION WITH CHRONIC KIDNEY DISEASE, STAGE III: ICD-10-CM

## 2024-08-27 DIAGNOSIS — E78.49 OTHER HYPERLIPIDEMIA: ICD-10-CM

## 2024-08-27 RX ORDER — HYDROCHLOROTHIAZIDE 25 MG/1
25 TABLET ORAL DAILY
Qty: 90 TABLET | Refills: 0 | Status: SHIPPED | OUTPATIENT
Start: 2024-08-27

## 2024-08-27 RX ORDER — PRAVASTATIN SODIUM 10 MG/1
10 TABLET ORAL DAILY
Qty: 90 TABLET | Refills: 0 | Status: SHIPPED | OUTPATIENT
Start: 2024-08-27

## 2024-08-27 RX ORDER — LISINOPRIL 40 MG/1
40 TABLET ORAL EVERY MORNING
Qty: 90 TABLET | Refills: 0 | Status: SHIPPED | OUTPATIENT
Start: 2024-08-27

## 2024-08-27 RX ORDER — OMEPRAZOLE 40 MG/1
CAPSULE, DELAYED RELEASE ORAL
Qty: 90 CAPSULE | Refills: 0 | Status: SHIPPED | OUTPATIENT
Start: 2024-08-27

## 2024-08-27 RX ORDER — FENOFIBRATE 145 MG/1
145 TABLET, FILM COATED ORAL DAILY
Qty: 90 TABLET | Refills: 0 | Status: SHIPPED | OUTPATIENT
Start: 2024-08-27

## 2024-08-27 NOTE — TELEPHONE ENCOUNTER
----- Message from Wilfrido Milo sent at 8/27/2024 10:03 AM CDT -----  Regarding: self  Type: Patient Call Back    Who called:self    What is the request in detail:pt is calling to speak to nurse in this office about getting a medication refilled because she will be out by the time of her next appt    Can the clinic reply by [facility]?no    Would the patient rather a call back or a response via [facility]? callback    Best call back number:[phone]    Additional Information:

## 2024-08-27 NOTE — TELEPHONE ENCOUNTER
Spoke with patient. Patient requesting refill on all medication.   Patient has no other questions or concerns at this time.

## 2024-08-27 NOTE — TELEPHONE ENCOUNTER
Care Due:                  Date            Visit Type   Department     Provider  --------------------------------------------------------------------------------                                [facility]      MED/ INTERNAL  Laurentia Kailyn  Last Visit: 03-      CARE ([facility])   MED/ PEDS      Mathieu Auguste                              [facility]/ INTERNAL  [facility]  Next Visit: 10-      CARE ([facility])   MED/ PEDS      Josekeler  Molina                                                            Last  Test          Frequency    Reason                     Performed    Due Date  --------------------------------------------------------------------------------    CBC.........  12 months..  fenofibrate..............  03- 03-    CMP.........  12 months..  fenofibrate,               03- 03-                             hydroCHLOROthiazide,                             lisinopriL, pravastatin..    [facility] Embedded Care Due Messages. Reference number: [id number].   8/27/2024 10:47:55 AM CDT

## 2024-09-10 ENCOUNTER — TELEPHONE (OUTPATIENT)
Dept: OPHTHALMOLOGY | Facility: CLINIC | Age: 79
End: 2024-09-10
Payer: MEDICARE

## 2024-09-18 ENCOUNTER — PROCEDURE VISIT (OUTPATIENT)
Dept: OPHTHALMOLOGY | Facility: CLINIC | Age: 79
End: 2024-09-18
Attending: OPHTHALMOLOGY
Payer: MEDICARE

## 2024-09-18 DIAGNOSIS — H35.3231 EXUDATIVE AGE-RELATED MACULAR DEGENERATION OF BOTH EYES WITH ACTIVE CHOROIDAL NEOVASCULARIZATION: Primary | ICD-10-CM

## 2024-09-18 PROCEDURE — 92134 CPTRZ OPH DX IMG PST SGM RTA: CPT | Mod: PBBFAC,PO | Performed by: OPHTHALMOLOGY

## 2024-09-18 PROCEDURE — 67028 INJECTION EYE DRUG: CPT | Mod: PBBFAC,PO,LT | Performed by: OPHTHALMOLOGY

## 2024-09-18 NOTE — PROGRESS NOTES
Subjective:       Patient ID: Francoise De Jesus is a 79 y.o. female      Chief Complaint   Patient presents with    Procedure     Synfovre os     History of Present Illness  HPI     Procedure     Additional comments: Synfovre os           Comments    DLS: 7/17/24    Pt here for 8 week follow up with Synfovre OS today  Pt states no changes since last exam and feels vision is the same  (-) pain (-) floaters (-) flashes    1. Wet ARMD OU with active CNV  -S/p Avastin OD (4/17/24)  -S/p Avastin OS (7/9/24)    2. 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        Imaging:    See report    Assessment/Plan:     1. Exudative age-related macular degeneration of both eyes with active choroidal neovascularization  Controlled OU today with q 12 wks wks s/p Av OU    Due for inj in 2 wks    - Prior authorization Order  - Posterior Segment OCT Retina-Both eyes    1. Advanced atrophic nonexudative age-related macular degeneration of both eyes with subfoveal involvement  Tolerating Syfovre well  Rec CPM q 8 wks  Due today  Considering OD but need to follow FAF.      - Prior authorization Order      Follow up in about 2 weeks (around 10/2/2024), or if symptoms worsen or fail to improve, for OCT Mac, Fundus Autofluoresence, Injection Both eyes, Avastin, Dilated examination.     Patient identified.  Timeout performed.    Risks, benefits, and alternatives to treatment were discussed in detail with the patient, including bleeding, infection (endophthalmitis), NAION, wet AMD, etc.  The patient voiced understanding and wished to proceed with the procedure.  See separate consent form.    Injection Procedure Note:  Diagnosis: Advanced dry AMD with geographic atrophy Left Eye    Topical Proparacaine drop placed then topical 5% Betadine  Sterile gloves used, and sterile lid speculum placed.  5% Betadine placed at tap and  injection site again   Syfovre 15mg in 0.1cc Injected inferotemporally 3.5-4mm posterior to the limbus.  Complications: None  Va at least CF at 5 feet post injection.  Retina, ONH, IOP normal after injection.    Followup as above.  Patient should return immediately PRN.  Retinal Detachment and Endophthalmitis precautions given.

## 2024-10-02 ENCOUNTER — PROCEDURE VISIT (OUTPATIENT)
Dept: OPHTHALMOLOGY | Facility: CLINIC | Age: 79
End: 2024-10-02
Attending: OPHTHALMOLOGY
Payer: MEDICARE

## 2024-10-02 DIAGNOSIS — H35.3134 ADVANCED ATROPHIC NONEXUDATIVE AGE-RELATED MACULAR DEGENERATION OF BOTH EYES WITH SUBFOVEAL INVOLVEMENT: ICD-10-CM

## 2024-10-02 DIAGNOSIS — H35.3231 EXUDATIVE AGE-RELATED MACULAR DEGENERATION OF BOTH EYES WITH ACTIVE CHOROIDAL NEOVASCULARIZATION: Primary | ICD-10-CM

## 2024-10-02 PROCEDURE — 92134 CPTRZ OPH DX IMG PST SGM RTA: CPT | Mod: PBBFAC,PO | Performed by: OPHTHALMOLOGY

## 2024-10-02 PROCEDURE — 67028 INJECTION EYE DRUG: CPT | Mod: 50,PBBFAC,PO | Performed by: OPHTHALMOLOGY

## 2024-10-02 NOTE — PROGRESS NOTES
Subjective:       Patient ID: Francoise De Jesus is a 79 y.o. female      Chief Complaint   Patient presents with    Injections     History of Present Illness  HPI    2 wk OCT/DFE/FAF/Oksana OU    Pt here for 2 week follow up and Avastin OU today  Pt states no changes since last exam and feels vision is the same  (-) pain (-) floaters (-) flashes    1. Wet ARMD OU with active CNV  -S/p Avastin OD (4/17/24)  -S/p Avastin OS (7/9/24)    2. 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        Imaging:    See report    Assessment/Plan:     1. Exudative age-related macular degeneration of both eyes with active choroidal neovascularization  Controlled OU today with q 12 wks wks s/p Av OU    IOP good, ONH appears stable OU  Avas OU today and q 12 wks    - Prior authorization Order  - Posterior Segment OCT Retina-Both eyes    1. Advanced atrophic nonexudative age-related macular degeneration of both eyes with subfoveal involvement  Tolerating Syfovre well OS  Rec CPM q 8 wks  return in 6 wk for Sy OS  Discussed starting Sy OD.  OD GA not convincingly changing on FAF but only recently following.  Will cont to follow with FAF    - Prior authorization Order      Follow up in about 6 weeks (around 11/13/2024), or if symptoms worsen or fail to improve, for OCT and INJECTION ONLY, Injection Left eye, Syfovre.     Patient identified.  Timeout performed.    Risks, benefits, and alternatives to treatment were discussed in detail with the patient, including bleeding/infection (endophthalmitis)/etc.  The patient voiced understanding and wished to proceed with the procedure.  See separate consent form.    Injection Procedure Note:  Diagnosis: Wet AMD Right Eye    Topical Proparacaine drop placed then topical 5% Betadine  Sterile gloves used, and sterile lid speculum placed.  5% Betadine placed at injection site again  prior to injection.  Avastin 1.25mg in 0.05cc Injected inferotemporally 3.5-4mm posterior to the limbus.  Complications: None  Va at least CF at 5 feet post injection.  Retina, ONH, IOP normal after injection.    Patient identified.  Timeout performed.    Risks, benefits, and alternatives to treatment were discussed in detail with the patient, including bleeding/infection (endophthalmitis)/etc.  The patient voiced understanding and wished to proceed with the procedure.  See separate consent form.    Injection Procedure Note:  Diagnosis: Wet AMD Left Eye    Topical Proparacaine drop placed then topical 5% Betadine  Sterile gloves used, and sterile lid speculum placed.  5% Betadine placed at injection site again prior to injection.  Avastin 1.25mg in 0.05cc Injected inferotemporally 3.5-4mm posterior to the limbus.  Complications: None  Va at least CF at 5 feet post injection.  Retina, ONH, IOP normal after injection.    Followup as above.  Patient should return immediately PRN.  Retinal Detachment and Endophthalmitis precautions given.

## 2024-10-04 ENCOUNTER — TELEPHONE (OUTPATIENT)
Dept: FAMILY MEDICINE | Facility: CLINIC | Age: 79
End: 2024-10-04
Payer: MEDICARE

## 2024-10-23 ENCOUNTER — OFFICE VISIT (OUTPATIENT)
Dept: FAMILY MEDICINE | Facility: CLINIC | Age: 79
End: 2024-10-23
Payer: MEDICARE

## 2024-10-23 VITALS
DIASTOLIC BLOOD PRESSURE: 60 MMHG | BODY MASS INDEX: 35.88 KG/M2 | TEMPERATURE: 98 F | WEIGHT: 190.06 LBS | OXYGEN SATURATION: 96 % | HEART RATE: 73 BPM | SYSTOLIC BLOOD PRESSURE: 116 MMHG | HEIGHT: 61 IN

## 2024-10-23 DIAGNOSIS — R73.03 PREDIABETES: ICD-10-CM

## 2024-10-23 DIAGNOSIS — K21.9 GASTROESOPHAGEAL REFLUX DISEASE WITHOUT ESOPHAGITIS: ICD-10-CM

## 2024-10-23 DIAGNOSIS — I70.0 ATHEROSCLEROSIS OF AORTA: ICD-10-CM

## 2024-10-23 DIAGNOSIS — N18.31 STAGE 3A CHRONIC KIDNEY DISEASE: ICD-10-CM

## 2024-10-23 DIAGNOSIS — Z23 FLU VACCINE NEED: ICD-10-CM

## 2024-10-23 DIAGNOSIS — E78.49 OTHER HYPERLIPIDEMIA: ICD-10-CM

## 2024-10-23 DIAGNOSIS — N18.30 BENIGN HYPERTENSION WITH CHRONIC KIDNEY DISEASE, STAGE III: Primary | ICD-10-CM

## 2024-10-23 DIAGNOSIS — Z23 NEED FOR TDAP VACCINATION: ICD-10-CM

## 2024-10-23 DIAGNOSIS — E66.01 SEVERE OBESITY (BMI 35.0-39.9) WITH COMORBIDITY: ICD-10-CM

## 2024-10-23 DIAGNOSIS — Z23 NEED FOR SHINGLES VACCINE: ICD-10-CM

## 2024-10-23 DIAGNOSIS — I12.9 BENIGN HYPERTENSION WITH CHRONIC KIDNEY DISEASE, STAGE III: Primary | ICD-10-CM

## 2024-10-23 DIAGNOSIS — M67.432 GANGLION CYST OF DORSUM OF LEFT WRIST: ICD-10-CM

## 2024-10-23 PROCEDURE — 99999 PR PBB SHADOW E&M-EST. PATIENT-LVL III: CPT | Mod: PBBFAC,,, | Performed by: INTERNAL MEDICINE

## 2024-10-23 PROCEDURE — 99213 OFFICE O/P EST LOW 20 MIN: CPT | Mod: PBBFAC,PO | Performed by: INTERNAL MEDICINE

## 2024-10-23 PROCEDURE — 99214 OFFICE O/P EST MOD 30 MIN: CPT | Mod: S$PBB,,, | Performed by: INTERNAL MEDICINE

## 2024-10-23 RX ORDER — FENOFIBRATE 145 MG/1
145 TABLET, FILM COATED ORAL DAILY
Qty: 90 TABLET | Refills: 2 | Status: SHIPPED | OUTPATIENT
Start: 2024-10-23

## 2024-10-23 RX ORDER — HYDROCHLOROTHIAZIDE 25 MG/1
25 TABLET ORAL DAILY
Qty: 90 TABLET | Refills: 2 | Status: SHIPPED | OUTPATIENT
Start: 2024-10-23

## 2024-10-23 RX ORDER — LISINOPRIL 40 MG/1
40 TABLET ORAL EVERY MORNING
Qty: 90 TABLET | Refills: 2 | Status: SHIPPED | OUTPATIENT
Start: 2024-10-23

## 2024-10-23 RX ORDER — PRAVASTATIN SODIUM 10 MG/1
10 TABLET ORAL DAILY
Qty: 90 TABLET | Refills: 2 | Status: SHIPPED | OUTPATIENT
Start: 2024-10-23

## 2024-10-23 RX ORDER — OMEPRAZOLE 40 MG/1
CAPSULE, DELAYED RELEASE ORAL
Qty: 90 CAPSULE | Refills: 1 | Status: SHIPPED | OUTPATIENT
Start: 2024-10-23

## 2024-10-23 NOTE — PROGRESS NOTES
CHIEF COMPLAINT:   Chief Complaint   Patient presents with    Annual Exam          HISTORY OF PRESENT ILLNESS:  Francoise De Jesus is a 79 y.o. female who presents to the clinic today for Annual Exam  .           Patient reports no new health concerns or symptoms. She has a ganglion cyst on her left dorsal wrist present since the beginning of the year, causing no pain or discomfort. Her weight has fluctuated, with her current weight at 190 lbs, up from 186 lbs in March of this year and down from 195 lbs in September of last year. Patient consumes about 3 bottles of water daily and occasionally drinks soft drinks. She denies any swelling in her legs.   She reports her 90-year-old sister came to live with her earlier this year.  They are doing well together.         Subjective    PAST MEDICAL HISTORY:  Past Medical History:   Diagnosis Date    Arthritis     Atherosclerosis of aorta     noted on CXR 11/17/2014    Breast cancer 2011    stage I right breast cancer    Disorder of kidney and ureter     History of peptic ulcer     Hyperlipidemia     Hypertension     Macular degeneration 03/13/2017    Morbid obesity     Prediabetes        PAST SURGICAL HISTORY:  Past Surgical History:   Procedure Laterality Date    BREAST BIOPSY Left     BREAST LUMPECTOMY Right 11/2011    lumpectomy and SN biopsy with radiation    CATARACT EXTRACTION W/  INTRAOCULAR LENS IMPLANT Left 07/22/2017    Dr. Okeefe    CATARACT EXTRACTION W/  INTRAOCULAR LENS IMPLANT Right 07/06/2017    Dr. Okeefe    CHOLECYSTECTOMY      EYE SURGERY      Lt cataract       SOCIAL HISTORY:  Social History     Socioeconomic History    Marital status:     Number of children: 2   Occupational History    Occupation:    Tobacco Use    Smoking status: Never    Smokeless tobacco: Never   Substance and Sexual Activity    Alcohol use: No    Drug use: No    Sexual activity: Not Currently     Partners: Male     Social Drivers of Health     Financial Resource  Strain: Low Risk  (9/29/2023)    Overall Financial Resource Strain (CARDIA)     Difficulty of Paying Living Expenses: Not hard at all   Food Insecurity: No Food Insecurity (9/29/2023)    Hunger Vital Sign     Worried About Running Out of Food in the Last Year: Never true     Ran Out of Food in the Last Year: Never true   Transportation Needs: No Transportation Needs (9/29/2023)    PRAPARE - Transportation     Lack of Transportation (Medical): No     Lack of Transportation (Non-Medical): No   Physical Activity: Insufficiently Active (9/29/2023)    Exercise Vital Sign     Days of Exercise per Week: 2 days     Minutes of Exercise per Session: 10 min   Stress: No Stress Concern Present (9/29/2023)    [place] [employer] - Occupational Stress Questionnaire     Feeling of Stress : Only a little   Housing Stability: Low Risk  (9/29/2023)    Housing Stability Vital Sign     Unable to Pay for Housing in the Last Year: No     Number of Places Lived in the Last Year: 1     Unstable Housing in the Last Year: No       FAMILY HISTORY:  Family History   Problem Relation Name Age of Onset    Stomach cancer Mother      Lung cancer Father      Hypertension Father      Breast cancer Sister Renita 58    Cataracts Sister Renita     Macular degeneration Sister Renita     Cancer Sister Renita         breast CA    Cataracts Sister Linnea     Macular degeneration Sister Linnea     Breast cancer Sister Linnea 72    Cancer Sister Linnea         breast CA     Diabetes Sister Linnea     Hypertension Sister Linnea     Macular degeneration Sister Blanca     Macular degeneration Sister Amita     Glaucoma Brother Yoni     Macular degeneration Brother Yoni         wet    Hypertension Brother Yoni     Macular degeneration Brother Chris         dry    Hypertension Brother Chris     Hypertension Brother Balwinder     Diabetes Brother Nigel     Hypertension Brother Nigel     Prostate cancer Brother Nigel     Cancer Brother Nigel 80        prostate      Hypertension Brother Gera     Hyperlipidemia Brother Gera     Leukemia Brother Gera     No Known Problems Son      No Known Problems Son      Ovarian cancer Neg Hx      Amblyopia Neg Hx      Blindness Neg Hx      Retinal detachment Neg Hx      Strabismus Neg Hx      Stroke Neg Hx      Thyroid disease Neg Hx         ALLERGIES AND MEDICATIONS: updated and reviewed.  Review of patient's allergies indicates:   Allergen Reactions    Medrol [methylprednisolone]      Depression and tearful    Penicillins Rash     Medication List with Changes/Refills   Current Medications    ACETAMINOPHEN (TYLENOL) 500 MG TABLET    Take 500 mg by mouth every 6 (six) hours as needed for Pain.    ANTIOX#10/OM3/DHA/EPA/LUT/ZEAX (I-CAPS ORAL)    Take 1 tablet by mouth 2 (two) times a day.    ASPIRIN (ECOTRIN) 81 MG EC TABLET    Take 1 tablet (81 mg total) by mouth once daily.   Changed and/or Refilled Medications    Modified Medication Previous Medication    FENOFIBRATE (TRICOR) 145 MG TABLET fenofibrate (TRICOR) 145 MG tablet       Take 1 tablet (145 mg total) by mouth once daily. 1 Tablet Oral Every evening    Take 1 tablet (145 mg total) by mouth once daily. 1 Tablet Oral Every evening    HYDROCHLOROTHIAZIDE (HYDRODIURIL) 25 MG TABLET hydroCHLOROthiazide (HYDRODIURIL) 25 MG tablet       Take 1 tablet (25 mg total) by mouth once daily. 1 Tablet Oral Every morning    Take 1 tablet (25 mg total) by mouth once daily. 1 Tablet Oral Every morning    LISINOPRIL (PRINIVIL,ZESTRIL) 40 MG TABLET lisinopriL (PRINIVIL,ZESTRIL) 40 MG tablet       Take 1 tablet (40 mg total) by mouth every morning.    Take 1 tablet (40 mg total) by mouth every morning.    OMEPRAZOLE (PRILOSEC) 40 MG CAPSULE omeprazole (PRILOSEC) 40 MG capsule       TAKE ONE CAPSULE BY MOUTH EVERY DAY AS NEEDED FOR HEARTBURN    TAKE ONE CAPSULE BY MOUTH EVERY DAY AS NEEDED FOR HEARTBURN    PRAVASTATIN (PRAVACHOL) 10 MG TABLET pravastatin (PRAVACHOL) 10 MG tablet       Take 1 tablet  "(10 mg total) by mouth once daily.    Take 1 tablet (10 mg total) by mouth once daily.       CARE TEAM:  Patient Care Team:  Lulu Auguste MD as PCP - General (Internal Medicine)  Kyler Hickman MD as Consulting Physician (Hematology and Oncology)  Wang Agarwal OD as Consulting Physician (Optometry)  Kyler Hickman MD as Consulting Physician (Hematology and Oncology)  Enrique Potter MD as Consulting Physician (Ophthalmology)       REVIEW OF SYSTEMS:  Review of Systems   Constitutional:  Negative for chills and fever.   Respiratory:  Negative for cough and shortness of breath.    Cardiovascular:  Negative for chest pain.   Genitourinary:  Negative for dysuria.   Musculoskeletal:  Positive for arthralgias (chronic/stable).              Objective    PHYSICAL EXAMINATION/VITALS:  Vitals:    10/23/24 1401   BP: 116/60   Pulse: 73   Temp: 98 °F (36.7 °C)   TempSrc: Oral   SpO2: 96%   Weight: 86.2 kg (190 lb 0.6 oz)   Height: 5' 1" (1.549 m)       Body mass index is 35.91 kg/m².      General appearance - alert, well appearing, and in no distress, obese, exam limited as patient did not get onto the examination table  Psychiatric - alert, oriented to person, place, and time, normal behavior, speech, dress, motor activity and thought process  Eyes - pupils equal and reactive, extraocular eye movements intact, sclera anicteric  Neck - supple, no significant adenopathy, carotids upstroke normal bilaterally, no bruits  Lymphatics - no palpable cervical lymphadenopathy  Chest - clear to auscultation, no wheezes, rales or rhonchi, symmetric air entry  Heart - normal rate and regular rhythm  Neurological - alert, normal speech, no focal findings; cranial nerves II through XII intact  Musculoskeletal - patient noted to have Moderate osteoarthritic changes to both knee joints. No joint effusions noted.  Extremities - no pedal edema noted, varicose veins noted - mild (L>R)  Skin - normal coloration, no " suspicious skin lesions; 1-1/2 cm ganglion cyst noted on dorsum of left hand without erythema, warmth, or pain to palpation                  LABS:  Results for orders placed or performed in visit on 03/22/24   Lipid Panel    Collection Time: 03/22/24 11:18 AM   Result Value Ref Range    Cholesterol 134 120 - 199 mg/dL    Triglycerides 79 30 - 150 mg/dL    HDL 50 40 - 75 mg/dL    LDL Cholesterol 68.2 63.0 - 159.0 mg/dL    HDL/Cholesterol Ratio 37.3 20.0 - 50.0 %    Total Cholesterol/HDL Ratio 2.7 2.0 - 5.0    Non-HDL Cholesterol 84 mg/dL   Hemoglobin A1C    Collection Time: 03/22/24 11:18 AM   Result Value Ref Range    Hemoglobin A1C 5.4 4.0 - 5.6 %    Estimated Avg Glucose 108 68 - 131 mg/dL                ASSESSMENT AND PLAN:  1. Benign hypertension with chronic kidney disease, stage III/2. Stage 3a chronic kidney disease  BP Readings from Last 1 Encounters:   10/23/24 116/60      Discussed sodium restriction, maintaining ideal body weight and regular exercise program as physiologic means to achieve blood pressure control. The patient will strive towards this.   The current medical regimen is effective;  continue present plan and medications. Recommended patient to check home readings to monitor and see me for followup as scheduled or sooner as needed.   Patient was educated that both decongestant and anti-inflammatory medication may raise blood pressure.  Stable decreased kidney function. Observe. Patient counseled to avoid/minimize the use of anti-inflammatory  Medication. Discussed to stay well hydrated. Also discussed with patient that good control of blood pressure and/or diabetes, if present, will help to prevent progression.  The patient is not active on the digital hypertension program.  -     CBC Auto Differential; Future; Expected date: 01/23/2025  -     lisinopriL (PRINIVIL,ZESTRIL) 40 MG tablet; Take 1 tablet (40 mg total) by mouth every morning.  Dispense: 90 tablet; Refill: 2  -      hydroCHLOROthiazide (HYDRODIURIL) 25 MG tablet; Take 1 tablet (25 mg total) by mouth once daily. 1 Tablet Oral Every morning  Dispense: 90 tablet; Refill: 2    3. Other hyperlipidemia  Lab Results   Component Value Date    CHOL 134 03/22/2024     Lab Results   Component Value Date    HDL 50 03/22/2024     Lab Results   Component Value Date    LDLCALC 68.2 03/22/2024     Lab Results   Component Value Date    TRIG 79 03/22/2024     Lab Results   Component Value Date    LDLCALC 68.2 03/22/2024     We discussed low fat diet and regular exercise.The current medical regimen is effective;  continue present plan and medications.   -     Lipid Panel; Future; Expected date: 01/23/2025  -     Comprehensive Metabolic Panel; Future; Expected date: 01/23/2025  -     pravastatin (PRAVACHOL) 10 MG tablet; Take 1 tablet (10 mg total) by mouth once daily.  Dispense: 90 tablet; Refill: 2  -     fenofibrate (TRICOR) 145 MG tablet; Take 1 tablet (145 mg total) by mouth once daily. 1 Tablet Oral Every evening  Dispense: 90 tablet; Refill: 2    4. Prediabetes  Lab Results   Component Value Date    HGBA1C 5.4 03/22/2024     Stable. We discussed low sugar/low carbohydrate diet and regular exercise to prevent progression. No need for prescription medication at this time.  Check A1c yearly.  -     Hemoglobin A1C; Future; Expected date: 01/23/2025    5. Atherosclerosis of aorta  Patient with Atherosclerosis of the Aorta.  Stable/asymptomatic. Currently stable on lipid lowering medication and blood pressure monitoring.  Overview:  noted on CXR 11/17/2014      6. Gastroesophageal reflux disease without esophagitis  Symptoms controlled: yes - takes medication occasionally as needed.   Reflux precautions discussed (eliminate tobacco if a smoker; minimize caffeine, chocolate and red/white peppermint intake; avoid heavy and spicy meals; don't lay down within 2-3 hours after eating; minimize the intake of NSAIDs). Medication as needed.   Patient  asked to take medication breaks, if possible - discussed chronic use can limit calcium absorption (which can lead to osteopenia/osteoporosis), increases the risk for intestinal infections, and can cause kidney damage. There are also some newer studies that show possible increased risk of mortality.  -     omeprazole (PRILOSEC) 40 MG capsule; TAKE ONE CAPSULE BY MOUTH EVERY DAY AS NEEDED FOR HEARTBURN  Dispense: 90 capsule; Refill: 1    7. Severe obesity (BMI 35.0-39.9) with comorbidity  BMI Readings from Last 3 Encounters:   10/23/24 35.91 kg/m²   03/22/24 35.26 kg/m²   09/29/23 36.84 kg/m²     The patient is asked to continue to make an attempt to improve diet and exercise patterns to aid in medical management of this problem.     8. Need for shingles vaccine  Patient was advised to get immunization at the pharmacy.    9. Flu vaccine need  Previously completed. Chart updated.    10. Need for Tdap vaccination  Patient was advised to get immunization at the pharmacy.    11. Ganglion cyst of dorsum of left wrist  Advise patient this is a benign lesion that is not concerning.  It may get bigger.  It may also disappear.  Consider referral to Orthopedics for removal if she becomes symptomatic.                PATIENT EDUCATION:  Explained RSV (RSV) and its potential impact on adults  Discussed the nature of ganglion cysts, emphasizing benign nature and typical behavior    ACTION ITEMS/LIFESTYLE:  Patient to continue water intake (approximately 3 bottles per day)  Recommend limiting soft drink consumption to occasional small servings          Orders Placed This Encounter   Procedures    Hemoglobin A1C    Lipid Panel    Comprehensive Metabolic Panel    CBC Auto Differential      FOLLOW UP: Follow up in about 10 months (around 8/23/2025), or if symptoms worsen or fail to improve, for follow up chronic medical conditions.. or sooner as needed.    This note was generated with the assistance of ambient listening technology.  Verbal consent was obtained by the patient and accompanying visitor(s) for the recording of patient appointment to facilitate this note. I attest to having reviewed and edited the generated note for accuracy, though some syntax or spelling errors may persist. Please contact the author of this note for any clarification.

## 2024-11-13 ENCOUNTER — PROCEDURE VISIT (OUTPATIENT)
Dept: OPHTHALMOLOGY | Facility: CLINIC | Age: 79
End: 2024-11-13
Attending: OPHTHALMOLOGY
Payer: MEDICARE

## 2024-11-13 DIAGNOSIS — H35.3134 ADVANCED ATROPHIC NONEXUDATIVE AGE-RELATED MACULAR DEGENERATION OF BOTH EYES WITH SUBFOVEAL INVOLVEMENT: ICD-10-CM

## 2024-11-13 DIAGNOSIS — H35.3231 EXUDATIVE AGE-RELATED MACULAR DEGENERATION OF BOTH EYES WITH ACTIVE CHOROIDAL NEOVASCULARIZATION: Primary | ICD-10-CM

## 2024-11-13 PROCEDURE — 67028 INJECTION EYE DRUG: CPT | Mod: PBBFAC,PO,LT | Performed by: OPHTHALMOLOGY

## 2024-11-13 PROCEDURE — 92134 CPTRZ OPH DX IMG PST SGM RTA: CPT | Mod: PBBFAC,PO | Performed by: OPHTHALMOLOGY

## 2024-11-13 NOTE — PROGRESS NOTES
Subjective:       Patient ID: Francoise De Jesus is a 79 y.o. female      Chief Complaint   Patient presents with    Concerns About Ocular Health     History of Present Illness  HPI    Dls: 10/2/24 Dr. Potter     78 y/o female presents today for 6 weeks  OCT and OS Syfovre.  Pt states no changes ou doing well.      No pain  No floaters  No flashes    Eye meds  ICAPS PO BID  Refresh OU PRN     POHX:  1. Wet ARMD OU with active CNV  -S/p Avastin OD (4/17/24)  -S/p Avastin OS (7/9/24)    2. 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        Imaging:    See report    Assessment/Plan:     1. 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 Advanced atrophic nonexudative age-related macular degeneration of both eyes with subfoveal involvement  Tolerating Syfovre well OS  Rec CPM q 8 wks  Due today    Discussed starting Sy OD.  OD GA not convincingly changing on FAF but only recently following.  Will cont to follow with FAF    - Prior authorization Order      Follow up in 5 weeks (on 12/18/2024), or if symptoms worsen or fail to improve, for OCT and INJECTION ONLY, Injection Right eye, Injection Left eye, Avastin.     Patient identified.  Timeout performed.    Risks, benefits, and alternatives to treatment were discussed in detail with the patient, including bleeding/infection (endophthalmitis)/etc.  The patient voiced understanding and wished to proceed with the procedure.  See separate consent form.    Injection Procedure Note:  Diagnosis: Wet AMD Right Eye    Topical Proparacaine drop placed then topical 5% Betadine  Sterile gloves used, and sterile lid speculum placed.  5% Betadine placed at  injection site again prior to injection.  Avastin 1.25mg in 0.05cc Injected inferotemporally 3.5-4mm posterior to the limbus.  Complications: None  Va at least CF at 5 feet post injection.  Retina, ONH, IOP normal after injection.    Patient identified.  Timeout performed.    Risks, benefits, and alternatives to treatment were discussed in detail with the patient, including bleeding/infection (endophthalmitis)/etc.  The patient voiced understanding and wished to proceed with the procedure.  See separate consent form.    Injection Procedure Note:  Diagnosis: Wet AMD Left Eye    Topical Proparacaine drop placed then topical 5% Betadine  Sterile gloves used, and sterile lid speculum placed.  5% Betadine placed at injection site again prior to injection.  Avastin 1.25mg in 0.05cc Injected inferotemporally 3.5-4mm posterior to the limbus.  Complications: None  Va at least CF at 5 feet post injection.  Retina, ONH, IOP normal after injection.    Followup as above.  Patient should return immediately PRN.  Retinal Detachment and Endophthalmitis precautions given.

## 2024-12-18 ENCOUNTER — PROCEDURE VISIT (OUTPATIENT)
Dept: OPHTHALMOLOGY | Facility: CLINIC | Age: 79
End: 2024-12-18
Attending: OPHTHALMOLOGY
Payer: MEDICARE

## 2024-12-18 DIAGNOSIS — H35.3231 EXUDATIVE AGE-RELATED MACULAR DEGENERATION OF BOTH EYES WITH ACTIVE CHOROIDAL NEOVASCULARIZATION: Primary | ICD-10-CM

## 2024-12-18 PROCEDURE — 92134 CPTRZ OPH DX IMG PST SGM RTA: CPT | Mod: PBBFAC,PO | Performed by: OPHTHALMOLOGY

## 2024-12-18 PROCEDURE — 67028 INJECTION EYE DRUG: CPT | Mod: 50,S$PBB,, | Performed by: OPHTHALMOLOGY

## 2024-12-18 PROCEDURE — 67028 INJECTION EYE DRUG: CPT | Mod: 50,PBBFAC,PO | Performed by: OPHTHALMOLOGY

## 2024-12-18 PROCEDURE — 99999PBSHW PR PBB SHADOW TECHNICAL ONLY FILED TO HB: Mod: JZ,PBBFAC,,

## 2024-12-18 PROCEDURE — 99499 UNLISTED E&M SERVICE: CPT | Mod: S$PBB,,, | Performed by: OPHTHALMOLOGY

## 2024-12-18 RX ADMIN — Medication 1.25 MG: at 03:12

## 2024-12-18 NOTE — PROGRESS NOTES
Subjective:       Patient ID: Francoise De Jesus is a 79 y.o. female      Chief Complaint   Patient presents with    Procedure     History of Present Illness  HPI    DLS: 11/13/24     Pt here for 5 week follow up and Avastin OU today  Pt states no changes and feels vision is the same since last exam  (-) pain (-) floaters (-) flashes    1. Wet ARMD OU with active CNV  -S/p Avastin OD (10/2/24)  -S/p Avastin OS (10/2/24)    2. 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        Imaging:    See report    Assessment/Plan:     1. 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 Advanced atrophic nonexudative age-related macular degeneration of both eyes with subfoveal involvement  Tolerating Syfovre well OS  Rec CPM q 8 wks  return in 3 wk for Sy OS  Discussed starting Sy OD.  OD GA not convincingly changing on FAF but only recently following.  Will cont to follow with FAF    - Prior authorization Order      Follow up in about 3 weeks (around 1/8/2025), or if symptoms worsen or fail to improve, for Fundus Autofluoresence, Injection Left eye, Syfovre.     Patient identified.  Timeout performed.    Risks, benefits, and alternatives to treatment were discussed in detail with the patient, including bleeding/infection (endophthalmitis)/etc.  The patient voiced understanding and wished to proceed with the procedure.  See separate consent form.    Injection Procedure Note:  Diagnosis: Wet AMD Right Eye    Topical Proparacaine drop placed then topical 5% Betadine  Sterile gloves used, and sterile lid speculum placed.  5% Betadine placed at injection site again prior to  injection.  Avastin 1.25mg in 0.05cc Injected inferotemporally 3.5-4mm posterior to the limbus.  Complications: None  Va at least CF at 5 feet post injection.  Retina, ONH, IOP normal after injection.    Patient identified.  Timeout performed.    Risks, benefits, and alternatives to treatment were discussed in detail with the patient, including bleeding/infection (endophthalmitis)/etc.  The patient voiced understanding and wished to proceed with the procedure.  See separate consent form.    Injection Procedure Note:  Diagnosis: Wet AMD Left Eye    Topical Proparacaine drop placed then topical 5% Betadine  Sterile gloves used, and sterile lid speculum placed.  5% Betadine placed at injection site again prior to injection.  Avastin 1.25mg in 0.05cc Injected inferotemporally 3.5-4mm posterior to the limbus.  Complications: None  Va at least CF at 5 feet post injection.  Retina, ONH, IOP normal after injection.    Followup as above.  Patient should return immediately PRN.  Retinal Detachment and Endophthalmitis precautions given.

## 2025-01-02 ENCOUNTER — PATIENT OUTREACH (OUTPATIENT)
Dept: ADMINISTRATIVE | Facility: OTHER | Age: 80
End: 2025-01-02
Payer: MEDICARE

## 2025-01-08 ENCOUNTER — PROCEDURE VISIT (OUTPATIENT)
Dept: OPHTHALMOLOGY | Facility: CLINIC | Age: 80
End: 2025-01-08
Attending: OPHTHALMOLOGY
Payer: MEDICARE

## 2025-01-08 DIAGNOSIS — H35.3134 ADVANCED ATROPHIC NONEXUDATIVE AGE-RELATED MACULAR DEGENERATION OF BOTH EYES WITH SUBFOVEAL INVOLVEMENT: ICD-10-CM

## 2025-01-08 DIAGNOSIS — H35.3231 EXUDATIVE AGE-RELATED MACULAR DEGENERATION OF BOTH EYES WITH ACTIVE CHOROIDAL NEOVASCULARIZATION: Primary | ICD-10-CM

## 2025-01-08 PROCEDURE — 92250 FUNDUS PHOTOGRAPHY W/I&R: CPT | Mod: PBBFAC,PO | Performed by: OPHTHALMOLOGY

## 2025-01-08 PROCEDURE — 99499 UNLISTED E&M SERVICE: CPT | Mod: S$PBB,,, | Performed by: OPHTHALMOLOGY

## 2025-01-08 NOTE — PROGRESS NOTES
Subjective:       Patient ID: Francoise De Jesus is a 79 y.o. female      Chief Complaint   Patient presents with    Procedure     History of Present Illness  HPI    Sy OS Only     Pt states her vision is stable.   +Photophobia OS       I margarita Bid Po   Last edited by Enrique Potter MD on 1/8/2025  2:29 PM.        Imaging:    See report    Assessment/Plan:     1. Exudative age-related macular degeneration of both eyes with active choroidal neovascularization (Primary)  2. Advanced atrophic nonexudative age-related macular degeneration of both eyes with subfoveal involvement  Had Av OU 3 wks ago    Due for Syf OS today.    BUT Good Days not functional for copay assist.  Gave pt option of continuing with current Rx vs waiting for Good Days.  Checked with [facility] who says pt will be responsible for copay.  Will have pt RTC for Syf in 3 wks    Due for Av OU in about 2 months (getting q 12 wks, had 3 wks ago)      - Color Fundus Photography - OU - Both Eyes    Follow up in about 3 weeks (around 1/29/2025), or if symptoms worsen or fail to improve, for OCT and INJECTION ONLY, Injection Left eye, Syfovre.

## 2025-01-25 ENCOUNTER — PROCEDURE VISIT (OUTPATIENT)
Dept: OPHTHALMOLOGY | Facility: CLINIC | Age: 80
End: 2025-01-25
Attending: OPHTHALMOLOGY
Payer: MEDICARE

## 2025-01-25 DIAGNOSIS — H35.3231 EXUDATIVE AGE-RELATED MACULAR DEGENERATION OF BOTH EYES WITH ACTIVE CHOROIDAL NEOVASCULARIZATION: Primary | ICD-10-CM

## 2025-01-25 DIAGNOSIS — H35.3134 ADVANCED ATROPHIC NONEXUDATIVE AGE-RELATED MACULAR DEGENERATION OF BOTH EYES WITH SUBFOVEAL INVOLVEMENT: ICD-10-CM

## 2025-01-25 PROCEDURE — 99499 UNLISTED E&M SERVICE: CPT | Mod: S$PBB,,, | Performed by: OPHTHALMOLOGY

## 2025-01-25 PROCEDURE — 99999PBSHW PR PBB SHADOW TECHNICAL ONLY FILED TO HB: Mod: JZ,PBBFAC,,

## 2025-01-25 PROCEDURE — 96372 THER/PROPH/DIAG INJ SC/IM: CPT | Mod: PBBFAC,PO | Performed by: OPHTHALMOLOGY

## 2025-01-25 PROCEDURE — 67028 INJECTION EYE DRUG: CPT | Mod: PBBFAC,PO,LT | Performed by: OPHTHALMOLOGY

## 2025-01-25 RX ADMIN — PEGCETACOPLAN 15 MG: 15 INJECTION, SOLUTION INTRAVITREAL at 11:01

## 2025-01-25 NOTE — PROGRESS NOTES
Subjective:       Patient ID: Francoise De Jesus is a 79 y.o. female      Chief Complaint   Patient presents with    Injections     History of Present Illness  HPI    Sy OS only    Eye meds: Refresh OU prn     Pt states no ocular changes since her last vision.       Last edited by Enrique Potter MD on 1/25/2025 11:49 AM.        Imaging:    See report    Assessment/Plan:     1. 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 Advanced atrophic nonexudative age-related macular degeneration of both eyes with subfoveal involvement  Tolerating Syfovre well OS  Rec CPM q 8 wks  Due today    - Prior authorization Order      Follow up in about 7 weeks (around 3/15/2025), or if symptoms worsen or fail to improve, for OCT and INJECTION ONLY (DILATE INJECTION EYE), Injection Right eye, Injection Left eye, Avastin.

## 2025-02-20 ENCOUNTER — OFFICE VISIT (OUTPATIENT)
Dept: FAMILY MEDICINE | Facility: CLINIC | Age: 80
End: 2025-02-20
Payer: MEDICARE

## 2025-02-20 VITALS
DIASTOLIC BLOOD PRESSURE: 56 MMHG | TEMPERATURE: 98 F | OXYGEN SATURATION: 99 % | HEART RATE: 78 BPM | WEIGHT: 198.44 LBS | SYSTOLIC BLOOD PRESSURE: 130 MMHG | BODY MASS INDEX: 37.49 KG/M2

## 2025-02-20 DIAGNOSIS — Z74.09 OTHER REDUCED MOBILITY: ICD-10-CM

## 2025-02-20 DIAGNOSIS — Z23 NEED FOR SHINGLES VACCINE: ICD-10-CM

## 2025-02-20 DIAGNOSIS — N18.31 STAGE 3A CHRONIC KIDNEY DISEASE: ICD-10-CM

## 2025-02-20 DIAGNOSIS — Z71.89 ADVANCED DIRECTIVES, COUNSELING/DISCUSSION: ICD-10-CM

## 2025-02-20 DIAGNOSIS — I70.0 ATHEROSCLEROSIS OF AORTA: ICD-10-CM

## 2025-02-20 DIAGNOSIS — H35.3231 EXUDATIVE AGE-RELATED MACULAR DEGENERATION OF BOTH EYES WITH ACTIVE CHOROIDAL NEOVASCULARIZATION: ICD-10-CM

## 2025-02-20 DIAGNOSIS — Z00.00 ENCOUNTER FOR PREVENTIVE HEALTH EXAMINATION: Primary | ICD-10-CM

## 2025-02-20 DIAGNOSIS — E66.01 SEVERE OBESITY (BMI 35.0-39.9) WITH COMORBIDITY: ICD-10-CM

## 2025-02-20 PROCEDURE — 99213 OFFICE O/P EST LOW 20 MIN: CPT | Mod: PBBFAC,PO | Performed by: NURSE PRACTITIONER

## 2025-02-20 NOTE — PROGRESS NOTES
Francoise De Jesus presented for a follow-up Medicare AWV today. The following components were reviewed and updated:    Medical history  Family History  Social history  Allergies and Current Medications  Health Risk Assessment  Health Maintenance  Care Team    **See Completed Assessments for Annual Wellness visit with in the encounter summary    The following assessments were completed:  Depression Screening  Cognitive function Screening  Timed Get Up Test  Whisper Test      Opioid documentation:      Patient does not have a current opioid prescription.          Vitals:    02/20/25 1316   BP: (!) 130/56   BP Location: Left arm   Patient Position: Sitting   Pulse: 78   Temp: 98 °F (36.7 °C)   TempSrc: Oral   SpO2: 99%   Weight: 90 kg (198 lb 6.6 oz)     Body mass index is 37.49 kg/m².       Physical Exam  Constitutional:       Appearance: She is obese. She is not ill-appearing.   HENT:      Head: Normocephalic and atraumatic.   Eyes:      General: No scleral icterus.        Right eye: No discharge.         Left eye: No discharge.   Cardiovascular:      Rate and Rhythm: Normal rate and regular rhythm.      Pulses: Normal pulses.      Heart sounds: No murmur heard.  Pulmonary:      Effort: Pulmonary effort is normal. No respiratory distress.      Breath sounds: Normal breath sounds. No wheezing.   Skin:     General: Skin is warm and dry.      Capillary Refill: Capillary refill takes 2 to 3 seconds.   Neurological:      Mental Status: She is alert and oriented to person, place, and time. Mental status is at baseline.   Psychiatric:         Mood and Affect: Mood normal.           Diagnoses and health risks identified today and associated recommendations/orders:  1. Encounter for preventive health examination  Counseled on age appropriate medical preventative services including age appropriate cancer screenings, age appropriate eye and dental exams, over all nutritional health, need for a consistent exercise regimen, and an  over all push towards maintaining a vigorous and active lifestyle.  Counseled on age appropriate vaccines and discussed upcoming health care needs based on age/gender. Discussed good sleep hygiene and stress management.     2. Advanced directives, counseling/discussion  I offered to discuss advanced care planning, including how to pick a person who would make decisions for you if you were unable to make them for yourself, called a health care power of , and what kind of decisions you might make such as use of life sustaining treatments such as ventilators and tube feeding when faced with a life limiting illness recorded on a living will that they will need to know. (How you want to be cared for as you near the end of your natural life)     X Patient is interested in learning more about how to make advanced directives.  I provided them paperwork and offered to discuss this with them.     3. Severe obesity (BMI 35.0-39.9) with comorbidity  We discussed weight issues and safe, effective ways of losing pounds, includin) diet:  low carbohydrate, low fat diet, stay away from fast food, fried and processed food, use whole grain, lot of fruits and vegetables, use healthy fat such as avocado, nuts and olive oil in reasonable quantity, stay away from sodas. Regular meals with lean proteins.  2) physical activity: ideally 150 min a week, with cardiovascular and resistance activity.  Patient was encouraged to set realistic attainable goals for weight loss, and we will follow up periodically.  Mediterranean Diet recommendations (Adopted from Russ et al, NEJ, 2018.)  - Eat primarily plant-based foods, such as fruits and vegetables, whole grains, legumes (beans) and nuts  - Limit refined carbohydrates (white pasta, bread, rice).  - Replace butter with healthy fats such as olive oil.  - Use herbs and spices instead of salt to flavor foods.  - Limit red meat and processed meats to no more than a few times a  month.  - Avoid sugary sodas, bakery goods, and sweets.  - Eat fish and poultry at least twice a week.     4. Stage 3a chronic kidney disease  Continue sodium restriction, and avoidance of nephrotoxic agents.     5. Other reduced mobility  Denies falls  Fall precautions discussed    6. Need for shingles vaccine  - Ambulatory referral/consult to the [facility]    7. Exudative age-related macular degeneration of both eyes with active choroidal neovascularization  She is followed by optometry  Denies recent vision changes    8. Atherosclerosis of aorta  -assessed and addressed all modifiable risk factors.  Continue with appropriate management to prevent complications with regards to lipid and BP monitoring.       Provided [place] with a 5-10 year written screening schedule and personal prevention plan. Recommendations were developed using the USPSTF age appropriate recommendations. Education, counseling, and referrals were provided as needed.  After Visit Summary printed and given to patient which includes a list of additional screenings\tests needed.        Follow up in about 2 weeks (around 3/6/2025), or if symptoms worsen or fail to improve.      Jigar Stack NP

## 2025-02-20 NOTE — PATIENT INSTRUCTIONS
Counseling and Referral of Other Preventative  (Italic type indicates deductible and co-insurance are waived)    Patient Name: Francoise De Jesus  Today's Date: 2/20/2025    Health Maintenance         Date Due Completion Date    TETANUS VACCINE Never done ---    RSV Vaccine (Age 60+ and Pregnant patients) (1 - 1-dose 75+ series) Never done ---    Shingles Vaccine (2 of 2) 09/08/2021 7/14/2021    COVID-19 Vaccine (4 - 2024-25 season) 09/01/2024 10/23/2021    Hemoglobin A1c (Prediabetes) 03/22/2025 3/22/2024    Lipid Panel 03/22/2025 3/22/2024          No orders of the defined types were placed in this encounter.    The following information is provided to all patients.  This information is to help you find resources for any of the problems found today that may be affecting your health:                  Living healthy guide: [email or web address].[email or web address]      Understanding Diabetes: www.diabetes.org      Eating healthy: www.cdc.gov/healthyweight      [facility] home safety checklist: www.cdc.gov/steadi/patient.html      Agency on Aging: www.goea.[place].[facility]      Alcoholics anonymous (AA): www.aa.org      Physical Activity: www.andrea.nih.gov/vn5hbgc      Tobacco use: www.quitwithusla.org     Patient Education       Obesity Discharge Instructions, Adult   About this topic   Obesity is a health problem where your weight is higher than it should be. Doctors use a method called body mass index or BMI to measure whether you are at a healthy weight for your height. The doctor uses your weight and your height to determine your BMI. A high BMI can be an indicator of high body fat. Obesity is different from being overweight. Being overweight means your BMI is 25 or higher. Being obese means your BMI is 30 or higher. If your BMI is 40 or higher, you are considered severely or morbidly obese. Being obese may lead to many health problems. It may make it hard for you to breathe and move easily. It may raise your risk for illnesses like high blood sugar  and heart disease.  What care is needed at home?   Ask your doctor what you need to do when you go home. Make sure you understand everything the doctor says. This way you will know what you need to do.  Change your diet. Lower the calories in your diet. Ask your dietitian to help you set an eating plan that is right for you.  Get enough exercise. Talk to your doctor about the right amount of exercise for you.  Do not smoke.  Limit the amount of beer, wine, and mixed drinks (alcohol) you drink.  Keep a record of your weight. Write down what you eat and drink each day. This may help you be more aware of the choices you are making.  What drugs may be needed?   The doctor may order drugs to:  Treat health problems that may cause weight gain  Lower your appetite  Help you lose weight  Will physical activity be limited?   Talk to your doctor about the right amount of exercise for you. This is especially important if you have heart problems, other illnesses, or if you recently had surgery.  Start slowly by doing more everyday activities like yard work or household chores.  Choose activities that you like to do.  What changes to diet are needed?   Whole grains are a good source of carbohydrates and fiber. Try to eat 3 to 5 servings of whole grain, high fiber foods each day. These are things like whole grain bread, bran cereals, brown rice, and whole wheat pasta.  Fruits and vegetables are good sources of fiber, vitamins, and minerals. Try to pick many kinds and colors. This will help you get different nutrients in your diet. Choose fresh, frozen, or canned fruits and vegetables. Buy plain, frozen fruits without added sugar. Buy plain, frozen vegetables without added salt and fat. Buy canned fruit in 100% juice or water. Avoid canned fruit in syrups. Buy canned vegetables with no salt added.  Milk is a good source of protein and some vitamins and minerals. Choose low-fat (1%) or fat-free milk. Eat nonfat or low-fat cheeses,  ice creams, yogurt, and other dairy products.  Meats and beans are good sources of protein and iron. Eat more low-fat or lean meats like chicken without the skin, turkey without the skin, and fish. Eggs and peanut butter are good sources of protein as well. Dried peas, beans, and lentils are also good and contain fiber. Fatty fish, like salmon, tuna, mackerel, herring, and trout, are good to eat and have healthy omega-3 fats.  Good fats can give you long-term energy. These are found in fish, nuts, seeds, and avocados. Try using olive oil, safflower oil, and low-sodium, low-fat salad dressing as a topping on foods. Use canola, olive, or peanut oil for cooking. Other healthy oils include corn, sunflower, and soybean oils.  Limit sweets such as candy and sugary drinks. Try to drink water instead. Drink diet or no calorie beverages when you want something other than water.  Cut back on solid fats, like butter, lard, and coconut oil.  Limit fatty foods such as desserts, fried foods, and chips.  Trans fats should be avoided. Most trans fats are found in processed foods, commercially baked goods, and fried foods and are very unhealthy. Saturated fat, which is different from trans fat, should be watched and limited if portions are too large. Saturated fat is found mainly in animal sources, such as meat and dairy products. Saturated fat is also found in coconut and palm oils.  Limit processed meats and most processed foods.  Limit eating out. If you choose to visit a restaurant, ask for the nutritional facts. You may also be able to find nutritional facts online. Then, you can make a plan and choose healthy items. Watch the portion size. Have large portions split and take part home for some other meal.  What problems could happen?   Low mood or self-esteem  Anxiety  Muscle pain, joint pain, or arthritis  Sleep apnea  Diabetes  High blood pressure  High cholesterol  Heart, lung, or breathing problems  Kidney or liver  problems  Cancer  Gallstones  Increased sweating  Reduced fertility  Pregnancy complications  Gastroesophageal reflux disease  When do I need to call the doctor?   Signs of high or low blood sugar  Thoughts of hurting yourself  Teach Back: Helping You Understand   The Teach Back Method helps you understand the information we are giving you. After you talk with the staff, tell them in your own words what you learned. This helps to make sure the staff has described each thing clearly. It also helps to explain things that may have been confusing. Before going home, make sure you can do these:  I can tell you about my condition.  I can tell you what changes I need to make with my diet or drugs.  I can tell you what I will do if I have signs of a heart attack or stroke.  Where can I learn more?   Centers for Disease Control and Prevention  http://www.cdc.gov/obesity/adult/defining.html   NHS  http://www.nhs.uk/Conditions/Obesity/Pages/obesityprevention.aspx   Last Reviewed Date   2021-06-23  Consumer Information Use and Disclaimer   This information is not specific medical advice and does not replace information you receive from your health care provider. This is only a brief summary of general information. It does NOT include all information about conditions, illnesses, injuries, tests, procedures, treatments, therapies, discharge instructions or life-style choices that may apply to you. You must talk with your health care provider for complete information about your health and treatment options. This information should not be used to decide whether or not to accept your health care providers advice, instructions or recommendations. Only your health care provider has the knowledge and training to provide advice that is right for you.  Copyright   Copyright © 2021 [employer] and its affiliates and/or licensors. All rights reserved.  Patient Education       Weight Loss Diet   About this topic   There are many  ""trendy" weight loss diets that are popular today. Many of these diets can end up being more harmful than helpful. The healthiest way to lose weight is to burn more calories than you eat.  A weight loss diet should help you have a healthy view of eating. It is NOT healthy to stop eating to try and lose weight. A good diet plan will help you cut down your food intake and make healthy choices.  A healthy weight loss goal is 1 to 2 pounds (0.5 to 1 kg) per week. Reducing calories in your diet, burning calories through exercise, or both can help you lose weight. Combining a healthy diet with regular physical activity can help you get the best results.  To cut calories in your diet you can:  Switch from whole milk to 1% or skim milk.  Switch from regular cheese to low-fat or fat-free cheese.  Use healthier condiment choices:  Fat-free or low-fat sour cream or salad dressings  Spray butter  Diet syrups or jellies over regular  Try frozen yogurt as a dessert rather than eating ice cream.  Skip the chips. Snack on carrots, vegetables, or fruit. If chips are a favorite of yours, try the baked style and watch portion size.  Eat grilled, roasted, boiled, broiled, or baked meats. Avoid deep-frying. Choose skinless poultry, lean red meat, lean cuts of pork, and fish for good protein sources.  Try flavored no-calorie casanova. Do not drink soda and juices that have many calories.  Choose fruit instead of sweets.  General   Eating smaller meals more often may be helpful. This will keep you from overeating at your next meal. Also, eating meals slowly helps you feel full faster.  If eating 3 meals is a part of your lifestyle, choose more lean proteins and higher fiber foods to fill you up at each meal.  Do not skip meals. Most often if you skip a meal, you eat too much at the next meal.  Eat smaller portions. Use a smaller plate or bowl for meals, and when you are eating out, eat half and take the rest home.  Plan ahead. Plan your " meals and grocery list before going to the store. Planning will keep you from getting meals from restaurants.  Do not go to the grocery store hungry. You are more likely to buy snacks that are not good for you.  Portion out snacks. When you are having a snack, instead of grabbing the whole bag, portion a small amount out to give yourself a stopping point.  Drink water before and after your meals to help fill you up without the calories.  When eating starchy foods, choose whole-grain products. These have a lot of fiber which will make you feel full. Fiber also helps lower cholesterol and helps with bowel function.  If you need a helpful start, ask your doctor to send you to a dietitian for weight loss help.         What will the results be?   Losing excess weight will make your whole body healthier. You will have more energy for your daily activities and lower your risk for health problems.  What lifestyle changes are needed?   Stay active. Eating healthy is not always enough to lose weight. Burning calories by exercising is a big part of weight loss.  What foods are good to eat?   The key is to watch your portion sizes. It is best to choose foods that are lower in fat and calories.  Choose lean meats:  Boneless, skinless chicken breast  Pork loin  90% lean beef  Lean turkey meat  Fresh fish (not fried)  Choose low-fat dairy products:  1% or skim milk  Spray butter or margarine  Low-fat or fat-free cheese  Frozen yogurt or low-calorie ice cream  Choose fresh fruits, vegetables, beans and lentils, and whole wheat products more often.  Choose water to drink more often. Drink diet or no-calorie beverages when you want something other than water. Aim to get your calories from the foods you eat.  Choose smart snacks:  Fruits  Vegetables  Low-fat or nonfat yogurt  Low-fat or no-fat cheese, such as cottage cheese  Unsalted nuts  Hard-boiled egg  Hummus  Guacamole  Natural peanut butter  Popcorn with no butter ? use pepper,  garlic, or another spice to taste  Whole grain crackers  What foods should be limited or avoided?   Limit high-fat, high-sodium, and high-calorie foods like:  Fried foods  Processed meats  Whole-fat dairy products  Candy, cookies, chips, pastries  Sausage, astorga, any full-fat meats  Soda, juice  Beer, wine, and mixed drinks (alcohol)  Will there be any other care needed?   What do I do first before trying to lose weight?  Talk to your doctor and dietitian to see if you need to lose weight. Work with them to set your weight loss goals.  If you have a chronic illness, such as high blood sugar or high blood pressure, ask a doctor or dietitian what diet and exercise is right for you.  Ask your doctor about how much you are able to exercise and what type of exercise is good for you.  Helpful tips   Keep a food journal to help keep you on track.  Join a support group.  Tips for burning calories:  If your workplace is near your house, choose to walk or bike to work instead of driving.  Take 20-minute walks each day. Walk around during your lunch break. You will not only burn calories, but will raise your energy for the rest of the day.  Take the stairs over the elevator.  Join a gym or exercise class with a friend.  Try to exercise 30 minutes a day for overall health. Three 10-minute sessions work too. Aim for 60 to 90 minutes a day to lose weight.  Drink lots of water before, during, and after exercise.  Where can I learn more?   Academy of Nutrition and Dietetics  https://www.eatright.org/health/weight-loss/your-health-and-your-weight/back-to-basics-for-healthy-weight-loss   Centers for Disease Control and Prevention  https://www.cdc.gov/healthyweight/healthy_eating/index.html   Familydoctor.org  https://familydoctor.org/nutrition-weight-loss-need-know-fad-diets/   NHS  https://www.nhs.uk/live-well/healthy-weight/12-tips-to-help-you-lose-weight/?tabname=you-and-your-weight   Last Reviewed Date   2021-06-24  Consumer  Information Use and Disclaimer   This information is not specific medical advice and does not replace information you receive from your health care provider. This is only a brief summary of general information. It does NOT include all information about conditions, illnesses, injuries, tests, procedures, treatments, therapies, discharge instructions or life-style choices that may apply to you. You must talk with your health care provider for complete information about your health and treatment options. This information should not be used to decide whether or not to accept your health care providers advice, instructions or recommendations. Only your health care provider has the knowledge and training to provide advice that is right for you.  Copyright   Copyright © 2021 [employer] and its affiliates and/or licensors. All rights reserved.

## 2025-02-21 ENCOUNTER — LAB VISIT (OUTPATIENT)
Dept: LAB | Facility: HOSPITAL | Age: 80
End: 2025-02-21
Attending: INTERNAL MEDICINE
Payer: MEDICARE

## 2025-02-21 DIAGNOSIS — R73.03 PREDIABETES: ICD-10-CM

## 2025-02-21 DIAGNOSIS — E78.49 OTHER HYPERLIPIDEMIA: ICD-10-CM

## 2025-02-21 DIAGNOSIS — I12.9 BENIGN HYPERTENSION WITH CHRONIC KIDNEY DISEASE, STAGE III: ICD-10-CM

## 2025-02-21 DIAGNOSIS — N18.30 BENIGN HYPERTENSION WITH CHRONIC KIDNEY DISEASE, STAGE III: ICD-10-CM

## 2025-02-21 LAB
ALBUMIN SERPL BCP-MCNC: 3.6 G/DL (ref 3.5–5.2)
ALP SERPL-CCNC: 55 U/L (ref 40–150)
ALT SERPL W/O P-5'-P-CCNC: 10 U/L (ref 10–44)
ANION GAP SERPL CALC-SCNC: 8 MMOL/L (ref 8–16)
AST SERPL-CCNC: 49 U/L (ref 10–40)
BASOPHILS # BLD AUTO: 0.05 K/UL (ref 0–0.2)
BASOPHILS NFR BLD: 0.8 % (ref 0–1.9)
BILIRUB SERPL-MCNC: 0.3 MG/DL (ref 0.1–1)
BUN SERPL-MCNC: 31 MG/DL (ref 8–23)
CALCIUM SERPL-MCNC: 10.8 MG/DL (ref 8.7–10.5)
CHLORIDE SERPL-SCNC: 108 MMOL/L (ref 95–110)
CHOLEST SERPL-MCNC: 153 MG/DL (ref 120–199)
CHOLEST/HDLC SERPL: 2.6 {RATIO} (ref 2–5)
CO2 SERPL-SCNC: 23 MMOL/L (ref 23–29)
CREAT SERPL-MCNC: 0.9 MG/DL (ref 0.5–1.4)
DIFFERENTIAL METHOD BLD: ABNORMAL
EOSINOPHIL # BLD AUTO: 0.1 K/UL (ref 0–0.5)
EOSINOPHIL NFR BLD: 2.1 % (ref 0–8)
ERYTHROCYTE [DISTWIDTH] IN BLOOD BY AUTOMATED COUNT: 13.2 % (ref 11.5–14.5)
EST. GFR  (NO RACE VARIABLE): >60 ML/MIN/1.73 M^2
ESTIMATED AVG GLUCOSE: 111 MG/DL (ref 68–131)
GLUCOSE SERPL-MCNC: 86 MG/DL (ref 70–110)
HBA1C MFR BLD: 5.5 % (ref 4–5.6)
HCT VFR BLD AUTO: 37.4 % (ref 37–48.5)
HDLC SERPL-MCNC: 58 MG/DL (ref 40–75)
HDLC SERPL: 37.9 % (ref 20–50)
HGB BLD-MCNC: 11.4 G/DL (ref 12–16)
IMM GRANULOCYTES # BLD AUTO: 0.02 K/UL (ref 0–0.04)
IMM GRANULOCYTES NFR BLD AUTO: 0.3 % (ref 0–0.5)
LDLC SERPL CALC-MCNC: 80 MG/DL (ref 63–159)
LYMPHOCYTES # BLD AUTO: 1.9 K/UL (ref 1–4.8)
LYMPHOCYTES NFR BLD: 29.6 % (ref 18–48)
MCH RBC QN AUTO: 30.7 PG (ref 27–31)
MCHC RBC AUTO-ENTMCNC: 30.5 G/DL (ref 32–36)
MCV RBC AUTO: 101 FL (ref 82–98)
MONOCYTES # BLD AUTO: 0.5 K/UL (ref 0.3–1)
MONOCYTES NFR BLD: 7.2 % (ref 4–15)
NEUTROPHILS # BLD AUTO: 3.8 K/UL (ref 1.8–7.7)
NEUTROPHILS NFR BLD: 60 % (ref 38–73)
NONHDLC SERPL-MCNC: 95 MG/DL
NRBC BLD-RTO: 0 /100 WBC
PLATELET # BLD AUTO: 219 K/UL (ref 150–450)
PMV BLD AUTO: 11.4 FL (ref 9.2–12.9)
POTASSIUM SERPL-SCNC: 5.6 MMOL/L (ref 3.5–5.1)
PROT SERPL-MCNC: 7.9 G/DL (ref 6–8.4)
RBC # BLD AUTO: 3.71 M/UL (ref 4–5.4)
SODIUM SERPL-SCNC: 139 MMOL/L (ref 136–145)
TRIGL SERPL-MCNC: 75 MG/DL (ref 30–150)
WBC # BLD AUTO: 6.26 K/UL (ref 3.9–12.7)

## 2025-02-21 PROCEDURE — 83036 HEMOGLOBIN GLYCOSYLATED A1C: CPT | Performed by: INTERNAL MEDICINE

## 2025-02-21 PROCEDURE — 80061 LIPID PANEL: CPT | Performed by: INTERNAL MEDICINE

## 2025-02-21 PROCEDURE — 85025 COMPLETE CBC W/AUTO DIFF WBC: CPT | Performed by: INTERNAL MEDICINE

## 2025-02-21 PROCEDURE — 36415 COLL VENOUS BLD VENIPUNCTURE: CPT | Mod: PO | Performed by: INTERNAL MEDICINE

## 2025-02-21 PROCEDURE — 80053 COMPREHEN METABOLIC PANEL: CPT | Performed by: INTERNAL MEDICINE

## 2025-02-26 ENCOUNTER — OFFICE VISIT (OUTPATIENT)
Dept: FAMILY MEDICINE | Facility: CLINIC | Age: 80
End: 2025-02-26
Payer: MEDICARE

## 2025-02-26 ENCOUNTER — HOSPITAL ENCOUNTER (EMERGENCY)
Facility: HOSPITAL | Age: 80
Discharge: HOME | End: 2025-02-27
Attending: EMERGENCY MEDICINE
Payer: MEDICARE

## 2025-02-26 ENCOUNTER — PATIENT OUTREACH (OUTPATIENT)
Dept: ADMINISTRATIVE | Facility: OTHER | Age: 80
End: 2025-02-26
Payer: MEDICARE

## 2025-02-26 VITALS
OXYGEN SATURATION: 97 % | BODY MASS INDEX: 37.34 KG/M2 | WEIGHT: 197.75 LBS | HEART RATE: 72 BPM | DIASTOLIC BLOOD PRESSURE: 60 MMHG | TEMPERATURE: 98 F | HEIGHT: 61 IN | SYSTOLIC BLOOD PRESSURE: 112 MMHG

## 2025-02-26 DIAGNOSIS — N18.30 BENIGN HYPERTENSION WITH CHRONIC KIDNEY DISEASE, STAGE III: Primary | ICD-10-CM

## 2025-02-26 DIAGNOSIS — E78.49 OTHER HYPERLIPIDEMIA: ICD-10-CM

## 2025-02-26 DIAGNOSIS — R29.818 ACUTE FOCAL NEUROLOGICAL DEFICIT: ICD-10-CM

## 2025-02-26 DIAGNOSIS — H49.01 3RD NERVE PALSY, COMPLETE, RIGHT: Primary | ICD-10-CM

## 2025-02-26 DIAGNOSIS — H02.401 PTOSIS OF RIGHT EYELID: ICD-10-CM

## 2025-02-26 DIAGNOSIS — Z59.12 INADEQUATE HOUSING UTILITIES: ICD-10-CM

## 2025-02-26 DIAGNOSIS — E87.5 HYPERKALEMIA: ICD-10-CM

## 2025-02-26 DIAGNOSIS — R73.03 PREDIABETES: ICD-10-CM

## 2025-02-26 DIAGNOSIS — I12.9 BENIGN HYPERTENSION WITH CHRONIC KIDNEY DISEASE, STAGE III: Primary | ICD-10-CM

## 2025-02-26 DIAGNOSIS — I70.0 ATHEROSCLEROSIS OF AORTA: ICD-10-CM

## 2025-02-26 DIAGNOSIS — Z59.819 HOUSING INSTABILITY: ICD-10-CM

## 2025-02-26 DIAGNOSIS — E66.01 SEVERE OBESITY (BMI 35.0-39.9) WITH COMORBIDITY: ICD-10-CM

## 2025-02-26 DIAGNOSIS — N18.31 STAGE 3A CHRONIC KIDNEY DISEASE: ICD-10-CM

## 2025-02-26 DIAGNOSIS — E83.52 SERUM CALCIUM ELEVATED: ICD-10-CM

## 2025-02-26 DIAGNOSIS — H53.2 DIPLOPIA: ICD-10-CM

## 2025-02-26 DIAGNOSIS — R79.89 ELEVATED LFTS: ICD-10-CM

## 2025-02-26 LAB
ALBUMIN SERPL BCP-MCNC: 3.8 G/DL (ref 3.5–5.2)
ALP SERPL-CCNC: 59 U/L (ref 40–150)
ALT SERPL W/O P-5'-P-CCNC: 11 U/L (ref 10–44)
ANION GAP SERPL CALC-SCNC: 10 MMOL/L (ref 8–16)
AST SERPL-CCNC: 27 U/L (ref 10–40)
BASOPHILS # BLD AUTO: 0.06 K/UL (ref 0–0.2)
BASOPHILS NFR BLD: 0.7 % (ref 0–1.9)
BILIRUB SERPL-MCNC: 0.5 MG/DL (ref 0.1–1)
BUN SERPL-MCNC: 30 MG/DL (ref 8–23)
CALCIUM SERPL-MCNC: 10.4 MG/DL (ref 8.7–10.5)
CHLORIDE SERPL-SCNC: 105 MMOL/L (ref 95–110)
CHOLEST SERPL-MCNC: 163 MG/DL (ref 120–199)
CHOLEST/HDLC SERPL: 2.5 {RATIO} (ref 2–5)
CO2 SERPL-SCNC: 25 MMOL/L (ref 23–29)
CREAT SERPL-MCNC: 0.9 MG/DL (ref 0.5–1.4)
DIFFERENTIAL METHOD BLD: ABNORMAL
EOSINOPHIL # BLD AUTO: 0.1 K/UL (ref 0–0.5)
EOSINOPHIL NFR BLD: 1.4 % (ref 0–8)
ERYTHROCYTE [DISTWIDTH] IN BLOOD BY AUTOMATED COUNT: 13.2 % (ref 11.5–14.5)
EST. GFR  (NO RACE VARIABLE): >60 ML/MIN/1.73 M^2
GLUCOSE SERPL-MCNC: 90 MG/DL (ref 70–110)
HCT VFR BLD AUTO: 37.1 % (ref 37–48.5)
HCV AB SERPL QL IA: NORMAL
HDLC SERPL-MCNC: 66 MG/DL (ref 40–75)
HDLC SERPL: 40.5 % (ref 20–50)
HGB BLD-MCNC: 11.8 G/DL (ref 12–16)
HIV 1+2 AB+HIV1 P24 AG SERPL QL IA: NORMAL
IMM GRANULOCYTES # BLD AUTO: 0.04 K/UL (ref 0–0.04)
IMM GRANULOCYTES NFR BLD AUTO: 0.5 % (ref 0–0.5)
INR PPP: 1 (ref 0.8–1.2)
LDLC SERPL CALC-MCNC: 82 MG/DL (ref 63–159)
LYMPHOCYTES # BLD AUTO: 1.8 K/UL (ref 1–4.8)
LYMPHOCYTES NFR BLD: 21 % (ref 18–48)
MCH RBC QN AUTO: 30.5 PG (ref 27–31)
MCHC RBC AUTO-ENTMCNC: 31.8 G/DL (ref 32–36)
MCV RBC AUTO: 96 FL (ref 82–98)
MONOCYTES # BLD AUTO: 0.5 K/UL (ref 0.3–1)
MONOCYTES NFR BLD: 5.8 % (ref 4–15)
NEUTROPHILS # BLD AUTO: 6 K/UL (ref 1.8–7.7)
NEUTROPHILS NFR BLD: 70.6 % (ref 38–73)
NONHDLC SERPL-MCNC: 97 MG/DL
NRBC BLD-RTO: 0 /100 WBC
OHS QRS DURATION: 80 MS
OHS QTC CALCULATION: 435 MS
PLATELET # BLD AUTO: 241 K/UL (ref 150–450)
PMV BLD AUTO: 10.6 FL (ref 9.2–12.9)
POTASSIUM SERPL-SCNC: 4.2 MMOL/L (ref 3.5–5.1)
PROT SERPL-MCNC: 8.1 G/DL (ref 6–8.4)
PROTHROMBIN TIME: 11.2 SEC (ref 9–12.5)
RBC # BLD AUTO: 3.87 M/UL (ref 4–5.4)
SARS-COV-2 RDRP RESP QL NAA+PROBE: NEGATIVE
SODIUM SERPL-SCNC: 140 MMOL/L (ref 136–145)
TRIGL SERPL-MCNC: 75 MG/DL (ref 30–150)
TSH SERPL DL<=0.005 MIU/L-ACNC: 0.77 UIU/ML (ref 0.4–4)
WBC # BLD AUTO: 8.56 K/UL (ref 3.9–12.7)

## 2025-02-26 PROCEDURE — 80053 COMPREHEN METABOLIC PANEL: CPT | Performed by: EMERGENCY MEDICINE

## 2025-02-26 PROCEDURE — 83036 HEMOGLOBIN GLYCOSYLATED A1C: CPT | Performed by: PHYSICIAN ASSISTANT

## 2025-02-26 PROCEDURE — 85610 PROTHROMBIN TIME: CPT | Performed by: EMERGENCY MEDICINE

## 2025-02-26 PROCEDURE — 25000003 PHARM REV CODE 250: Performed by: EMERGENCY MEDICINE

## 2025-02-26 PROCEDURE — 93005 ELECTROCARDIOGRAM TRACING: CPT

## 2025-02-26 PROCEDURE — 84443 ASSAY THYROID STIM HORMONE: CPT | Performed by: EMERGENCY MEDICINE

## 2025-02-26 PROCEDURE — 99285 EMERGENCY DEPT VISIT HI MDM: CPT | Mod: 25,27

## 2025-02-26 PROCEDURE — 80061 LIPID PANEL: CPT | Performed by: EMERGENCY MEDICINE

## 2025-02-26 PROCEDURE — 93010 ELECTROCARDIOGRAM REPORT: CPT | Mod: ,,, | Performed by: INTERNAL MEDICINE

## 2025-02-26 PROCEDURE — 99999 PR PBB SHADOW E&M-EST. PATIENT-LVL III: CPT | Mod: PBBFAC,,, | Performed by: INTERNAL MEDICINE

## 2025-02-26 PROCEDURE — G0136 PR ADMINISTRATION, SOCIAL DETERMINANT ASSESSMNT, 5-15 MIN: HCPCS | Mod: PBBFAC,PO | Performed by: INTERNAL MEDICINE

## 2025-02-26 PROCEDURE — 87389 HIV-1 AG W/HIV-1&-2 AB AG IA: CPT | Performed by: PHYSICIAN ASSISTANT

## 2025-02-26 PROCEDURE — 87635 SARS-COV-2 COVID-19 AMP PRB: CPT | Performed by: EMERGENCY MEDICINE

## 2025-02-26 PROCEDURE — 99213 OFFICE O/P EST LOW 20 MIN: CPT | Mod: PBBFAC,PO | Performed by: INTERNAL MEDICINE

## 2025-02-26 PROCEDURE — 85025 COMPLETE CBC W/AUTO DIFF WBC: CPT | Performed by: EMERGENCY MEDICINE

## 2025-02-26 PROCEDURE — 86803 HEPATITIS C AB TEST: CPT | Performed by: PHYSICIAN ASSISTANT

## 2025-02-26 RX ORDER — PROPARACAINE HYDROCHLORIDE 5 MG/ML
1 SOLUTION/ DROPS OPHTHALMIC
Status: COMPLETED | OUTPATIENT
Start: 2025-02-26 | End: 2025-02-26

## 2025-02-26 RX ADMIN — PROPARACAINE HYDROCHLORIDE 1 DROP: 5 SOLUTION/ DROPS OPHTHALMIC at 11:02

## 2025-02-26 RX ADMIN — FLUORESCEIN SODIUM 1 EACH: 1 STRIP OPHTHALMIC at 11:02

## 2025-02-26 SDOH — SOCIAL DETERMINANTS OF HEALTH (SDOH): HOUSING INSTABILITY UNSPECIFIED: Z59.819

## 2025-02-26 SDOH — SOCIAL DETERMINANTS OF HEALTH (SDOH): INADEQUATE HOUSING UTILITIES: Z59.12

## 2025-02-26 NOTE — PROGRESS NOTES
CHW - Initial Contact    This Community Health Worker completed OR updated the Social Determinant of Health questionnaire with patient via telephone today.    Pt identified barriers of most importance are: patient has housing instability and issues with utility payment   Referrals to community agencies completed with patient/caregiver consent outside of [facility] include:  [email or web address]  Referrals were put through [facility] - no: [email or web address]  Support and Services: no support at this time  Other information discussed the patient needs / wants help with: Verified SDOH with patient. Patient has housing instability and issues with utility payment. CHW will follow up in two weeks to check on referral placed today and patient's future needs.   Follow up required: yes   No future outreach task assigned 3/14/2025

## 2025-02-26 NOTE — PROGRESS NOTES
CHIEF COMPLAINT:   Chief Complaint   Patient presents with    Results     Review Lab Results          HISTORY OF PRESENT ILLNESS:  Francoise De Jesus is a 79 y.o. female who presents to the clinic today for Results (Review Lab Results)  .             Patient reports feeling well overall. She mentions double vision in her right eye, seeing images stacked vertically rather than side by side. This visual disturbance causes significant discomfort, requiring her to close her right eye to see clearly. Patient expresses a desire to see Dr. Potter, a retina specialist, earlier than her scheduled appointment on the 12th of next month to address this issue.    Patient reports housing insecurity, stating she is behind on her payments. She currently receives commodities assistance but may require additional support for housing and utilities.    Patient denies any worrisome symptoms or feeling unwell.           Subjective    PAST MEDICAL HISTORY:  Past Medical History:   Diagnosis Date    Arthritis     Atherosclerosis of aorta     noted on CXR 11/17/2014    Breast cancer 2011    stage I right breast cancer    Disorder of kidney and ureter     History of peptic ulcer     Hyperlipidemia     Hypertension     Macular degeneration 03/13/2017    Morbid obesity     Prediabetes        PAST SURGICAL HISTORY:  Past Surgical History:   Procedure Laterality Date    BREAST BIOPSY Left     BREAST LUMPECTOMY Right 11/2011    lumpectomy and SN biopsy with radiation    CATARACT EXTRACTION W/  INTRAOCULAR LENS IMPLANT Left 07/22/2017    Dr. Okeefe    CATARACT EXTRACTION W/  INTRAOCULAR LENS IMPLANT Right 07/06/2017    Dr. Okeefe    CHOLECYSTECTOMY      EYE SURGERY      Lt cataract       SOCIAL HISTORY:  Social History[1]    FAMILY HISTORY:  Family History   Problem Relation Name Age of Onset    Stomach cancer Mother      Lung cancer Father      Hypertension Father      Breast cancer Sister Renita 58    Cataracts Sister Renita     Macular  degeneration Sister Renita     Cancer Sister Renita         breast CA    Cataracts Sister Linnea     Macular degeneration Sister Linnea     Breast cancer Sister Linnea 72    Cancer Sister Linnea         breast CA     Diabetes Sister Linnea     Hypertension Sister Linnea     Macular degeneration Sister Blanca     Macular degeneration Sister Amita     Glaucoma Brother Yoni     Macular degeneration Brother Yoni         wet    Hypertension Brother Yoni     Macular degeneration Brother Chris         dry    Hypertension Brother Chris     Hypertension Brother Balwinder     Diabetes Brother Nigel     Hypertension Brother Nigel     Prostate cancer Brother Nigel     Cancer Brother Nigel 80        prostate     Hypertension Brother Gera     Hyperlipidemia Brother Gera     Leukemia Brother Gera     No Known Problems Son      No Known Problems Son      Ovarian cancer Neg Hx      Amblyopia Neg Hx      Blindness Neg Hx      Retinal detachment Neg Hx      Strabismus Neg Hx      Stroke Neg Hx      Thyroid disease Neg Hx         ALLERGIES AND MEDICATIONS: updated and reviewed.  Review of patient's allergies indicates:   Allergen Reactions    Medrol [methylprednisolone]      Depression and tearful    Penicillins Rash     Medication List with Changes/Refills   Current Medications    ACETAMINOPHEN (TYLENOL) 500 MG TABLET    Take 500 mg by mouth every 6 (six) hours as needed for Pain.    ANTIOX#10/OM3/DHA/EPA/LUT/ZEAX (I-CAPS ORAL)    Take 1 tablet by mouth 2 (two) times a day.    ASPIRIN (ECOTRIN) 81 MG EC TABLET    Take 1 tablet (81 mg total) by mouth once daily.    FENOFIBRATE (TRICOR) 145 MG TABLET    Take 1 tablet (145 mg total) by mouth once daily. 1 Tablet Oral Every evening    HYDROCHLOROTHIAZIDE (HYDRODIURIL) 25 MG TABLET    Take 1 tablet (25 mg total) by mouth once daily. 1 Tablet Oral Every morning    LISINOPRIL (PRINIVIL,ZESTRIL) 40 MG TABLET    Take 1 tablet (40 mg total) by mouth every morning.    OMEPRAZOLE (PRILOSEC) 40 MG CAPSULE    " TAKE ONE CAPSULE BY MOUTH EVERY DAY AS NEEDED FOR HEARTBURN    PRAVASTATIN (PRAVACHOL) 10 MG TABLET    Take 1 tablet (10 mg total) by mouth once daily.       CARE TEAM:  Patient Care Team:  Lulu Auguste MD as PCP - General (Internal Medicine)  Kyler Hickman MD as Consulting Physician (Hematology and Oncology)  Wang Agarwal OD as Consulting Physician (Optometry)  Kyler Hickman MD as Consulting Physician (Hematology and Oncology)  Enrique Potter MD as Consulting Physician (Ophthalmology)  Fannie Khan MA as Community Health Worker       REVIEW OF SYSTEMS:  Review of Systems   Constitutional:  Negative for chills, fever and unexpected weight change.   Eyes:  Positive for visual disturbance.   Respiratory:  Negative for cough and shortness of breath.    Cardiovascular:  Negative for chest pain and leg swelling.   Gastrointestinal:  Negative for abdominal pain.   Genitourinary:  Negative for dysuria.              Objective    PHYSICAL EXAMINATION/VITALS:  Vitals:    02/26/25 1048   BP: 112/60   BP Location: Right arm   Patient Position: Sitting   Pulse: 72   Temp: 97.8 °F (36.6 °C)   TempSrc: Oral   SpO2: 97%   Weight: 89.7 kg (197 lb 12 oz)   Height: 5' 1" (1.549 m)       Body mass index is 37.37 kg/m².      General appearance - alert, well appearing, and in no distress, obese, exam limited as patient did not get onto the examination table  Psychiatric - alert, oriented to person, place, and time, normal behavior, speech, dress, motor activity and thought process  Eyes - pupils equal and reactive, extraocular eye movements intact, sclera anicteric  Neck - supple, no significant adenopathy, carotids upstroke normal bilaterally, no bruits  Lymphatics - no palpable cervical lymphadenopathy  Chest - clear to auscultation, no wheezes, rales or rhonchi, symmetric air entry  Heart - normal rate and regular rhythm  Neurological - alert, normal speech, no focal findings; cranial nerves II " through XII intact  Musculoskeletal - not examined  Extremities - no pedal edema noted, varicose veins noted - moderate bilateral  Skin - normal coloration, no suspicious skin lesions        LABS:  Results for orders placed or performed in visit on 02/21/25   Hemoglobin A1C    Collection Time: 02/21/25 11:20 AM   Result Value Ref Range    Hemoglobin A1C 5.5 4.0 - 5.6 %    Estimated Avg Glucose 111 68 - 131 mg/dL   Lipid Panel    Collection Time: 02/21/25 11:20 AM   Result Value Ref Range    Cholesterol 153 120 - 199 mg/dL    Triglycerides 75 30 - 150 mg/dL    HDL 58 40 - 75 mg/dL    LDL Cholesterol 80.0 63.0 - 159.0 mg/dL    HDL/Cholesterol Ratio 37.9 20.0 - 50.0 %    Total Cholesterol/HDL Ratio 2.6 2.0 - 5.0    Non-HDL Cholesterol 95 mg/dL   Comprehensive Metabolic Panel    Collection Time: 02/21/25 11:20 AM   Result Value Ref Range    Sodium 139 136 - 145 mmol/L    Potassium 5.6 (H) 3.5 - 5.1 mmol/L    Chloride 108 95 - 110 mmol/L    CO2 23 23 - 29 mmol/L    Glucose 86 70 - 110 mg/dL    BUN 31 (H) 8 - 23 mg/dL    Creatinine 0.9 0.5 - 1.4 mg/dL    Calcium 10.8 (H) 8.7 - 10.5 mg/dL    Total Protein 7.9 6.0 - 8.4 g/dL    Albumin 3.6 3.5 - 5.2 g/dL    Total Bilirubin 0.3 0.1 - 1.0 mg/dL    Alkaline Phosphatase 55 40 - 150 U/L    AST 49 (H) 10 - 40 U/L    ALT 10 10 - 44 U/L    eGFR >60.0 >60 mL/min/1.73 m^2    Anion Gap 8 8 - 16 mmol/L   CBC Auto Differential    Collection Time: 02/21/25 11:20 AM   Result Value Ref Range    WBC 6.26 3.90 - 12.70 K/uL    RBC 3.71 (L) 4.00 - 5.40 M/uL    Hemoglobin 11.4 (L) 12.0 - 16.0 g/dL    Hematocrit 37.4 37.0 - 48.5 %     (H) 82 - 98 fL    MCH 30.7 27.0 - 31.0 pg    MCHC 30.5 (L) 32.0 - 36.0 g/dL    RDW 13.2 11.5 - 14.5 %    Platelets 219 150 - 450 K/uL    MPV 11.4 9.2 - 12.9 fL    Immature Granulocytes 0.3 0.0 - 0.5 %    Gran # (ANC) 3.8 1.8 - 7.7 K/uL    Immature Grans (Abs) 0.02 0.00 - 0.04 K/uL    Lymph # 1.9 1.0 - 4.8 K/uL    Mono # 0.5 0.3 - 1.0 K/uL    Eos # 0.1 0.0 -  0.5 K/uL    Baso # 0.05 0.00 - 0.20 K/uL    nRBC 0 0 /100 WBC    Gran % 60.0 38.0 - 73.0 %    Lymph % 29.6 18.0 - 48.0 %    Mono % 7.2 4.0 - 15.0 %    Eosinophil % 2.1 0.0 - 8.0 %    Basophil % 0.8 0.0 - 1.9 %    Differential Method Automated                 ASSESSMENT AND PLAN:  1. Benign hypertension with chronic kidney disease, stage III  BP Readings from Last 1 Encounters:   02/26/25 112/60      Discussed sodium restriction, maintaining ideal body weight and regular exercise program as physiologic means to achieve blood pressure control. The patient will strive towards this.   The current medical regimen is effective;  continue present plan and medications. Recommended patient to check home readings to monitor and see me for followup as scheduled or sooner as needed.   Patient was educated that both decongestant and anti-inflammatory medication may raise blood pressure.  Stable decreased kidney function. Observe. Patient counseled to avoid/minimize the use of anti-inflammatory  Medication. Discussed to stay well hydrated. Also discussed with patient that good control of blood pressure and/or diabetes, if present, will help to prevent progression.  The patient is not active on the digital hypertension program.    2. Stage 3a chronic kidney disease  eGFR   Date Value Ref Range Status   02/21/2025 >60.0 >60 mL/min/1.73 m^2 Final   03/13/2023 57.7 (A) >60 mL/min/1.73 m^2 Final     Stable decreased kidney function. Observe. Patient counseled to avoid/minimize the use of anti-inflammatory medication. Discussed to stay well hydrated. Also discussed with patient that good control of blood pressure and/or diabetes, if present, will help to prevent progression.    3. Other hyperlipidemia  Lab Results   Component Value Date    CHOL 153 02/21/2025     Lab Results   Component Value Date    HDL 58 02/21/2025     Lab Results   Component Value Date    LDLCALC 80.0 02/21/2025     Lab Results   Component Value Date    TRIG 75  02/21/2025     Lab Results   Component Value Date    LDLCALC 80.0 02/21/2025     We discussed low fat diet and regular exercise.The current medical regimen is effective;  continue present plan and medications.     4. Prediabetes  Lab Results   Component Value Date    HGBA1C 5.5 02/21/2025     Stable. We discussed low sugar/low carbohydrate diet and regular exercise to prevent progression. No need for prescription medication at this time.  Check A1c yearly.    5. Severe obesity (BMI 35.0-39.9) with comorbidity  BMI Readings from Last 3 Encounters:   02/26/25 37.37 kg/m²   02/20/25 37.49 kg/m²   10/23/24 35.91 kg/m²     The patient is asked to make an attempt to improve diet and exercise patterns to aid in medical management of this problem.    6. Atherosclerosis of aorta  Patient with Atherosclerosis of the Aorta.  Stable/asymptomatic. Currently stable on lipid lowering medication and blood pressure monitoring.  Overview:  noted on CXR 11/17/2014      7. Serum calcium elevated  She is asymptomatic.  We discussed the possibility of hyperparathyroidism.  I will check some additional labs.  Bone density test in the past have been normal.  -     Calcium, Ionized; Future; Expected date: 02/26/2025  -     PTH, Intact; Future; Expected date: 02/26/2025    8. Elevated LFTs  She is asymptomatic.  She denies taking any over-the-counter herbal medication.  She only takes a vitamin supplement for her eyes.  She rarely takes Tylenol as needed for pain.  No alcohol use.  Recheck liver enzymes next week.  If still elevated consider ultrasound of liver for further evaluation.  -     Comprehensive Metabolic Panel; Future; Expected date: 02/26/2025    9. Hyperkalemia  She denies eating a lot of foods high in potassium.  Kidney function is high CKD 3 a.  Unsure of why her potassium was so elevated.  I will recheck another level next week and address results accordingly.  In the meantime, she will avoid foods high potassium.  If results  remain elevated we may need to consider changing blood pressure medication to exclude ACE inhibitor/ARB.  -     Comprehensive Metabolic Panel; Future; Expected date: 02/26/2025    10. Housing instability  Refer to community case management  -     [facility] ENROLLMENT  -     Cancel: [facility] PRO[facility] ENROLLMENT    11. Inadequate housing utilities  Referred to Community case management  -     [facility] ENROLLMENT  -     Cancel: [facility] PROGRAM ENROLLMENT      Borderline anemia with minimally elevated MCV.    I recommended she start a B12 complex vitamin daily.      Assessment & Plan    PATIENT EDUCATION:  Discussed potential causes of elevated potassium, calcium, and liver enzymes  Explained parathyroid glands' role in calcium regulation and potential consequences of hyperparathyroidism    ACTION ITEMS/LIFESTYLE:  Patient to take sublingual B complex vitamin  Patient to temporarily minimize intake of elevated-potassium foods (e.g., tomatoes, oranges, potatoes, beans)          Orders Placed This Encounter   Procedures    [facility] ENROLLMENT    Comprehensive Metabolic Panel    Calcium, Ionized    PTH, Intact      FOLLOW UP: No follow-ups on file. or sooner as needed.    This note was generated with the assistance of ambient listening technology. Verbal consent was obtained by the patient and accompanying visitor(s) for the recording of patient appointment to facilitate this note. I attest to having reviewed and edited the generated note for accuracy, though some syntax or spelling errors may persist. Please contact the author of this note for any clarification.                SDoH Intervention:     As of today, the patient has reported risk in the following areas:    Housing Stability: High Risk (2/20/2025)    Housing Stability Vital Sign     Unable to Pay for Housing in the Last Year: Yes     Number of Times Moved in the Last Year: Not on file     Homeless  in the Last Year: No     Utilities: At Risk (2/20/2025)    [facility] Utilities     Threatened with loss of utilities: Yes     Enrollment order placed to Community Health Worker.               [1]   Social History  Socioeconomic History    Marital status:     Number of children: 2   Occupational History    Occupation:    Tobacco Use    Smoking status: Never    Smokeless tobacco: Never   Substance and Sexual Activity    Alcohol use: No    Drug use: No    Sexual activity: Not Currently     Partners: Male     Social Drivers of Health     Financial Resource Strain: Medium Risk (2/20/2025)    Overall Financial Resource Strain (CARDIA)     Difficulty of Paying Living Expenses: Somewhat hard   Food Insecurity: No Food Insecurity (2/20/2025)    Hunger Vital Sign     Worried About Running Out of Food in the Last Year: Never true     Ran Out of Food in the Last Year: Never true   Transportation Needs: No Transportation Needs (2/20/2025)    PRAPARE - Transportation     Lack of Transportation (Medical): No     Lack of Transportation (Non-Medical): No   Physical Activity: Inactive (2/20/2025)    Exercise Vital Sign     Days of Exercise per Week: 0 days     Minutes of Exercise per Session: 0 min   Stress: Stress Concern Present (2/20/2025)    [employer] [place] [employer] - Occupational Stress Questionnaire     Feeling of Stress : To some extent   Housing Stability: High Risk (2/20/2025)    Housing Stability Vital Sign     Unable to Pay for Housing in the Last Year: Yes     Homeless in the Last Year: No

## 2025-02-26 NOTE — FIRST PROVIDER EVALUATION
Medical screening examination initiated.  I have conducted a focused provider triage encounter, findings are as follows:    Brief history of present illness:  right eye drooping since 11 am yesterday, sent in by ophthalmology.  Vertcal diplopia.  Eye doctor thinks it's a stroke.     There were no vitals filed for this visit.    Pertinent physical exam:  facial droop    Brief workup plan:  stroke workup    Preliminary workup initiated; this workup will be continued and followed by the physician or advanced practice provider that is assigned to the patient when roomed.

## 2025-02-27 ENCOUNTER — TELEPHONE (OUTPATIENT)
Dept: OPHTHALMOLOGY | Facility: CLINIC | Age: 80
End: 2025-02-27
Payer: MEDICARE

## 2025-02-27 VITALS
HEIGHT: 62 IN | WEIGHT: 197 LBS | DIASTOLIC BLOOD PRESSURE: 107 MMHG | TEMPERATURE: 98 F | OXYGEN SATURATION: 98 % | RESPIRATION RATE: 18 BRPM | BODY MASS INDEX: 36.25 KG/M2 | SYSTOLIC BLOOD PRESSURE: 162 MMHG | HEART RATE: 61 BPM

## 2025-02-27 PROBLEM — D32.9 MENINGIOMA: Status: ACTIVE | Noted: 2025-02-27

## 2025-02-27 LAB
ESTIMATED AVG GLUCOSE: 114 MG/DL (ref 68–131)
HBA1C MFR BLD: 5.6 % (ref 4–5.6)

## 2025-02-27 PROCEDURE — 25500020 PHARM REV CODE 255: Performed by: EMERGENCY MEDICINE

## 2025-02-27 RX ADMIN — IOHEXOL 75 ML: 350 INJECTION, SOLUTION INTRAVENOUS at 02:02

## 2025-02-27 NOTE — TELEPHONE ENCOUNTER
Calling to check on patient, contacted Ms. Edwards and her son at phone number's   [phone], [phone] and her son at [phone] all three numbers got to voicemail and there is no option to leave a voicemail. Will try again later.

## 2025-02-27 NOTE — ED PROVIDER NOTES
Emergency Department Provider Note    Francoise De Jesus   79 y.o. female   [id number]      2/26/2025       History     This history was obtained from the patient without limitations.  She was driven to the ED by her son who is at the bedside.      She is an 79-year-old with the below past medical history.  She complains of drooping of the right upper eyelid and double vision that resolves with covering the right eye.  These symptom was present when she woke up yesterday morning.  She denies ocular pain, headache, lightheadedness, dizziness, chest pain, palpitations, nausea, and vomiting.  She denies weakness and numbness to her extremities.         Past Medical History:   Diagnosis Date    Arthritis     Atherosclerosis of aorta     noted on CXR 11/17/2014    Breast cancer 2011    stage I right breast cancer    Disorder of kidney and ureter     History of peptic ulcer     Hyperlipidemia     Hypertension     Macular degeneration 03/13/2017    Morbid obesity     Prediabetes       Past Surgical History:   Procedure Laterality Date    BREAST BIOPSY Left     BREAST LUMPECTOMY Right 11/2011    lumpectomy and SN biopsy with radiation    CATARACT EXTRACTION W/  INTRAOCULAR LENS IMPLANT Left 07/22/2017    Dr. Okeefe    CATARACT EXTRACTION W/  INTRAOCULAR LENS IMPLANT Right 07/06/2017    Dr. Okeefe    CHOLECYSTECTOMY      EYE SURGERY      Lt cataract      Family History   Problem Relation Name Age of Onset    Stomach cancer Mother      Lung cancer Father      Hypertension Father      Breast cancer Sister Renita 58    Cataracts Sister Renita     Macular degeneration Sister Renita     Cancer Sister Renita         breast CA    Cataracts Sister Linnea     Macular degeneration Sister Linnea     Breast cancer Sister Linnea 72    Cancer Sister Linnea         breast CA     Diabetes Sister Linnea     Hypertension Sister Linnea     Macular degeneration Sister Blanca     Macular degeneration Sister Amita     Glaucoma Brother Yoni     Macular degeneration  Brother Yoni         wet    Hypertension Brother Yoni     Macular degeneration Brother Chris         dry    Hypertension Brother Chris     Hypertension Brother Balwinder     Diabetes Brother Nigel     Hypertension Brother Nigel     Prostate cancer Brother Nigel     Cancer Brother Nigel 80        prostate     Hypertension Brother Gera     Hyperlipidemia Brother Gera     Leukemia Brother Gera     No Known Problems Son      No Known Problems Son      Ovarian cancer Neg Hx      Amblyopia Neg Hx      Blindness Neg Hx      Retinal detachment Neg Hx      Strabismus Neg Hx      Stroke Neg Hx      Thyroid disease Neg Hx        Social History     Socioeconomic History    Marital status:     Number of children: 2   Occupational History    Occupation:    Tobacco Use    Smoking status: Never    Smokeless tobacco: Never   Substance and Sexual Activity    Alcohol use: No    Drug use: No    Sexual activity: Not Currently     Partners: Male     Social Drivers of Health     Financial Resource Strain: Medium Risk (2/20/2025)    Overall Financial Resource Strain (CARDIA)     Difficulty of Paying Living Expenses: Somewhat hard   Food Insecurity: No Food Insecurity (2/20/2025)    Hunger Vital Sign     Worried About Running Out of Food in the Last Year: Never true     Ran Out of Food in the Last Year: Never true   Transportation Needs: No Transportation Needs (2/20/2025)    PRAPARE - Transportation     Lack of Transportation (Medical): No     Lack of Transportation (Non-Medical): No   Physical Activity: Inactive (2/20/2025)    Exercise Vital Sign     Days of Exercise per Week: 0 days     Minutes of Exercise per Session: 0 min   Stress: Stress Concern Present (2/20/2025)    [employer] - Occupational Stress Questionnaire     Feeling of Stress : To some extent   Housing Stability: High Risk (2/20/2025)    Housing Stability Vital Sign     Unable to Pay for Housing in the Last Year: Yes      Homeless in the Last Year: No      Review of patient's allergies indicates:   Allergen Reactions    Medrol [methylprednisolone]      Depression and tearful    Penicillins Rash           Physical Examination     Initial Vitals [02/26/25 1459]   BP Pulse Resp Temp SpO2   (!) 193/87 82 16 98.4 °F (36.9 °C) 99 %      MAP       --           Physical Exam    Nursing note and vitals reviewed.  Constitutional: She is not diaphoretic. No distress.   HENT:   Head: Normocephalic and atraumatic.   Eyes: Conjunctivae are normal. Pupils are equal, round, and reactive to light. Right eye exhibits no discharge. Left eye exhibits no discharge. No scleral icterus. Right eye exhibits abnormal extraocular motion.   OD: ptosis   Neck: Carotid bruit is not present.   Cardiovascular:  Normal rate and regular rhythm.     Exam reveals no gallop and no friction rub.       Murmur heard.  Systolic murmur is present with a grade of 1/6.  Pulmonary/Chest: No respiratory distress. She has no wheezes. She has no rhonchi. She has no rales.   Abdominal: Abdomen is soft. She exhibits no distension. There is no abdominal tenderness.     Neurological: She is alert and oriented to person, place, and time. GCS score is 15. GCS eye subscore is 4. GCS verbal subscore is 5. GCS motor subscore is 6.   5/5 strength throughout all extremities.   Abnormal finger-to-nose bilaterally.   Skin: Skin is warm and dry. No pallor.   Psychiatric: She has a normal mood and affect. Her speech is normal and behavior is normal. She is not actively hallucinating. She is attentive.            Labs     Labs Reviewed   CBC W/ AUTO DIFFERENTIAL - Abnormal       Result Value    WBC 8.56      RBC 3.87 (*)     Hemoglobin 11.8 (*)     Hematocrit 37.1      MCV 96      MCH 30.5      MCHC 31.8 (*)     RDW 13.2      Platelets 241      MPV 10.6      Immature Granulocytes 0.5      Gran # (ANC) 6.0      Immature Grans (Abs) 0.04      Lymph # 1.8      Mono # 0.5      Eos # 0.1      Baso #  0.06      nRBC 0      Gran % 70.6      Lymph % 21.0      Mono % 5.8      Eosinophil % 1.4      Basophil % 0.7      Differential Method Automated      Narrative:     Release to patient->Immediate   COMPREHENSIVE METABOLIC PANEL - Abnormal    Sodium 140      Potassium 4.2      Chloride 105      CO2 25      Glucose 90      BUN 30 (*)     Creatinine 0.9      Calcium 10.4      Total Protein 8.1      Albumin 3.8      Total Bilirubin 0.5      Alkaline Phosphatase 59      AST 27      ALT 11      eGFR >60.0      Anion Gap 10      Narrative:     Release to patient->Immediate   PROTIME-INR    Prothrombin Time 11.2      INR 1.0      Narrative:     Release to patient->Immediate   TSH    TSH 0.774      Narrative:     Release to patient->Immediate   LIPID PANEL    Cholesterol 163      Triglycerides 75      HDL 66      LDL Cholesterol 82.0      HDL/Cholesterol Ratio 40.5      Total Cholesterol/HDL Ratio 2.5      Non-HDL Cholesterol 97      Narrative:     Release to patient->Immediate   HEPATITIS C ANTIBODY    Hepatitis C Ab Non-reactive      Narrative:     Release to patient->Immediate   HIV 1 / 2 ANTIBODY    HIV 1/2 Ag/Ab Non-reactive      Narrative:     Release to patient->Immediate   SARS-COV-2 RNA AMPLIFICATION, QUAL    SARS-CoV-2 RNA, Amplification, Qual Negative     HEMOGLOBIN A1C   HEMOGLOBIN A1C    Hemoglobin A1C 5.6      Estimated Avg Glucose 114      Narrative:     ADD ON [facility] TO [id number]. PER CALISTA SANCHEZ MD  02/27/2025  04:17       Release to patient->Immediate        Imaging     Imaging Results              CTA Head and Neck (xpd) (Final result)  Result time 02/27/25 03:27:35      Final result by Roman Triana MD (02/27/25 03:27:35)                   Impression:      The left vertebral artery appears congenitally diminutive, and essentially ends at the anterior spinal artery and PICA origins.  This is favored to reflect congenital/chronic etiology given recent MR findings.  Acutely occluded distal V4 segment  felt unlikely as the basilar artery diameter is similar to the right distal vertebral artery.  Bilateral PCAs also patent.    Otherwise, no acute intracranial abnormality.  No focal occlusion or high-grade stenosis of the major intracranial arteries.    Small extra-axial mass over the left frontal convexity, presumed meningioma, unchanged compared to recent priors.    Multinodular thyroid, with largest nodule measuring up to 1.7 cm in size.  Suggest follow-up outpatient thyroid ultrasound if not previously obtained.    Electronically signed by resident: Octavio Mccormack  Date:    02/27/2025  Time:    02:31    Electronically signed by: Roman Triana MD  Date:    02/27/2025  Time:    03:27               Narrative:    EXAMINATION:  CTA HEAD AND NECK (XPD)    CLINICAL HISTORY:  Diplopia;    TECHNIQUE:  Axial CT images obtained throughout the region of the head before and after the administration of intravenous contrast.  CT angiogram was performed from through the cervical and intracranial vasculature during the IV bolus administration of 75mL of Omnipaque 350.  Multiplanar MPR and MIP reformats were performed.    COMPARISON:  MR brain, CT head 02/26/2025.    FINDINGS:  1.2 cm calcified extra-axial mass over the left frontal convexity, presumed meningioma.  Minimal localized mass effect.  Unchanged appearance compared to recent priors.    The ventricles are stable in size without evidence of hydrocephalus.    The brain appears unchanged.  No new parenchymal mass, hemorrhage, edema or major vascular distribution infarct.    No extra-axial blood or fluid collections.    The cranium appears intact.  Hyperostosis frontalis.    Bilateral lens implants.  Mastoid air cells are clear. Mild patchy mucosal thickening in the paranasal sinuses.  Vascular calcifications at the skull base.      CTA:    The aortic arch demonstrates mild calcific atherosclerosis.    No significant stenosis at the major branch vessel origins.    The right  common carotid artery is normal in caliber. Calcified plaque without significant stenosis at the carotid bifurcation.The right internal carotid artery is normal in caliber.    The left common carotid artery is normal in caliber. Calcified plaque without significant stenosis at the carotid bifurcation.The left internal carotid artery is normal in caliber.    The right vertebral artery is normal in caliber.    Direct origin of the left vertebral body from the aortic arch.  Diminutive caliber, likely congenital.  Distal V4 segment essentially ends in the anterior spinal artery and PICA.  Basilar artery is similar caliber to the right vertebral artery.    Basilar artery is within normal limits without focal abnormality.    The proximal anterior, middle, and posterior cerebral arteries are within normal limits.  No evidence of significant stenosis, focal occlusion, or intracranial aneurysm.    Miscellaneous: Several calcified and noncalcified thyroid nodules.  Largest nodule in the thyroid gland measures up to 1.7 cm in size.  Lung apices are clear.  Proximal arteries are patent.  Obliquely oriented left molars with periapical lucency.                                         MRI Brain Without Contrast (Final result)  Result time 02/26/25 22:37:44      Final result by Lefty Johnson MD (02/26/25 22:37:44)                   Impression:      1.  No evidence of acute intracranial pathology.    2.  Small calcified extra-axial mass over left frontal convexity, presumed meningioma, without other associated signal abnormality.    Electronically signed by resident: Octavio Mccormack  Date:    02/26/2025  Time:    22:14    Electronically signed by: Lefty Johnson MD  Date:    02/26/2025  Time:    22:37               Narrative:    EXAMINATION:  MRI BRAIN WITHOUT CONTRAST    CLINICAL HISTORY:  Neuro deficit, acute, stroke suspected;    TECHNIQUE:  Multiplanar multisequence MR imaging of the brain was performed without the administration of  intravenous contrast.    COMPARISON:  Same-date head CT.    FINDINGS:  1.2 cm calcified extra-axial mass over the left frontal convexity, presumed meningioma.  No significant subjacent vasogenic edema, previously described CT findings was likely related to streak artifact.  Minimal localized mass effect.  No midline shift.    Ventricles are stable in size.  No hydrocephalus.    Minimal T2/FLAIR hyperintensity in the supratentorial white matter, nonspecific but most likely reflecting chronic microvascular ischemic changes. No recent or remote major vascular distribution infarct. No recent or remote hemorrhage.  No mass effect or midline shift.    No extra-axial blood or fluid collections.    The T2 skull base flow voids are preserved.  Bone marrow signal intensity unremarkable.  Hyperostosis frontalis.    Mastoid air cells are clear. Minimal patchy mucosal thickening in the paranasal sinuses.  Bilateral lens implants.                                       CT Head Without Contrast (Final result)  Result time 02/26/25 19:38:40      Final result by Lefty Johnson MD (02/26/25 19:38:40)                   Impression:      Small calcified extra-axial mass over the left frontal convexity, presumed meningioma.  Minimal vasogenic edema in a subjacent frontal gyrus.  Correlation recommended, though this finding is favored chronic.  MRI with postcontrast sequences may be attempted for further evaluation.    Otherwise, no evidence of acute intracranial pathology.    Electronically signed by resident: Octavio Mccormack  Date:    02/26/2025  Time:    19:16    Electronically signed by: Lefty Johnson MD  Date:    02/26/2025  Time:    19:38               Narrative:    EXAMINATION:  CT HEAD WITHOUT CONTRAST    CLINICAL HISTORY:  Neuro deficit, acute, stroke suspected;    TECHNIQUE:  Low dose axial CT images obtained throughout the head without the use of intravenous contrast.  Axial, sagittal and coronal reconstructions were  performed.    COMPARISON:  None.    FINDINGS:  1.2 cm calcified extra-axial mass over the left frontal convexity, presumed meningioma.  Mild localized mass effect with impression upon a subjacent left frontal gyrus (coronal image 70), with subtle hypoattenuation at the level (axial image 2:25), noting preserved gray-white differentiation.  No midline shift.    Ventricles and sulci are normal in size for age without evidence of hydrocephalus.    Minimal hypoattenuation in the supratentorial white matter, nonspecific but most likely reflecting chronic microvascular ischemic changes. No parenchymal mass effect, hemorrhage, edema or major vascular distribution infarct.    No extra-axial blood or fluid collections.    No displaced calvarial fracture.  Hyperostosis frontalis.  Mastoid air cells and paranasal sinuses are essentially clear.  Bilateral lens implants.  Vascular calcifications at the skull base.                                        ED Course     The patient received the following medications:  Medications   fluorescein ophthalmic strip 1 each (1 each Both Eyes Given 2/26/25 2300)   proparacaine 0.5 % ophthalmic solution 1 drop (1 drop Both Eyes Given 2/26/25 2300)   iohexoL (OMNIPAQUE 350) injection 75 mL (75 mLs Intravenous Given 2/27/25 0207)           ED Course as of 02/28/25 0718   Wed Feb 26, 2025   1851 ECG 12 lead  Time of study: 02/26/2025 16:34  Independently interpreted by me.  Sinus rhythm with occasional PVCs.  Ventricular rate 80 beats per minute.  Normal axis.  Normal QRS duration and QTc interval.  No ST segment elevation or depression.  Normal T-wave morphology.   [LP]      ED Course User Index  [LP] Cm Guzmán III, MD        Medical Decision Making                 Medical Decision Making  Differential diagnoses included acute stroke, intracranial mass, metabolic disorders, and other conditions.  Head CT negative for acute intracranial processes.  No concerning lab abnormalities.   Patient signed out to Dr. Fermin at shift change with MRI brain pending.    Amount and/or Complexity of Data Reviewed  Labs: ordered.  Radiology: ordered.  ECG/medicine tests:  Decision-making details documented in ED Course.    Risk  Prescription drug management.              Diagnoses      ICD-10-CM ICD-9-CM   Acute focal neurological deficit  R29.818 781.99   Ptosis of right eyelid  H02.401 374.30   Diplopia  H53.2 368.2         Dispostion     Patient signed out to Dr. Fermin at end of shift.     Cm Guzmán III, MD  02/28/25 0721

## 2025-02-27 NOTE — PROVIDER PROGRESS NOTES - EMERGENCY DEPT.
Encounter Date: 2/26/2025    ED Physician Progress Notes        I assumed care of patient at sign out pending: MRI brain, consult.    Woke up yesterday with R eye ptosis and diplopia. R eye diplopia resolved with covering up eye. Head CT looks fine. Pending MRI to look for stroke. If not, ophtho.     Summary of Results:    CTA Head and Neck (xpd)   Final Result      The left vertebral artery appears congenitally diminutive, and essentially ends at the anterior spinal artery and PICA origins.  This is favored to reflect congenital/chronic etiology given recent MR findings.  Acutely occluded distal V4 segment felt unlikely as the basilar artery diameter is similar to the right distal vertebral artery.  Bilateral PCAs also patent.      Otherwise, no acute intracranial abnormality.  No focal occlusion or high-grade stenosis of the major intracranial arteries.      Small extra-axial mass over the left frontal convexity, presumed meningioma, unchanged compared to recent priors.      Multinodular thyroid, with largest nodule measuring up to 1.7 cm in size.  Suggest follow-up outpatient thyroid ultrasound if not previously obtained.      Electronically signed by resident: Octavio Mccormack   Date:    02/27/2025   Time:    02:31      Electronically signed by: Roman Triana MD   Date:    02/27/2025   Time:    03:27      MRI Brain Without Contrast   Final Result      1.  No evidence of acute intracranial pathology.      2.  Small calcified extra-axial mass over left frontal convexity, presumed meningioma, without other associated signal abnormality.      Electronically signed by resident: Octavio Mccormack   Date:    02/26/2025   Time:    22:14      Electronically signed by: Lefty Johnson MD   Date:    02/26/2025   Time:    22:37      CT Head Without Contrast   Final Result      Small calcified extra-axial mass over the left frontal convexity, presumed meningioma.  Minimal vasogenic edema in a subjacent frontal gyrus.  Correlation  recommended, though this finding is favored chronic.  MRI with postcontrast sequences may be attempted for further evaluation.      Otherwise, no evidence of acute intracranial pathology.      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 Recommend testing for DM, CTA to rule out aneurysm, which is negative. MRI with contrast needed but  only if no mass confirmed on MRI without, which it was, so can be done outpatient. Sent off A1c. At this time no acute interventions and stable for discharge with close f/u with PCP.    Disposition:  discharge.     Patient Yes

## 2025-02-27 NOTE — TELEPHONE ENCOUNTER
----- Message from Carlton Gaming sent at 2/27/2025 12:15 AM CST -----  Regarding: Follow Up  Hi,This pt was seen for CN3 palsy. Please schedule in neurophthalmology clinic in the next 2-4 weeks.Thank you,Carlton Gaming, YONASGY-2

## 2025-02-27 NOTE — ED NOTES
Assumed care of the patient. Report received from Sarah FRENCH, Pt on continuous cardiac monitoring, continuous pulse oximetry, and automatic BP cuff cycling Q15min. Pt in hospital gown, side rails up X2, bed low and locked, and call light is placed within reach. One family/visitors at bedside at this time. Pt denies any complaints or needs.    Francoise De Jesus, a 79 y.o. female presents to the ED w/ complaint of right eye droop and double vision. Pt sent here from PCP. Pt denies headaches, numbness, or tingling. No hx of stroke.     Triage note:  Chief Complaint   Patient presents with    dr garcia     Right eye droop and double vision since 11am yesterday. Doc sent for concern of stroke in eye     Review of patient's allergies indicates:   Allergen Reactions    Medrol [methylprednisolone]      Depression and tearful    Penicillins Rash     Past Medical History:   Diagnosis Date    Arthritis     Atherosclerosis of aorta     noted on CXR 11/17/2014    Breast cancer 2011    stage I right breast cancer    Disorder of kidney and ureter     History of peptic ulcer     Hyperlipidemia     Hypertension     Macular degeneration 03/13/2017    Morbid obesity     Prediabetes

## 2025-02-27 NOTE — CONSULTS
"Consultation Report  Ophthalmology Service    Date: 02/26/2025    Reason for Consult: "new diplopia"     History of Present Illness: Francoise De Jesus is a 79 y.o. female with past ocular hx of exudative AMD who presented to [facility] with new onset diplopia. Pt states that 1 day ago she noticed new binocular diplopia. States images are  vertically. Diplopia resolves when either eye is closed. Also noticed that her right eyelid has been drooping over this same time period. No other symptoms at this time. Denies headaches, temporal pain/TTP, jaw claudication, scalp sensitivity, limb/girdle weakness/pain, fevers, weight loss. Patient denies flashes, floaters, or curtain-veil in visual field ou. Denies any ocular discomfort ou.     POcularHx: Neovascular AMD    Current eye gtts: ATs     Family Hx: Denies family history of glaucoma, macular degeneration, or blindness. family history includes Breast cancer (age of onset: 58) in her sister; Breast cancer (age of onset: 72) in her sister; Cancer in her sister and sister; Cancer (age of onset: 80) in her brother; Cataracts in her sister and sister; Diabetes in her brother and sister; Glaucoma in her brother; Hyperlipidemia in her brother; Hypertension in her brother, brother, brother, brother, brother, father, and sister; Leukemia in her brother; Lung cancer in her father; Macular degeneration in her brother, brother, sister, sister, sister, and sister; No Known Problems in her son and son; Prostate cancer in her brother; Stomach cancer in her mother.     PMHx:  has a past medical history of Arthritis, Atherosclerosis of aorta, Breast cancer (2011), Disorder of kidney and ureter, History of peptic ulcer, Hyperlipidemia, Hypertension, Macular degeneration (03/13/2017), Morbid obesity, and Prediabetes.     PSurgHx:  has a past surgical history that includes Cholecystectomy; Eye surgery; Breast biopsy (Left); Breast lumpectomy (Right, 11/2011); Cataract extraction w/  intraocular " lens implant (Left, 07/22/2017); and Cataract extraction w/  intraocular lens implant (Right, 07/06/2017).     Home Medications:   Prior to Admission medications    Medication Sig Start Date End Date Taking? Authorizing Provider   acetaminophen (TYLENOL) 500 MG tablet Take 500 mg by mouth every 6 (six) hours as needed for Pain.    Provider, Historical   ANTIOX#10/OM3/DHA/EPA/LUT/ZEAX (I-CAPS ORAL) Take 1 tablet by mouth 2 (two) times a day.    Provider, Historical   aspirin (ECOTRIN) 81 MG EC tablet Take 1 tablet (81 mg total) by mouth once daily. 3/13/17 12/18/24  Lulu Auguste MD   fenofibrate (TRICOR) 145 MG tablet Take 1 tablet (145 mg total) by mouth once daily. 1 Tablet Oral Every evening 10/23/24   Lulu Auguste MD   hydroCHLOROthiazide (HYDRODIURIL) 25 MG tablet Take 1 tablet (25 mg total) by mouth once daily. 1 Tablet Oral Every morning 10/23/24   Lulu Auguste MD   lisinopriL (PRINIVIL,ZESTRIL) 40 MG tablet Take 1 tablet (40 mg total) by mouth every morning. 10/23/24   Lulu Auguste MD   omeprazole (PRILOSEC) 40 MG capsule TAKE ONE CAPSULE BY MOUTH EVERY DAY AS NEEDED FOR HEARTBURN 10/23/24   Lulu Auguste MD   pravastatin (PRAVACHOL) 10 MG tablet Take 1 tablet (10 mg total) by mouth once daily. 10/23/24   Lulu Auguste MD        Medications this encounter:    pegcetacoplan (PF)  15 mg Intravitreal        Allergies: is allergic to medrol [methylprednisolone] and penicillins.     Social:  reports that she has never smoked. She has never used smokeless tobacco. She reports that she does not drink alcohol and does not use drugs.     ROS: As per HPI    Ocular examination/Dilated fundus examination:  Base Eye Exam       Visual Acuity (Snellen - Linear)         Right Left    Dist sc 20/70 20/70              Tonometry (Applanation, 11:55 PM)         Right Left    Pressure 15 9              Pupils         Dark Light Shape React APD    Right 4 2 Round Brisk None    Left 4 2 Round Brisk  None              Visual Fields         Right Left     Full Full              Extraocular Movement         Right Left     Full, Ortho Full, Ortho              Neuro/Psych       Oriented x3: Yes    Mood/Affect: Normal              Dilation       Both eyes: 1% Mydriacyl @ 11:55 PM                  Slit Lamp and Fundus Exam       External Exam         Right Left    External Normal Normal              Slit Lamp Exam         Right Left    Lids/Lashes Ptosis, MGD MGD    Conjunctiva/Sclera White and quiet White and quiet    Cornea Clear Clear    Anterior Chamber Deep and quiet Deep and quiet    Iris Round and reactive Round and reactive    Lens Posterior chamber intraocular lens Posterior chamber intraocular lens    Anterior Vitreous Posterior vitreous detachment Posterior vitreous detachment              Fundus Exam         Right Left    Disc Normal Normal    C/D Ratio 0.3 0.3    Macula Drusen, SR fibrosis, no heme Drusen, SR fibrosis, GA, no heme    Vessels Normal Normal    Periphery Peripheral reticular degeneration, Attached 360 without breaks, 90D, 28D Peripheral reticular degeneration, Attached 360 without breaks, 90D, 28D                    2/26/2025    Assessment/Plan:     #Isolated third nerve palsy, right eye  - 1 day of binocular diplopia and right upper eyelid ptosis  - BCVA 20/70 OU, IOP wnl, no APD  - Pupil sparing, pupil not dilated, pupil is reactive to light, no APD  - EOM with some limitation in elevation, depression  -  Denies hx of T2DM, endorses, HTN, HLD  - No diurnal variation in symptoms, does not have fatigue with EOM  - TSH wnl  - Other cranial nerves assessed and intact  - ROS for GCA all negative  - MRI brain without evidence of acute intracranial pathology     #Exudative age-related macular degeneration of both eyes with active choroidal neovascularization   - Stable findings from last exam  - Will have pt follow up in retina clinic outpatient     Recommendations:  - MRI brain, orbits with  contrast  - CTA head and neck  - CBC, fasting glucose, HBA1c      Patient's Best Contact Number: [phone]     Carlton Gaming MD   [employer] Ophthalmology PGY2  02/26/2025  11:56 PM        Common Ophthalmologic Abbreviations  OD: right eye  OS: left eye  OU: both eyes  IOP: intraocular pressure  VA: visual acuity  PH: pinhole  HM: hand motion  LP: light perception  NLP: no light perception  DFE: dilated fundus examination  SLE: slit lamp examination  RD: retinal detachment   AT: artificial tears  PFAT: preservative free artificial tears

## 2025-02-27 NOTE — TELEPHONE ENCOUNTER
Pt came into clinic @ [facility] yesterday asking to get an appointment. Pt had her right eye closed stating she was doing it voluntarily. When I asked her if she could open up her eye she stated that it was open. I advised patient that she needed to head to ER to rule out something more serious going on. Pt's son stated he was bringing her in.

## 2025-03-05 ENCOUNTER — LAB VISIT (OUTPATIENT)
Dept: LAB | Facility: HOSPITAL | Age: 80
End: 2025-03-05
Attending: INTERNAL MEDICINE
Payer: MEDICARE

## 2025-03-05 DIAGNOSIS — E83.52 SERUM CALCIUM ELEVATED: ICD-10-CM

## 2025-03-05 DIAGNOSIS — R79.89 ELEVATED LFTS: ICD-10-CM

## 2025-03-05 DIAGNOSIS — E87.5 HYPERKALEMIA: ICD-10-CM

## 2025-03-05 LAB
ALBUMIN SERPL BCP-MCNC: 3.5 G/DL (ref 3.5–5.2)
ALP SERPL-CCNC: 57 U/L (ref 40–150)
ALT SERPL W/O P-5'-P-CCNC: 8 U/L (ref 10–44)
ANION GAP SERPL CALC-SCNC: 8 MMOL/L (ref 8–16)
AST SERPL-CCNC: 29 U/L (ref 10–40)
BILIRUB SERPL-MCNC: 0.5 MG/DL (ref 0.1–1)
BUN SERPL-MCNC: 34 MG/DL (ref 8–23)
CA-I BLDV-SCNC: 1.33 MMOL/L (ref 1.06–1.42)
CALCIUM SERPL-MCNC: 10.2 MG/DL (ref 8.7–10.5)
CHLORIDE SERPL-SCNC: 109 MMOL/L (ref 95–110)
CO2 SERPL-SCNC: 24 MMOL/L (ref 23–29)
CREAT SERPL-MCNC: 0.9 MG/DL (ref 0.5–1.4)
EST. GFR  (NO RACE VARIABLE): >60 ML/MIN/1.73 M^2
GLUCOSE SERPL-MCNC: 94 MG/DL (ref 70–110)
POTASSIUM SERPL-SCNC: 4.3 MMOL/L (ref 3.5–5.1)
PROT SERPL-MCNC: 7.5 G/DL (ref 6–8.4)
PTH-INTACT SERPL-MCNC: 55.1 PG/ML (ref 9–77)
SODIUM SERPL-SCNC: 141 MMOL/L (ref 136–145)

## 2025-03-05 PROCEDURE — 80053 COMPREHEN METABOLIC PANEL: CPT | Performed by: INTERNAL MEDICINE

## 2025-03-05 PROCEDURE — 83970 ASSAY OF PARATHORMONE: CPT | Performed by: INTERNAL MEDICINE

## 2025-03-05 PROCEDURE — 36415 COLL VENOUS BLD VENIPUNCTURE: CPT | Mod: PO | Performed by: INTERNAL MEDICINE

## 2025-03-05 PROCEDURE — 82330 ASSAY OF CALCIUM: CPT | Performed by: INTERNAL MEDICINE

## 2025-03-06 ENCOUNTER — RESULTS FOLLOW-UP (OUTPATIENT)
Dept: FAMILY MEDICINE | Facility: CLINIC | Age: 80
End: 2025-03-06

## 2025-03-11 ENCOUNTER — RESULTS FOLLOW-UP (OUTPATIENT)
Dept: FAMILY MEDICINE | Facility: CLINIC | Age: 80
End: 2025-03-11

## 2025-03-11 ENCOUNTER — HOSPITAL ENCOUNTER (OUTPATIENT)
Dept: RADIOLOGY | Facility: HOSPITAL | Age: 80
Discharge: HOME | End: 2025-03-11
Attending: INTERNAL MEDICINE
Payer: MEDICARE

## 2025-03-11 DIAGNOSIS — E04.1 THYROID NODULE: ICD-10-CM

## 2025-03-11 PROCEDURE — 76536 US EXAM OF HEAD AND NECK: CPT | Mod: TC

## 2025-03-11 PROCEDURE — 76536 US EXAM OF HEAD AND NECK: CPT | Mod: 26,,, | Performed by: RADIOLOGY

## 2025-03-12 ENCOUNTER — OFFICE VISIT (OUTPATIENT)
Dept: OPHTHALMOLOGY | Facility: CLINIC | Age: 80
End: 2025-03-12
Attending: OPHTHALMOLOGY
Payer: MEDICARE

## 2025-03-12 DIAGNOSIS — H35.3231 EXUDATIVE AGE-RELATED MACULAR DEGENERATION OF BOTH EYES WITH ACTIVE CHOROIDAL NEOVASCULARIZATION: Primary | ICD-10-CM

## 2025-03-12 DIAGNOSIS — H35.3134 ADVANCED ATROPHIC NONEXUDATIVE AGE-RELATED MACULAR DEGENERATION OF BOTH EYES WITH SUBFOVEAL INVOLVEMENT: ICD-10-CM

## 2025-03-12 DIAGNOSIS — H49.01: ICD-10-CM

## 2025-03-12 DIAGNOSIS — H11.31 SUBCONJUNCTIVAL HEMORRHAGE, RIGHT: ICD-10-CM

## 2025-03-12 PROCEDURE — 99999 PR PBB SHADOW E&M-EST. PATIENT-LVL II: CPT | Mod: PBBFAC,,, | Performed by: OPHTHALMOLOGY

## 2025-03-12 PROCEDURE — 99214 OFFICE O/P EST MOD 30 MIN: CPT | Mod: 25,S$PBB,, | Performed by: OPHTHALMOLOGY

## 2025-03-12 PROCEDURE — 92134 CPTRZ OPH DX IMG PST SGM RTA: CPT | Mod: PBBFAC,PO | Performed by: OPHTHALMOLOGY

## 2025-03-12 PROCEDURE — 67028 INJECTION EYE DRUG: CPT | Mod: PBBFAC,PO,LT | Performed by: OPHTHALMOLOGY

## 2025-03-12 PROCEDURE — 99212 OFFICE O/P EST SF 10 MIN: CPT | Mod: PBBFAC,PO | Performed by: OPHTHALMOLOGY

## 2025-03-12 PROCEDURE — 99999PBSHW PR PBB SHADOW TECHNICAL ONLY FILED TO HB: Mod: JZ,PBBFAC,,

## 2025-03-12 RX ADMIN — Medication 1.25 MG: at 03:03

## 2025-03-12 NOTE — PROGRESS NOTES
Subjective:       Patient ID: Francoise De Jesus is a 79 y.o. female      Chief Complaint   Patient presents with    Macular Degeneration     History of Present Illness  HPI    8 week Oct/Oksana OU only     Pt went to ED for 3rd nerve palsy 02/26/2025. Had imaging    Lid was drooping OD and had diplopia.  Both have since resolved.  Va   stable.  No HA    Pt is due to see neurology April 4th, has questions on if she should see   Neuro oph. Pt has sub conj on OD today. Pt reports her vision has improved   since ER visit.       Last edited by Enrique Potter MD on 3/12/2025  5:15 PM.        Imaging:    See report    Assessment/Plan:     1. Exudative age-related macular degeneration of both eyes with active choroidal neovascularization  Controlled OU today with q 12 wks wks s/p Av OU    IOP good, ONH appears stable OU  Avas OU q 12 wks    Due OU today but pt worried about having inj OD given recent 3rd n palsy.  Discussed that unrelated but pt wishes to have inj OS only    Av OS today  Revisit OD in future    - Prior authorization Order  - Posterior Segment OCT Retina-Both eyes    1. 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 Subconjunctival hematoma, right  Etiology unclear  Asymptomatic  Observe    4. Pupil sparing third nerve palsy, right  Examined in ED 2/26  Pupil sparing at that time  Had imaging  Signs resolved today  Has neurology f/u scheduled-  keep that appt        Follow up in about 1 week (around 3/19/2025), or if symptoms worsen or fail to improve, for Injection Left eye, Syfovre.

## 2025-03-19 ENCOUNTER — OFFICE VISIT (OUTPATIENT)
Dept: OPHTHALMOLOGY | Facility: CLINIC | Age: 80
End: 2025-03-19
Attending: OPHTHALMOLOGY
Payer: MEDICARE

## 2025-03-19 DIAGNOSIS — H35.3231 EXUDATIVE AGE-RELATED MACULAR DEGENERATION OF BOTH EYES WITH ACTIVE CHOROIDAL NEOVASCULARIZATION: Primary | ICD-10-CM

## 2025-03-19 DIAGNOSIS — H35.3134 ADVANCED ATROPHIC NONEXUDATIVE AGE-RELATED MACULAR DEGENERATION OF BOTH EYES WITH SUBFOVEAL INVOLVEMENT: ICD-10-CM

## 2025-03-19 PROCEDURE — 96372 THER/PROPH/DIAG INJ SC/IM: CPT | Mod: PBBFAC,PO | Performed by: OPHTHALMOLOGY

## 2025-03-19 PROCEDURE — 99999PBSHW PR PBB SHADOW TECHNICAL ONLY FILED TO HB: Mod: JZ,PBBFAC,,

## 2025-03-19 PROCEDURE — 67028 INJECTION EYE DRUG: CPT | Mod: PBBFAC,PO,LT | Performed by: OPHTHALMOLOGY

## 2025-03-19 PROCEDURE — 99211 OFF/OP EST MAY X REQ PHY/QHP: CPT | Mod: PBBFAC,PO | Performed by: OPHTHALMOLOGY

## 2025-03-19 PROCEDURE — 99499 UNLISTED E&M SERVICE: CPT | Mod: S$PBB,,, | Performed by: OPHTHALMOLOGY

## 2025-03-19 PROCEDURE — 99999 PR PBB SHADOW E&M-EST. PATIENT-LVL I: CPT | Mod: PBBFAC,,, | Performed by: OPHTHALMOLOGY

## 2025-03-19 RX ADMIN — PEGCETACOPLAN 15 MG: 15 INJECTION, SOLUTION INTRAVITREAL at 02:03

## 2025-03-19 NOTE — PROGRESS NOTES
Subjective:       Patient ID: Francoise De Jesus is a 79 y.o. female      Chief Complaint   Patient presents with    Macular Degeneration     History of Present Illness  HPI    Sy OS Only     Pt is present for injection only.   Pt states her vision has remained stable.     No return of diplopia or ptosis  Last edited by Enrique Potter MD on 3/19/2025  2:16 PM.        Imaging:    See report    Assessment/Plan:     1. Exudative age-related macular degeneration of both eyes with active choroidal neovascularization  Controlled OU today with q 12 wks wks s/p Av OU    IOP good, ONH appears stable OU  Avas OU q 12 wks    OD next week.  Has OS only last week  After OD will catch up again for OU same day      - Prior authorization Order  - Posterior Segment OCT Retina-Both eyes    2. 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 Subconjunctival hematoma, right  Resolved    4. Pupil sparing third nerve palsy, right  Examined in ED 2/26  Pupil sparing at that time  Had imaging  Signs resolved still today  Has neurology f/u scheduled-  keep that appt        Follow up in about 1 week (around 3/26/2025) for Injection Right eye, Avastin.

## 2025-03-20 ENCOUNTER — TELEPHONE (OUTPATIENT)
Dept: OPHTHALMOLOGY | Facility: CLINIC | Age: 80
End: 2025-03-20
Payer: MEDICARE

## 2025-03-21 ENCOUNTER — TELEPHONE (OUTPATIENT)
Dept: FAMILY MEDICINE | Facility: CLINIC | Age: 80
End: 2025-03-21
Payer: MEDICARE

## 2025-03-21 NOTE — TELEPHONE ENCOUNTER
----- Message from Izabella Owens MD sent at 3/11/2025  5:18 PM CDT -----  Please notify patient that he has two nodules in his thyroid gland.  It does not appear cancerous, but will need a follow up study in 6 months. He will need an ultrasound in 6 months to check on the   size of the two nodules.   ----- Message -----  From: Interface, Rad Results In  Sent: 3/11/2025   2:49 PM CDT  To: Lulu Auguste MD

## 2025-03-25 ENCOUNTER — OFFICE VISIT (OUTPATIENT)
Dept: OPHTHALMOLOGY | Facility: CLINIC | Age: 80
End: 2025-03-25
Attending: OPHTHALMOLOGY
Payer: MEDICARE

## 2025-03-25 ENCOUNTER — CLINICAL SUPPORT (OUTPATIENT)
Dept: OPHTHALMOLOGY | Facility: CLINIC | Age: 80
End: 2025-03-25
Payer: MEDICARE

## 2025-03-25 DIAGNOSIS — H35.3231 EXUDATIVE AGE-RELATED MACULAR DEGENERATION OF BOTH EYES WITH ACTIVE CHOROIDAL NEOVASCULARIZATION: Primary | ICD-10-CM

## 2025-03-25 PROCEDURE — 99212 OFFICE O/P EST SF 10 MIN: CPT | Mod: PBBFAC,PO | Performed by: OPHTHALMOLOGY

## 2025-03-25 PROCEDURE — 99999 PR PBB SHADOW E&M-EST. PATIENT-LVL II: CPT | Mod: PBBFAC,,, | Performed by: OPHTHALMOLOGY

## 2025-03-25 PROCEDURE — 99999PBSHW PR PBB SHADOW TECHNICAL ONLY FILED TO HB: Mod: JZ,PBBFAC,,

## 2025-03-25 PROCEDURE — 67028 INJECTION EYE DRUG: CPT | Mod: PBBFAC,PO,RT | Performed by: OPHTHALMOLOGY

## 2025-03-25 PROCEDURE — 99499 UNLISTED E&M SERVICE: CPT | Mod: S$PBB,,, | Performed by: OPHTHALMOLOGY

## 2025-03-25 RX ADMIN — Medication 1.25 MG: at 10:03

## 2025-03-25 NOTE — PROGRESS NOTES
Subjective:       Patient ID: Francoise De Jesus is a 79 y.o. female      Chief Complaint   Patient presents with    Injections     History of Present Illness  HPI    1 week Here for Avastin OD only.      Pt states no changes since seen last week.    Eye drops:  Last edited by Enrique Potter MD on 3/25/2025 10:06 AM.        Imaging:    See report    Assessment/Plan:     1. Exudative age-related macular degeneration of both eyes with active choroidal neovascularization  Controlled OU today with q 12 wks wks s/p Av OU    Avas OU q 12 wks    Kevin OD today as planned  RTC due in 10 wk KEVIN OU    2. 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

## 2025-03-27 ENCOUNTER — TELEPHONE (OUTPATIENT)
Dept: NEUROLOGY | Facility: CLINIC | Age: 80
End: 2025-03-27
Payer: MEDICARE

## 2025-03-27 ENCOUNTER — PATIENT OUTREACH (OUTPATIENT)
Dept: ADMINISTRATIVE | Facility: OTHER | Age: 80
End: 2025-03-27
Payer: MEDICARE

## 2025-03-27 NOTE — TELEPHONE ENCOUNTER
----- Message from Xochilt Martinez NP sent at 3/27/2025  2:53 PM CDT -----  Regarding: Appropriate scheduling  Patient is scheduled to see me April 4th.  Can you please put patient on Dr. Garcia schedule on next week on any of his empty new patient slots.Thanks,Xochilt

## 2025-03-27 NOTE — PROGRESS NOTES
[facility] - Outreach Attempt    Community Health Worker left a voicemail message for 1st attempt to contact patient regarding: resources sent  Community Health Worker to attempt to contact patient on:

## 2025-04-10 ENCOUNTER — LAB VISIT (OUTPATIENT)
Dept: LAB | Facility: HOSPITAL | Age: 80
End: 2025-04-10
Attending: PSYCHIATRY & NEUROLOGY
Payer: MEDICARE

## 2025-04-10 ENCOUNTER — OFFICE VISIT (OUTPATIENT)
Dept: NEUROLOGY | Facility: CLINIC | Age: 80
End: 2025-04-10
Payer: MEDICARE

## 2025-04-10 VITALS
DIASTOLIC BLOOD PRESSURE: 84 MMHG | SYSTOLIC BLOOD PRESSURE: 190 MMHG | BODY MASS INDEX: 35.16 KG/M2 | HEART RATE: 79 BPM | WEIGHT: 192.25 LBS

## 2025-04-10 DIAGNOSIS — D32.9 MENINGIOMA: ICD-10-CM

## 2025-04-10 DIAGNOSIS — H49.01 3RD NERVE PALSY, COMPLETE, RIGHT: ICD-10-CM

## 2025-04-10 DIAGNOSIS — H02.401 PTOSIS OF RIGHT EYELID: ICD-10-CM

## 2025-04-10 DIAGNOSIS — H53.2 DIPLOPIA: Primary | ICD-10-CM

## 2025-04-10 LAB — ERYTHROCYTE [SEDIMENTATION RATE] IN BLOOD BY PHOTOMETRIC METHOD: 47 MM/HR

## 2025-04-10 PROCEDURE — 99213 OFFICE O/P EST LOW 20 MIN: CPT | Mod: PBBFAC | Performed by: PSYCHIATRY & NEUROLOGY

## 2025-04-10 PROCEDURE — 36415 COLL VENOUS BLD VENIPUNCTURE: CPT

## 2025-04-10 PROCEDURE — 85652 RBC SED RATE AUTOMATED: CPT

## 2025-04-10 PROCEDURE — 99999 PR PBB SHADOW E&M-EST. PATIENT-LVL III: CPT | Mod: PBBFAC,,, | Performed by: PSYCHIATRY & NEUROLOGY

## 2025-04-10 NOTE — PROGRESS NOTES
Neurology Clinic Note      Date: 4/10/25  Patient Name: Francoise De Jesus   MRN: [id number]   PCP: Lulu Auguste  Referring Provider: Karen Fermin MD           HPI:   Mrs. Francoise De Jesus is a 79 y.o. female presenting with R 3rd nerve palsy resolved.  Had vertical diplopia and ptosis , resolved.  No diabetes. No eye pain or headache.  Has macular degeneration, no preceeding uri, flulike symptoms.    Imaging studies done in er as below.  Incidental small L frontal meningioma seen.    According to the er note , this was pupil sparing.    Hemoglobin A1c, 5.6    Bp repeated at 181/83    Results for orders placed or performed during the hospital encounter of 02/26/25 (from the past 2160 hours)   CTA Head and Neck (xpd)    Narrative    EXAMINATION:  CTA HEAD AND NECK (XPD)    CLINICAL HISTORY:  Diplopia;    TECHNIQUE:  Axial CT images obtained throughout the region of the head before and after the administration of intravenous contrast.  CT angiogram was performed from through the cervical and intracranial vasculature during the IV bolus administration of 75mL of Omnipaque 350.  Multiplanar MPR and MIP reformats were performed.    COMPARISON:  MR brain, CT head 02/26/2025.    FINDINGS:  1.2 cm calcified extra-axial mass over the left frontal convexity, presumed meningioma.  Minimal localized mass effect.  Unchanged appearance compared to recent priors.    The ventricles are stable in size without evidence of hydrocephalus.    The brain appears unchanged.  No new parenchymal mass, hemorrhage, edema or major vascular distribution infarct.    No extra-axial blood or fluid collections.    The cranium appears intact.  Hyperostosis frontalis.    Bilateral lens implants.  Mastoid air cells are clear. Mild patchy mucosal thickening in the paranasal sinuses.  Vascular calcifications at the skull base.      CTA:    The aortic arch demonstrates mild calcific atherosclerosis.    No significant stenosis at the major branch vessel  origins.    The right common carotid artery is normal in caliber. Calcified plaque without significant stenosis at the carotid bifurcation.The right internal carotid artery is normal in caliber.    The left common carotid artery is normal in caliber. Calcified plaque without significant stenosis at the carotid bifurcation.The left internal carotid artery is normal in caliber.    The right vertebral artery is normal in caliber.    Direct origin of the left vertebral body from the aortic arch.  Diminutive caliber, likely congenital.  Distal V4 segment essentially ends in the anterior spinal artery and PICA.  Basilar artery is similar caliber to the right vertebral artery.    Basilar artery is within normal limits without focal abnormality.    The proximal anterior, middle, and posterior cerebral arteries are within normal limits.  No evidence of significant stenosis, focal occlusion, or intracranial aneurysm.    Miscellaneous: Several calcified and noncalcified thyroid nodules.  Largest nodule in the thyroid gland measures up to 1.7 cm in size.  Lung apices are clear.  Proximal arteries are patent.  Obliquely oriented left molars with periapical lucency.        Impression    The left vertebral artery appears congenitally diminutive, and essentially ends at the anterior spinal artery and PICA origins.  This is favored to reflect congenital/chronic etiology given recent MR findings.  Acutely occluded distal V4 segment felt unlikely as the basilar artery diameter is similar to the right distal vertebral artery.  Bilateral PCAs also patent.    Otherwise, no acute intracranial abnormality.  No focal occlusion or high-grade stenosis of the major intracranial arteries.    Small extra-axial mass over the left frontal convexity, presumed meningioma, unchanged compared to recent priors.    Multinodular thyroid, with largest nodule measuring up to 1.7 cm in size.  Suggest follow-up outpatient thyroid ultrasound if not previously  obtained.    Electronically signed by resident: Octavio Mccormack  Date:    02/27/2025  Time:    02:31    Electronically signed by: Roman Triana MD  Date:    02/27/2025  Time:    03:27   CT Head Without Contrast    Narrative    EXAMINATION:  CT HEAD WITHOUT CONTRAST    CLINICAL HISTORY:  Neuro deficit, acute, stroke suspected;    TECHNIQUE:  Low dose axial CT images obtained throughout the head without the use of intravenous contrast.  Axial, sagittal and coronal reconstructions were performed.    COMPARISON:  None.    FINDINGS:  1.2 cm calcified extra-axial mass over the left frontal convexity, presumed meningioma.  Mild localized mass effect with impression upon a subjacent left frontal gyrus (coronal image 70), with subtle hypoattenuation at the level (axial image 2:25), noting preserved gray-white differentiation.  No midline shift.    Ventricles and sulci are normal in size for age without evidence of hydrocephalus.    Minimal hypoattenuation in the supratentorial white matter, nonspecific but most likely reflecting chronic microvascular ischemic changes. No parenchymal mass effect, hemorrhage, edema or major vascular distribution infarct.    No extra-axial blood or fluid collections.    No displaced calvarial fracture.  Hyperostosis frontalis.  Mastoid air cells and paranasal sinuses are essentially clear.  Bilateral lens implants.  Vascular calcifications at the skull base.      Impression    Small calcified extra-axial mass over the left frontal convexity, presumed meningioma.  Minimal vasogenic edema in a subjacent frontal gyrus.  Correlation recommended, though this finding is favored chronic.  MRI with postcontrast sequences may be attempted for further evaluation.    Otherwise, no evidence of acute intracranial pathology.    Electronically signed by resident: Octavio Mccormack  Date:    02/26/2025  Time:    19:16    Electronically signed by: Lefty Johnson MD  Date:    02/26/2025  Time:    19:38     Results for  orders placed or performed during the hospital encounter of 02/26/25 (from the past 2160 hours)   MRI Brain Without Contrast    Impression    1.  No evidence of acute intracranial pathology.    2.  Small calcified extra-axial mass over left frontal convexity, presumed meningioma, without other associated signal abnormality.    Electronically signed by resident: Octavio Mccormack  Date:    02/26/2025  Time:    22:14    Electronically signed by: Lefty Johnson MD  Date:    02/26/2025  Time:    22:37       No cigarettes or alcohol.    Son lives with her and her older sister.    Does not drive.                PAST MEDICAL HISTORY:  Past Medical History:   Diagnosis Date    Arthritis     Atherosclerosis of aorta     noted on CXR 11/17/2014    Breast cancer 2011    stage I right breast cancer    Disorder of kidney and ureter     History of peptic ulcer     Hyperlipidemia     Hypertension     Macular degeneration 03/13/2017    Morbid obesity     Prediabetes        PAST SURGICAL HISTORY:  Past Surgical History:   Procedure Laterality Date    BREAST BIOPSY Left     BREAST LUMPECTOMY Right 11/2011    lumpectomy and SN biopsy with radiation    CATARACT EXTRACTION W/  INTRAOCULAR LENS IMPLANT Left 07/22/2017    Dr. Okeefe    CATARACT EXTRACTION W/  INTRAOCULAR LENS IMPLANT Right 07/06/2017    Dr. Okeefe    CHOLECYSTECTOMY      EYE SURGERY      Lt cataract       CURRENT MEDS:  Current Medications[1]    ALLERGIES:  Review of patient's allergies indicates:   Allergen Reactions    Medrol [methylprednisolone]      Depression and tearful    Penicillins Rash       FAMILY HISTORY:  Family History   Problem Relation Name Age of Onset    Stomach cancer Mother      Lung cancer Father      Hypertension Father      Breast cancer Sister Renita 58    Cataracts Sister Renita     Macular degeneration Sister Renita     Cancer Sister Renita         breast CA    Cataracts Sister Linnea     Macular degeneration Sister Linnea     Breast cancer Sister Linnea 72     Cancer Sister Linnea         breast CA     Diabetes Sister Linnea     Hypertension Sister Linnea     Macular degeneration Sister Blanca     Macular degeneration Sister Amita     Glaucoma Brother Yoni     Macular degeneration Brother Yoni         wet    Hypertension Brother Yoni     Macular degeneration Brother Chris         dry    Hypertension Brother Chris     Hypertension Brother Balwinder     Diabetes Brother Nigel     Hypertension Brother Nigel     Prostate cancer Brother Nigel     Cancer Brother Nigel 80        prostate     Hypertension Brother Gera     Hyperlipidemia Brother Gera     Leukemia Brother Gera     No Known Problems Son      No Known Problems Son      Ovarian cancer Neg Hx      Amblyopia Neg Hx      Blindness Neg Hx      Retinal detachment Neg Hx      Strabismus Neg Hx      Stroke Neg Hx      Thyroid disease Neg Hx         SOCIAL HISTORY:  Social History[2]    Review of Systems:  12 system review of systems is negative except for the symptoms mentioned in HPI.      Objective:     Vitals:    04/10/25 1023   BP: (!) 190/84   BP Location: Right arm   Patient Position: Sitting   Pulse: 79   Weight: 87.2 kg (192 lb 3.9 oz)         General: Well-developed, well-groomed. No apparent distress  Eyes: Anicteric, noninjected.  ENT: Normocephalic, atraumatic.    Respiratory: No respiratory distress.    Cardiovascular:  No carotid bruits or heart murmurs  Abdomen:  Nontender  Skin: No rashes, or lesions, nodules on exposed areas    Neurological examination    Mental status:  Alert, appropriate, oriented x3.  Speech fluent with no evidence of dysarthria or aphasia.  Mood pleasant.      Cranial nerves:  Cranial nerves 2-12 normal.    Motor:  Strength and tone normal.  No tremor.      Sensory:  Sensation to pinprick and position normal in the lower extremities.      Cerebellar:  Finger-nose-finger testing normal bilaterally.  Heel-shin testing normal bilaterally.      Reflexes:  Bilateral biceps jerks,  brachioradialis reflexes, knee jerks, ankle jerks, 2+ and symmetric with both toes downgoing.      Gait and station:  Gait normal including posture, rate, and stride length.         Images:            Other Studies:              Assessment and Plan:   Francoise De Jesus is a 79 y.o. female presenting with pupil sparing 3rd nerve palsy on the R with diplopia, and ptosis, resolved.  No evidence of stroke , brain tumor or aneurysm.   Likely etiology is ischemia to the 3rd nerve itself , most commonly seen in diabetics, but can be seen in individuals with other vascular risk factors.  It is somewhat unusual that there was no associated pain. Incidental L frontal meningioma.  Stable since previous imaging and would only be an issue if it became symptomatic.      Will send esr and acetylcholine binding antibody for completeness.    Chart, labs, images reviewed.History taken, exam done, plan formulated and discussed with patient.        Problem List Items Addressed This Visit    None  Visit Diagnoses         Diplopia    -  Primary      3rd nerve palsy, complete, right          Ptosis of right eyelid                  Follow up if symptoms worsen or fail to improve.        Elia Garcia MD  Neurology  [facility]         [1]   Current Outpatient Medications   Medication Sig Dispense Refill    acetaminophen (TYLENOL) 500 MG tablet Take 500 mg by mouth every 6 (six) hours as needed for Pain.      ANTIOX#10/OM3/DHA/EPA/LUT/ZEAX (I-CAPS ORAL) Take 1 tablet by mouth 2 (two) times a day.      fenofibrate (TRICOR) 145 MG tablet Take 1 tablet (145 mg total) by mouth once daily. 1 Tablet Oral Every evening 90 tablet 2    hydroCHLOROthiazide (HYDRODIURIL) 25 MG tablet Take 1 tablet (25 mg total) by mouth once daily. 1 Tablet Oral Every morning 90 tablet 2    lisinopriL (PRINIVIL,ZESTRIL) 40 MG tablet Take 1 tablet (40 mg total) by mouth every morning. 90 tablet 2    omeprazole (PRILOSEC) 40 MG capsule TAKE ONE CAPSULE BY MOUTH EVERY  DAY AS NEEDED FOR HEARTBURN 90 capsule 1    pravastatin (PRAVACHOL) 10 MG tablet Take 1 tablet (10 mg total) by mouth once daily. 90 tablet 2    aspirin (ECOTRIN) 81 MG EC tablet Take 1 tablet (81 mg total) by mouth once daily.  0     Current Facility-Administered Medications   Medication Dose Route Frequency Provider Last Rate Last Admin    bevacizumab (Avastin) 25 mg/mL ophthalmic injection syringe 1.25 mg  1.25 mg Intravitreal     1.25 mg at 03/12/25 1530    bevacizumab (Avastin) 25 mg/mL ophthalmic injection syringe 1.25 mg  1.25 mg Intravitreal     1.25 mg at 03/25/25 1015    pegcetacoplan (PF) 15 mg /0.1 mL injection 15 mg  15 mg Intravitreal     15 mg at 01/25/25 1154    pegcetacoplan (PF) 15 mg /0.1 mL injection 15 mg  15 mg Intravitreal     15 mg at 03/19/25 1414   [2]   Social History  Tobacco Use    Smoking status: Never    Smokeless tobacco: Never   Substance Use Topics    Alcohol use: No    Drug use: No

## 2025-04-17 ENCOUNTER — OFFICE VISIT (OUTPATIENT)
Dept: OPTOMETRY | Facility: CLINIC | Age: 80
End: 2025-04-17
Payer: MEDICARE

## 2025-04-17 DIAGNOSIS — H52.7 REFRACTIVE ERROR: Primary | ICD-10-CM

## 2025-04-17 PROCEDURE — 92015 DETERMINE REFRACTIVE STATE: CPT | Mod: ,,, | Performed by: OPTOMETRIST

## 2025-04-17 PROCEDURE — 99999 PR PBB SHADOW E&M-EST. PATIENT-LVL III: CPT | Mod: PBBFAC,,, | Performed by: OPTOMETRIST

## 2025-04-17 PROCEDURE — 99213 OFFICE O/P EST LOW 20 MIN: CPT | Mod: PBBFAC,PO | Performed by: OPTOMETRIST

## 2025-04-17 NOTE — PROGRESS NOTES
Subjective:       Patient ID: Francoise De Jesus is a 79 y.o. female      Chief Complaint   Patient presents with    Concerns About Ocular Health     History of Present Illness  Dls: 3/25/25 Dr. Potter     78 y/o female presents today for refraction   Pt c/o blurry vision at distance and near ou .  Pt wears bifocal glasses.        Assessment/Plan:     1. Refractive error (Primary)  Pt states wants near Rx only, does not drive and states takes off glasses for distance. Pt states currently using +3.75 readers and feel they aren't strong enough. Trial +4.50 in [facility] with [facility].     Eyeglass Final Rx       Eyeglass Final Rx         Sphere Cylinder    Right +4.50 Sphere    Left +4.50 Sphere      Type: SVL near    Expiration Date: 4/17/2026                        Follow up for retina as scheduled.

## 2025-04-21 ENCOUNTER — TELEPHONE (OUTPATIENT)
Dept: NEUROLOGY | Facility: CLINIC | Age: 80
End: 2025-04-21
Payer: MEDICARE

## 2025-04-21 NOTE — TELEPHONE ENCOUNTER
----- Message from Elia Garcia MD sent at 4/21/2025  3:01 PM CDT -----  Regarding: Abnormal lab results  Patient needs to make a prompt follow-up visit thank you

## 2025-04-29 ENCOUNTER — OFFICE VISIT (OUTPATIENT)
Dept: NEUROLOGY | Facility: CLINIC | Age: 80
End: 2025-04-29
Payer: MEDICARE

## 2025-04-29 VITALS
HEIGHT: 62 IN | WEIGHT: 192.25 LBS | DIASTOLIC BLOOD PRESSURE: 81 MMHG | BODY MASS INDEX: 35.38 KG/M2 | HEART RATE: 75 BPM | SYSTOLIC BLOOD PRESSURE: 162 MMHG | OXYGEN SATURATION: 99 %

## 2025-04-29 DIAGNOSIS — G70.00 OCULAR MYASTHENIA: Primary | ICD-10-CM

## 2025-04-29 PROCEDURE — 99213 OFFICE O/P EST LOW 20 MIN: CPT | Mod: S$PBB,,, | Performed by: PSYCHIATRY & NEUROLOGY

## 2025-04-29 PROCEDURE — 99999 PR PBB SHADOW E&M-EST. PATIENT-LVL III: CPT | Mod: PBBFAC,,, | Performed by: PSYCHIATRY & NEUROLOGY

## 2025-04-29 PROCEDURE — 99213 OFFICE O/P EST LOW 20 MIN: CPT | Mod: PBBFAC | Performed by: PSYCHIATRY & NEUROLOGY

## 2025-04-29 NOTE — PROGRESS NOTES
Neurology Clinic Note      Date: 4/29/25  Patient Name: Francoise De Jesus   MRN: [id number]   PCP: Lulu Auguste  Referring Provider: No ref. provider found      Subjective:        R 3rd palsy    Interval History (4/29/25):    No diplopia or further symptoms    Acetylcholine receptor antibody positive    Esr 47        PAST MEDICAL HISTORY:  Past Medical History:   Diagnosis Date    Arthritis     Atherosclerosis of aorta     noted on CXR 11/17/2014    Breast cancer 2011    stage I right breast cancer    Disorder of kidney and ureter     History of peptic ulcer     Hyperlipidemia     Hypertension     Macular degeneration 03/13/2017    Morbid obesity     Prediabetes        PAST SURGICAL HISTORY:  Past Surgical History:   Procedure Laterality Date    BREAST BIOPSY Left     BREAST LUMPECTOMY Right 11/2011    lumpectomy and SN biopsy with radiation    CATARACT EXTRACTION W/  INTRAOCULAR LENS IMPLANT Left 07/22/2017    Dr. Okeefe    CATARACT EXTRACTION W/  INTRAOCULAR LENS IMPLANT Right 07/06/2017    Dr. Okeefe    CHOLECYSTECTOMY      EYE SURGERY      Lt cataract       CURRENT MEDS:  Current Medications[1]    ALLERGIES:  Review of patient's allergies indicates:   Allergen Reactions    Medrol [methylprednisolone]      Depression and tearful    Penicillins Rash       FAMILY HISTORY:  Family History   Problem Relation Name Age of Onset    Stomach cancer Mother      Lung cancer Father      Hypertension Father      Breast cancer Sister Renita 58    Cataracts Sister Renita     Macular degeneration Sister Renita     Cancer Sister Renita         breast CA    Cataracts Sister Linnea     Macular degeneration Sister Linnea     Breast cancer Sister Linnea 72    Cancer Sister Linnea         breast CA     Diabetes Sister Linnea     Hypertension Sister Linnea     Macular degeneration Sister Blanca     Macular degeneration Sister Amita     Glaucoma Brother Yoni     Macular degeneration Brother Yoni         wet    Hypertension Brother Yoni     Macular  "degeneration Brother Chris         dry    Hypertension Brother Chris     Hypertension Brother Balwinder     Diabetes Brother Nigel     Hypertension Brother Nigel     Prostate cancer Brother Nigel     Cancer Brother Nigel 80        prostate     Hypertension Brother Gera     Hyperlipidemia Brother Gera     Leukemia Brother Gera     No Known Problems Son      No Known Problems Son      Ovarian cancer Neg Hx      Amblyopia Neg Hx      Blindness Neg Hx      Retinal detachment Neg Hx      Strabismus Neg Hx      Stroke Neg Hx      Thyroid disease Neg Hx         SOCIAL HISTORY:  Social History[2]    Review of Systems:  12 system review of systems is negative except for the symptoms mentioned in HPI.      Objective:     Vitals:    04/29/25 1327   BP: (!) 162/81   BP Location: Left arm   Patient Position: Sitting   Pulse: 75   SpO2: 99%   Weight: 87.2 kg (192 lb 3.9 oz)   Height: 5' 2" (1.575 m)         General: Well-developed, well-groomed. No apparent distress  Eyes: Anicteric, noninjected.  ENT: Normocephalic, atraumatic.    Respiratory: No respiratory distress.    Cardiovascular:  No carotid bruits, no murmurs  Abdomen:  Nontender  Skin: No rashes, or lesions, nodules on exposed areas    Ganglion cyst L wrist    Neurological examination    Mental status:  Alert, appropriate, oriented x3.  Speech fluent with no evidence of dysarthria or aphasia.  Mood pleasant.      Cranial nerves:  Cranial nerves 2-12 normal.    Motor:  Strength and tone normal.  No tremor.      Sensory:        Cerebellar:  Finger-nose-finger testing normal bilaterally.        Reflexes:  Bilateral biceps jerks, brachioradialis reflexes, knee jerks, ankle jerks, 2+ and symmetric       Gait and station:  Gait normal including posture, rate, and stride length.             Images:            Other Studies:              Assessment and Plan:   Francoise De Jesus is a 79 y.o. female presenting with ocular myasthenia    Asymptomatic    No new Rx's, patient instructed " to call if any recurrent problems    Ct chest without and with contrast, patient has had contrast before        Problem List Items Addressed This Visit    None  Visit Diagnoses         Ocular myasthenia    -  Primary              Follow up in about 4 weeks (around 5/27/2025).        Elia Garcia MD  Neurology  [facility]         [1]   Current Outpatient Medications   Medication Sig Dispense Refill    acetaminophen (TYLENOL) 500 MG tablet Take 500 mg by mouth every 6 (six) hours as needed for Pain.      ANTIOX#10/OM3/DHA/EPA/LUT/ZEAX (I-CAPS ORAL) Take 1 tablet by mouth 2 (two) times a day.      fenofibrate (TRICOR) 145 MG tablet Take 1 tablet (145 mg total) by mouth once daily. 1 Tablet Oral Every evening 90 tablet 2    hydroCHLOROthiazide (HYDRODIURIL) 25 MG tablet Take 1 tablet (25 mg total) by mouth once daily. 1 Tablet Oral Every morning 90 tablet 2    lisinopriL (PRINIVIL,ZESTRIL) 40 MG tablet Take 1 tablet (40 mg total) by mouth every morning. 90 tablet 2    omeprazole (PRILOSEC) 40 MG capsule TAKE ONE CAPSULE BY MOUTH EVERY DAY AS NEEDED FOR HEARTBURN 90 capsule 1    pravastatin (PRAVACHOL) 10 MG tablet Take 1 tablet (10 mg total) by mouth once daily. 90 tablet 2    aspirin (ECOTRIN) 81 MG EC tablet Take 1 tablet (81 mg total) by mouth once daily.  0     Current Facility-Administered Medications   Medication Dose Route Frequency Provider Last Rate Last Admin    bevacizumab (Avastin) 25 mg/mL ophthalmic injection syringe 1.25 mg  1.25 mg Intravitreal     1.25 mg at 03/12/25 1530    bevacizumab (Avastin) 25 mg/mL ophthalmic injection syringe 1.25 mg  1.25 mg Intravitreal     1.25 mg at 03/25/25 1015    pegcetacoplan (PF) 15 mg /0.1 mL injection 15 mg  15 mg Intravitreal     15 mg at 01/25/25 1154    pegcetacoplan (PF) 15 mg /0.1 mL injection 15 mg  15 mg Intravitreal     15 mg at 03/19/25 1414   [2]   Social History  Tobacco Use    Smoking status: Never    Smokeless tobacco: Never   Substance Use  Topics    Alcohol use: No    Drug use: No

## 2025-05-14 ENCOUNTER — OFFICE VISIT (OUTPATIENT)
Dept: OPHTHALMOLOGY | Facility: CLINIC | Age: 80
End: 2025-05-14
Attending: OPHTHALMOLOGY
Payer: MEDICARE

## 2025-05-14 ENCOUNTER — PATIENT OUTREACH (OUTPATIENT)
Dept: ADMINISTRATIVE | Facility: OTHER | Age: 80
End: 2025-05-14
Payer: MEDICARE

## 2025-05-14 ENCOUNTER — CLINICAL SUPPORT (OUTPATIENT)
Dept: OPHTHALMOLOGY | Facility: CLINIC | Age: 80
End: 2025-05-14
Payer: MEDICARE

## 2025-05-14 DIAGNOSIS — H35.3231 EXUDATIVE AGE-RELATED MACULAR DEGENERATION OF BOTH EYES WITH ACTIVE CHOROIDAL NEOVASCULARIZATION: Primary | ICD-10-CM

## 2025-05-14 PROCEDURE — 92134 CPTRZ OPH DX IMG PST SGM RTA: CPT | Mod: PBBFAC,PO | Performed by: OPHTHALMOLOGY

## 2025-05-14 PROCEDURE — 99999 PR PBB SHADOW E&M-EST. PATIENT-LVL III: CPT | Mod: PBBFAC,,, | Performed by: OPHTHALMOLOGY

## 2025-05-14 PROCEDURE — 67028 INJECTION EYE DRUG: CPT | Mod: PBBFAC,PO,LT | Performed by: OPHTHALMOLOGY

## 2025-05-14 PROCEDURE — 99499 UNLISTED E&M SERVICE: CPT | Mod: S$PBB,,, | Performed by: OPHTHALMOLOGY

## 2025-05-14 PROCEDURE — 99999PBSHW PR PBB SHADOW TECHNICAL ONLY FILED TO HB: Mod: JZ,PBBFAC,,

## 2025-05-14 PROCEDURE — 99213 OFFICE O/P EST LOW 20 MIN: CPT | Mod: PBBFAC,PO,25 | Performed by: OPHTHALMOLOGY

## 2025-05-14 RX ADMIN — PEGCETACOPLAN 15 MG: 15 INJECTION, SOLUTION INTRAVITREAL at 02:05

## 2025-05-14 NOTE — PROGRESS NOTES
Subjective:       Patient ID: Francoise De Jesus is a 79 y.o. female      Chief Complaint   Patient presents with    Procedure     History of Present Illness  HPI    7 week OCT/Anfovre OS    Pt states no changes since last exam and feels vision is the same    (-) pain (-) floaters (-) flashes      1. Wet ARMD OU with active CNV  -S/p Avastin OD (3/25/25)  -S/p Avastin OS (3/12/25)    2. 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        Imaging:    See report    Assessment/Plan:     1. Exudative age-related macular degeneration of both eyes with active choroidal neovascularization  Controlled OU today with q 12 wks wks s/p Av OU    Avas OU q 12 wks    Due early June.  Scheduled    2. 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

## 2025-05-14 NOTE — PROGRESS NOTES
[facility]W - Outreach Attempt    Community Health Worker left a voicemail message for 2nd attempt to contact patient regarding: Follow up resources   Community Health Worker to attempt to contact patient on: 5/20/25

## 2025-06-06 ENCOUNTER — OFFICE VISIT (OUTPATIENT)
Dept: FAMILY MEDICINE | Facility: CLINIC | Age: 80
End: 2025-06-06
Payer: MEDICARE

## 2025-06-06 ENCOUNTER — TELEPHONE (OUTPATIENT)
Dept: FAMILY MEDICINE | Facility: CLINIC | Age: 80
End: 2025-06-06

## 2025-06-06 VITALS
OXYGEN SATURATION: 96 % | TEMPERATURE: 99 F | DIASTOLIC BLOOD PRESSURE: 68 MMHG | WEIGHT: 186.06 LBS | BODY MASS INDEX: 34.24 KG/M2 | HEIGHT: 62 IN | SYSTOLIC BLOOD PRESSURE: 108 MMHG | HEART RATE: 92 BPM

## 2025-06-06 DIAGNOSIS — I12.9 BENIGN HYPERTENSION WITH CHRONIC KIDNEY DISEASE, STAGE III: ICD-10-CM

## 2025-06-06 DIAGNOSIS — E66.811 CLASS 1 OBESITY WITH BODY MASS INDEX (BMI) OF 34.0 TO 34.9 IN ADULT, UNSPECIFIED OBESITY TYPE, UNSPECIFIED WHETHER SERIOUS COMORBIDITY PRESENT: ICD-10-CM

## 2025-06-06 DIAGNOSIS — R09.81 NASAL CONGESTION: ICD-10-CM

## 2025-06-06 DIAGNOSIS — N18.30 BENIGN HYPERTENSION WITH CHRONIC KIDNEY DISEASE, STAGE III: ICD-10-CM

## 2025-06-06 DIAGNOSIS — R05.9 COUGH, UNSPECIFIED TYPE: Primary | ICD-10-CM

## 2025-06-06 LAB
CTP QC/QA: YES
POC MOLECULAR INFLUENZA A AGN: NEGATIVE
POC MOLECULAR INFLUENZA B AGN: NEGATIVE
SARS-COV-2 RNA RESP QL NAA+PROBE: NEGATIVE

## 2025-06-06 PROCEDURE — 99214 OFFICE O/P EST MOD 30 MIN: CPT | Mod: PBBFAC,PO

## 2025-06-06 PROCEDURE — 99999 PR PBB SHADOW E&M-EST. PATIENT-LVL IV: CPT | Mod: PBBFAC,,,

## 2025-06-06 PROCEDURE — 87635 SARS-COV-2 COVID-19 AMP PRB: CPT

## 2025-06-06 RX ORDER — AZELASTINE 1 MG/ML
1 SPRAY, METERED NASAL 2 TIMES DAILY
Qty: 30 ML | Refills: 0 | Status: SHIPPED | OUTPATIENT
Start: 2025-06-06

## 2025-06-06 RX ORDER — PROMETHAZINE HYDROCHLORIDE AND DEXTROMETHORPHAN HYDROBROMIDE 6.25; 15 MG/5ML; MG/5ML
5 SYRUP ORAL EVERY 6 HOURS PRN
Qty: 240 ML | Refills: 0 | Status: SHIPPED | OUTPATIENT
Start: 2025-06-06

## 2025-06-06 RX ORDER — BENZONATATE 200 MG/1
200 CAPSULE ORAL 3 TIMES DAILY PRN
Qty: 30 CAPSULE | Refills: 0 | Status: SHIPPED | OUTPATIENT
Start: 2025-06-06

## 2025-06-09 ENCOUNTER — RESULTS FOLLOW-UP (OUTPATIENT)
Dept: FAMILY MEDICINE | Facility: CLINIC | Age: 80
End: 2025-06-09

## 2025-06-09 ENCOUNTER — TELEPHONE (OUTPATIENT)
Dept: FAMILY MEDICINE | Facility: CLINIC | Age: 80
End: 2025-06-09
Payer: MEDICARE

## 2025-06-09 NOTE — TELEPHONE ENCOUNTER
----- Message from Dominique Light NP sent at 6/9/2025 10:52 AM CDT -----  Please contact patient with negative covid result. Thank you  ----- Message -----  From: Barbara Samuels LPN  Sent: 6/6/2025  12:14 PM CDT  To: Dominique Light NP

## 2025-06-09 NOTE — TELEPHONE ENCOUNTER
Attempted to contact patient. Left a voice mail for patient with Covid results and to call clinic back should she has any questions or concerns.

## 2025-06-11 ENCOUNTER — CLINICAL SUPPORT (OUTPATIENT)
Dept: OPHTHALMOLOGY | Facility: CLINIC | Age: 80
End: 2025-06-11
Payer: MEDICARE

## 2025-06-11 ENCOUNTER — OFFICE VISIT (OUTPATIENT)
Dept: OPHTHALMOLOGY | Facility: CLINIC | Age: 80
End: 2025-06-11
Attending: OPHTHALMOLOGY
Payer: MEDICARE

## 2025-06-11 DIAGNOSIS — H35.3231 EXUDATIVE AGE-RELATED MACULAR DEGENERATION OF BOTH EYES WITH ACTIVE CHOROIDAL NEOVASCULARIZATION: Primary | ICD-10-CM

## 2025-06-11 DIAGNOSIS — H35.3134 ADVANCED ATROPHIC NONEXUDATIVE AGE-RELATED MACULAR DEGENERATION OF BOTH EYES WITH SUBFOVEAL INVOLVEMENT: ICD-10-CM

## 2025-06-11 PROCEDURE — 92134 CPTRZ OPH DX IMG PST SGM RTA: CPT | Mod: PBBFAC,PO | Performed by: OPHTHALMOLOGY

## 2025-06-11 PROCEDURE — 99213 OFFICE O/P EST LOW 20 MIN: CPT | Mod: PBBFAC,PO | Performed by: OPHTHALMOLOGY

## 2025-06-11 PROCEDURE — 67028 INJECTION EYE DRUG: CPT | Mod: 50,PBBFAC,PO | Performed by: OPHTHALMOLOGY

## 2025-06-11 PROCEDURE — 99999PBSHW PR PBB SHADOW TECHNICAL ONLY FILED TO HB: Mod: PBBFAC,,,

## 2025-06-11 PROCEDURE — 99999 PR PBB SHADOW E&M-EST. PATIENT-LVL III: CPT | Mod: PBBFAC,,, | Performed by: OPHTHALMOLOGY

## 2025-06-11 RX ADMIN — Medication 1.25 MG: at 03:06

## 2025-06-11 NOTE — PROGRESS NOTES
Subjective:       Patient ID: Francoise De Jesus is a 79 y.o. female      Chief Complaint   Patient presents with    Injections     History of Present Illness  HPI    Pt feels vision is doing well OU    No flashes no floaters     Dls 05/14/25  Last edited by Enrique Potter MD on 6/11/2025  3:20 PM.        Imaging:    See report    Assessment/Plan:     1. Exudative age-related macular degeneration of both eyes with active choroidal neovascularization  Controlled OU today with q 12 wks wks s/p Av OU    Avas OU q 12 wks    Due today    2. 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

## 2025-06-13 ENCOUNTER — TELEPHONE (OUTPATIENT)
Dept: FAMILY MEDICINE | Facility: CLINIC | Age: 80
End: 2025-06-13
Payer: MEDICARE

## 2025-06-13 NOTE — TELEPHONE ENCOUNTER
Attempted to contact patient. Left a voice mail to call clinic back.Provider out of clinic on 08/18/2025.

## 2025-06-16 ENCOUNTER — TELEPHONE (OUTPATIENT)
Dept: FAMILY MEDICINE | Facility: CLINIC | Age: 80
End: 2025-06-16
Payer: MEDICARE

## 2025-06-16 DIAGNOSIS — I12.9 BENIGN HYPERTENSION WITH CHRONIC KIDNEY DISEASE, STAGE III: ICD-10-CM

## 2025-06-16 DIAGNOSIS — E78.49 OTHER HYPERLIPIDEMIA: ICD-10-CM

## 2025-06-16 DIAGNOSIS — N18.30 BENIGN HYPERTENSION WITH CHRONIC KIDNEY DISEASE, STAGE III: ICD-10-CM

## 2025-06-16 DIAGNOSIS — K21.9 GASTROESOPHAGEAL REFLUX DISEASE WITHOUT ESOPHAGITIS: ICD-10-CM

## 2025-06-16 RX ORDER — HYDROCHLOROTHIAZIDE 25 MG/1
25 TABLET ORAL DAILY
Qty: 90 TABLET | Refills: 1 | Status: SHIPPED | OUTPATIENT
Start: 2025-06-16

## 2025-06-16 RX ORDER — LISINOPRIL 40 MG/1
40 TABLET ORAL EVERY MORNING
Qty: 90 TABLET | Refills: 1 | Status: SHIPPED | OUTPATIENT
Start: 2025-06-16

## 2025-06-16 RX ORDER — FENOFIBRATE 145 MG/1
145 TABLET, FILM COATED ORAL DAILY
Qty: 90 TABLET | Refills: 1 | Status: SHIPPED | OUTPATIENT
Start: 2025-06-16

## 2025-06-16 RX ORDER — OMEPRAZOLE 40 MG/1
CAPSULE, DELAYED RELEASE ORAL
Qty: 90 CAPSULE | Refills: 1 | Status: SHIPPED | OUTPATIENT
Start: 2025-06-16

## 2025-06-16 RX ORDER — PRAVASTATIN SODIUM 10 MG/1
10 TABLET ORAL DAILY
Qty: 90 TABLET | Refills: 1 | Status: SHIPPED | OUTPATIENT
Start: 2025-06-16

## 2025-06-16 NOTE — TELEPHONE ENCOUNTER
No care due was identified.  [facility] Embedded Care Due Messages. Reference number: [id number].   6/16/2025 1:48:48 PM CDT

## 2025-06-16 NOTE — TELEPHONE ENCOUNTER
Spoke with patient. Provider out of clinic on 08/18/2025. Patient rescheduled fro 10/01/2025 with pcp. Patient requesting refill on all medication.

## 2025-06-16 NOTE — TELEPHONE ENCOUNTER
Copied from CRM #[id number]. Topic: Appointments - Appointment Rescheduling  >> Jun 16, 2025 10:11 AM Efe wrote:  Patient returning a call from Mark Cowart about her appointment that needs to be rescheduled because the doctor is not going to be in the office at the time of the visit. Please reach back out to the patient as soon as possible for scheduling.     Patient's Callback: .[phone]

## 2025-06-16 NOTE — TELEPHONE ENCOUNTER
Attempted to contact patient. Left a voice mail to call clinic back. Provider out 08/18/2025. Please reschedule.

## 2025-06-16 NOTE — TELEPHONE ENCOUNTER
Copied from CRM #[id number]. Topic: General Inquiry - Return Call  >> Jun 13, 2025 10:29 AM Mayte wrote:  Type:  Patient Returning Call    Who Called: self    Who Left Message for Patient: staff    Does the patient know what this is regarding?:no    Would the patient rather a call back or a response via [facility]? call    Best Call Back Number:..[phone]    Additional Information:   Spontaneous, unlabored and symmetrical

## 2025-07-07 ENCOUNTER — PATIENT OUTREACH (OUTPATIENT)
Dept: ADMINISTRATIVE | Facility: OTHER | Age: 80
End: 2025-07-07
Payer: MEDICARE

## 2025-07-07 NOTE — PROGRESS NOTES
CHW - Case Closure    This Community Health Worker spoke to patient via telephone today.   Pt/Caregiver reported: Resources received and patient has not reached out for assistance yet due to her health issues, she will whenever she feels better. patient has graduated and agreed to a case closure at this time.   Pt/Caregiver denied any additional needs at this time and agrees with episode closure at this time.  Provided patient with Community Health Worker's contact information and encouraged him/her to contact this Community Health Worker if additional needs arise.

## 2025-07-16 ENCOUNTER — CLINICAL SUPPORT (OUTPATIENT)
Dept: OPHTHALMOLOGY | Facility: CLINIC | Age: 80
End: 2025-07-16
Payer: MEDICARE

## 2025-07-16 ENCOUNTER — OFFICE VISIT (OUTPATIENT)
Dept: OPHTHALMOLOGY | Facility: CLINIC | Age: 80
End: 2025-07-16
Attending: OPHTHALMOLOGY
Payer: MEDICARE

## 2025-07-16 DIAGNOSIS — H35.3134 ADVANCED ATROPHIC NONEXUDATIVE AGE-RELATED MACULAR DEGENERATION OF BOTH EYES WITH SUBFOVEAL INVOLVEMENT: ICD-10-CM

## 2025-07-16 DIAGNOSIS — H35.3231 EXUDATIVE AGE-RELATED MACULAR DEGENERATION OF BOTH EYES WITH ACTIVE CHOROIDAL NEOVASCULARIZATION: Primary | ICD-10-CM

## 2025-07-16 PROCEDURE — 99999PBSHW PR PBB SHADOW TECHNICAL ONLY FILED TO HB: Mod: JZ,PBBFAC,,

## 2025-07-16 PROCEDURE — 67028 INJECTION EYE DRUG: CPT | Mod: PBBFAC,PO,LT | Performed by: OPHTHALMOLOGY

## 2025-07-16 PROCEDURE — 92134 CPTRZ OPH DX IMG PST SGM RTA: CPT | Mod: PBBFAC,PO | Performed by: OPHTHALMOLOGY

## 2025-07-16 PROCEDURE — 99213 OFFICE O/P EST LOW 20 MIN: CPT | Mod: PBBFAC,PO | Performed by: OPHTHALMOLOGY

## 2025-07-16 PROCEDURE — 99999 PR PBB SHADOW E&M-EST. PATIENT-LVL III: CPT | Mod: PBBFAC,,, | Performed by: OPHTHALMOLOGY

## 2025-07-16 RX ADMIN — PEGCETACOPLAN 15 MG: 15 INJECTION, SOLUTION INTRAVITREAL at 04:07

## 2025-07-16 NOTE — PROGRESS NOTES
Subjective:       Patient ID: Francoise De Jesus is a 79 y.o. female      Chief Complaint   Patient presents with    Injections     History of Present Illness  HPI    8 week oct/Sy os only     Pt states va is stable.   -Flashes   -Floaters   -Pain   -gtts   Last edited by Josey Choudhury on 7/16/2025  3:22 PM.        Imaging:    See report    Assessment/Plan:     1. Exudative age-related macular degeneration of both eyes with active choroidal neovascularization  Controlled OU today with q 12 wks wks s/p Av OU    Avas OU q 12 wks    Due in 7 wks    2. Advanced atrophic nonexudative age-related macular degeneration of both eyes with subfoveal involvement  Tolerating Syfovre well OS  Due today  Syf OS q 8 wks    Cont to follow OD with FAF.  Prev not convincingly changing on FAF and appears mostly wet AMD centrally    Follow up in about 7 weeks (around 9/3/2025), or if symptoms worsen or fail to improve, for Dilated examination (DILATE OU), Fundus Autofluoresence, OCT Mac, Injection both eyes, Avastin.

## 2025-09-03 ENCOUNTER — CLINICAL SUPPORT (OUTPATIENT)
Dept: OPHTHALMOLOGY | Facility: CLINIC | Age: 80
End: 2025-09-03
Payer: MEDICARE

## 2025-09-03 ENCOUNTER — OFFICE VISIT (OUTPATIENT)
Dept: OPHTHALMOLOGY | Facility: CLINIC | Age: 80
End: 2025-09-03
Attending: OPHTHALMOLOGY
Payer: MEDICARE

## 2025-09-03 DIAGNOSIS — H49.01: ICD-10-CM

## 2025-09-03 DIAGNOSIS — H35.3231 EXUDATIVE AGE-RELATED MACULAR DEGENERATION OF BOTH EYES WITH ACTIVE CHOROIDAL NEOVASCULARIZATION: Primary | ICD-10-CM

## 2025-09-03 DIAGNOSIS — H11.31 SUBCONJUNCTIVAL HEMORRHAGE, RIGHT: ICD-10-CM

## 2025-09-03 DIAGNOSIS — H35.3134 ADVANCED ATROPHIC NONEXUDATIVE AGE-RELATED MACULAR DEGENERATION OF BOTH EYES WITH SUBFOVEAL INVOLVEMENT: ICD-10-CM

## 2025-09-03 PROCEDURE — 99213 OFFICE O/P EST LOW 20 MIN: CPT | Mod: PBBFAC,PO | Performed by: OPHTHALMOLOGY

## 2025-09-03 PROCEDURE — 67028 INJECTION EYE DRUG: CPT | Mod: 50,PBBFAC,PO | Performed by: OPHTHALMOLOGY

## 2025-09-03 PROCEDURE — 92134 CPTRZ OPH DX IMG PST SGM RTA: CPT | Mod: PBBFAC,PO | Performed by: OPHTHALMOLOGY

## 2025-09-03 PROCEDURE — 99999 PR PBB SHADOW E&M-EST. PATIENT-LVL III: CPT | Mod: PBBFAC,,, | Performed by: OPHTHALMOLOGY

## (undated) DEVICE — SYR 3CC LUER LOC

## (undated) DEVICE — SOL IRR STRL WATER 500ML

## (undated) DEVICE — KNIFE ANGLE 1MM

## (undated) DEVICE — KIT EYE PIC PACK WB

## (undated) DEVICE — SEE MEDLINE ITEM 157117

## (undated) DEVICE — KIT CUSTOM BASIC EYE ST / MEA

## (undated) DEVICE — CARTRIDGE LENS D

## (undated) DEVICE — NDL 18GA X1 1/2 REG BEVEL

## (undated) DEVICE — SHEILD & GARTERS FOX METAL EYE

## (undated) DEVICE — SYR 10CC LUER LOCK

## (undated) DEVICE — NDL FILTER MICRON 18G 1 1/2

## (undated) DEVICE — GLOVE BIOGEL ECLIPSE SZ 7.5